# Patient Record
Sex: FEMALE | Race: WHITE | NOT HISPANIC OR LATINO | Employment: UNEMPLOYED | URBAN - METROPOLITAN AREA
[De-identification: names, ages, dates, MRNs, and addresses within clinical notes are randomized per-mention and may not be internally consistent; named-entity substitution may affect disease eponyms.]

---

## 2018-07-30 ENCOUNTER — OFFICE VISIT CONVERTED (OUTPATIENT)
Dept: CARDIOLOGY | Facility: CLINIC | Age: 53
End: 2018-07-30
Attending: INTERNAL MEDICINE

## 2018-10-23 ENCOUNTER — OFFICE VISIT CONVERTED (OUTPATIENT)
Dept: CARDIOLOGY | Facility: CLINIC | Age: 53
End: 2018-10-23
Attending: INTERNAL MEDICINE

## 2018-10-23 ENCOUNTER — CONVERSION ENCOUNTER (OUTPATIENT)
Dept: CARDIOLOGY | Facility: CLINIC | Age: 53
End: 2018-10-23

## 2019-01-07 ENCOUNTER — CONVERSION ENCOUNTER (OUTPATIENT)
Dept: CARDIOLOGY | Facility: CLINIC | Age: 54
End: 2019-01-07

## 2019-01-07 ENCOUNTER — OFFICE VISIT CONVERTED (OUTPATIENT)
Dept: CARDIOLOGY | Facility: CLINIC | Age: 54
End: 2019-01-07
Attending: INTERNAL MEDICINE

## 2019-05-07 ENCOUNTER — OFFICE VISIT CONVERTED (OUTPATIENT)
Dept: CARDIOLOGY | Facility: CLINIC | Age: 54
End: 2019-05-07
Attending: INTERNAL MEDICINE

## 2019-08-13 ENCOUNTER — CONVERSION ENCOUNTER (OUTPATIENT)
Dept: CARDIOLOGY | Facility: CLINIC | Age: 54
End: 2019-08-13

## 2019-08-13 ENCOUNTER — OFFICE VISIT CONVERTED (OUTPATIENT)
Dept: CARDIOLOGY | Facility: CLINIC | Age: 54
End: 2019-08-13
Attending: INTERNAL MEDICINE

## 2019-08-23 ENCOUNTER — HOSPITAL ENCOUNTER (OUTPATIENT)
Dept: OTHER | Facility: HOSPITAL | Age: 54
Discharge: HOME | End: 2019-08-23
Attending: INTERNAL MEDICINE

## 2019-08-23 LAB
ALBUMIN SERPL-MCNC: 4.2 G/DL (ref 3.5–5)
ALBUMIN/GLOB SERPL: 1.8 {RATIO} (ref 1.4–2.6)
ALP SERPL-CCNC: 112 U/L (ref 53–141)
ALT SERPL-CCNC: 9 U/L (ref 10–40)
ANION GAP SERPL CALC-SCNC: 15 MMOL/L (ref 8–19)
AST SERPL-CCNC: 13 U/L (ref 15–50)
BILIRUB SERPL-MCNC: 0.34 MG/DL (ref 0.2–1.3)
BUN SERPL-MCNC: 12 MG/DL (ref 5–25)
BUN/CREAT SERPL: 18 {RATIO} (ref 6–20)
CALCIUM SERPL-MCNC: 9 MG/DL (ref 8.7–10.4)
CHLORIDE SERPL-SCNC: 100 MMOL/L (ref 99–111)
CHOLEST SERPL-MCNC: 91 MG/DL (ref 107–200)
CHOLEST/HDLC SERPL: 2.5 {RATIO} (ref 3–6)
CONV CO2: 26 MMOL/L (ref 22–32)
CONV TOTAL PROTEIN: 6.5 G/DL (ref 6.3–8.2)
CREAT UR-MCNC: 0.67 MG/DL (ref 0.5–0.9)
GFR SERPLBLD BASED ON 1.73 SQ M-ARVRAT: >60 ML/MIN/{1.73_M2}
GLOBULIN UR ELPH-MCNC: 2.3 G/DL (ref 2–3.5)
GLUCOSE SERPL-MCNC: 86 MG/DL (ref 65–99)
HDLC SERPL-MCNC: 36 MG/DL (ref 40–60)
LDLC SERPL CALC-MCNC: 42 MG/DL (ref 70–100)
OSMOLALITY SERPL CALC.SUM OF ELEC: 283 MOSM/KG (ref 273–304)
POTASSIUM SERPL-SCNC: 3.6 MMOL/L (ref 3.5–5.3)
SODIUM SERPL-SCNC: 137 MMOL/L (ref 135–147)
TRIGL SERPL-MCNC: 66 MG/DL (ref 40–150)
VLDLC SERPL-MCNC: 13 MG/DL (ref 5–37)

## 2019-10-15 ENCOUNTER — OFFICE VISIT (OUTPATIENT)
Dept: FAMILY MEDICINE CLINIC | Facility: CLINIC | Age: 54
End: 2019-10-15

## 2019-10-15 VITALS
TEMPERATURE: 98.5 F | HEIGHT: 64 IN | HEART RATE: 108 BPM | BODY MASS INDEX: 26.46 KG/M2 | DIASTOLIC BLOOD PRESSURE: 82 MMHG | OXYGEN SATURATION: 97 % | WEIGHT: 155 LBS | SYSTOLIC BLOOD PRESSURE: 124 MMHG

## 2019-10-15 DIAGNOSIS — E03.9 HYPOTHYROIDISM, UNSPECIFIED TYPE: Primary | ICD-10-CM

## 2019-10-15 DIAGNOSIS — Z00.00 HEALTHCARE MAINTENANCE: ICD-10-CM

## 2019-10-15 PROCEDURE — 90686 IIV4 VACC NO PRSV 0.5 ML IM: CPT | Performed by: NURSE PRACTITIONER

## 2019-10-15 PROCEDURE — 99213 OFFICE O/P EST LOW 20 MIN: CPT | Performed by: NURSE PRACTITIONER

## 2019-10-15 PROCEDURE — 90471 IMMUNIZATION ADMIN: CPT | Performed by: NURSE PRACTITIONER

## 2019-10-15 RX ORDER — ATORVASTATIN CALCIUM 40 MG/1
TABLET, FILM COATED ORAL
Refills: 1 | COMMUNITY
Start: 2019-10-05 | End: 2021-11-08

## 2019-10-15 RX ORDER — CLONAZEPAM 0.5 MG/1
TABLET ORAL
Refills: 2 | COMMUNITY
Start: 2019-10-11 | End: 2022-02-17 | Stop reason: DRUGHIGH

## 2019-10-15 RX ORDER — DEXTROAMPHETAMINE SACCHARATE, AMPHETAMINE ASPARTATE, DEXTROAMPHETAMINE SULFATE AND AMPHETAMINE SULFATE 5; 5; 5; 5 MG/1; MG/1; MG/1; MG/1
1 TABLET ORAL DAILY
Refills: 0 | COMMUNITY
Start: 2022-01-22 | End: 2022-01-28

## 2019-10-15 RX ORDER — VENLAFAXINE HYDROCHLORIDE 150 MG/1
CAPSULE, EXTENDED RELEASE ORAL
Refills: 1 | COMMUNITY
Start: 2019-09-23 | End: 2021-01-12

## 2019-10-15 RX ORDER — TICAGRELOR 60 MG/1
60 TABLET ORAL 2 TIMES DAILY
Refills: 4 | COMMUNITY
Start: 2019-10-05 | End: 2021-07-22

## 2019-10-15 RX ORDER — CITALOPRAM 40 MG/1
20 TABLET ORAL NIGHTLY
Refills: 1 | COMMUNITY
Start: 2019-10-03 | End: 2022-01-18

## 2019-10-15 NOTE — PROGRESS NOTES
Subjective   Chely Peoples is a 54 y.o. female.     Chief Complaint   Patient presents with   • Hypothyroidism       History of Present Illness   Hypothyroidism: The patient reports doing poorly. Symptoms: depression. The patient is adherent to their medication regimen. The patient is not exercising. The patient does use tobacco. Due for: TSH.      The following portions of the patient's history were reviewed and updated as appropriate: allergies, current medications, past family history, past medical history, past social history, past surgical history and problem list.    Past Medical History:   Diagnosis Date   • Anxiety    • Anxiety and depression    • BMI 29.0-29.9,adult    • CAD (coronary artery disease)    • COPD (chronic obstructive pulmonary disease) (CMS/[facility])    • Depression    • Depression    • History of anemia    • History of suicide attempt    • History of viral gastroenteritis    • Hypothyroidism        Past Surgical History:   Procedure Laterality Date   • APPENDECTOMY     •  SECTION     • HYSTERECTOMY      Not due to cancer   • OOPHORECTOMY     • TONSILLECTOMY         Family History   Problem Relation Age of Onset   • COPD Mother    • Heart disease Other         FH in males before the age of 55       Social History     Socioeconomic History   • Marital status: Legally      Spouse name: Not on file   • Number of children: Not on file   • Years of education: Not on file   • Highest education level: Not on file   Tobacco Use   • Smoking status: Current Every Day Smoker     Packs/day: 1.00     Types: Cigarettes, Electronic Cigarette   • Smokeless tobacco: Never Used   • Tobacco comment: smokes less than 1 pack per day for 35 years   Substance and Sexual Activity   • Alcohol use: Yes     Comment: 7 or less drinks per week   • Drug use: No       Review of Systems   Psychiatric/Behavioral: Positive for depressed mood. Negative for suicidal ideas.       Objective   Vitals:    10/15/19  "1010   BP: 124/82   BP Location: Left arm   Patient Position: Sitting   Pulse: 108   Temp: 98.5 °F (36.9 °C)   SpO2: 97%   Weight: 70.3 kg (155 lb)   Height: 162.6 cm (64\")      Body mass index is 26.61 kg/m².  Physical Exam   Constitutional: She appears well-developed and well-nourished.   Cardiovascular: Normal rate, regular rhythm and normal heart sounds.   Pulmonary/Chest: Effort normal and breath sounds normal.   Psychiatric: She has a normal mood and affect.   Nursing note and vitals reviewed.        Assessment/Plan   Chely was seen today for hypothyroidism.    Diagnoses and all orders for this visit:    Hypothyroidism, unspecified type  -     TSH  -     Vitamin D 25 Hydroxy    Healthcare maintenance  -     Fluarix/Fluzone/Afluria Quad/FluLaval Quad               "

## 2019-10-16 ENCOUNTER — TELEPHONE (OUTPATIENT)
Dept: FAMILY MEDICINE CLINIC | Facility: CLINIC | Age: 54
End: 2019-10-16

## 2019-10-16 LAB
25(OH)D3+25(OH)D2 SERPL-MCNC: 20.5 NG/ML (ref 30–100)
TSH SERPL DL<=0.005 MIU/L-ACNC: 2.32 UIU/ML (ref 0.27–4.2)

## 2019-10-16 RX ORDER — ERGOCALCIFEROL 1.25 MG/1
50000 CAPSULE ORAL WEEKLY
Qty: 12 CAPSULE | Refills: 1 | Status: SHIPPED | OUTPATIENT
Start: 2019-10-16 | End: 2020-04-02 | Stop reason: SDUPTHER

## 2019-10-16 RX ORDER — LEVOTHYROXINE SODIUM 0.1 MG/1
100 TABLET ORAL DAILY
COMMUNITY
End: 2020-07-27 | Stop reason: SDUPTHER

## 2019-10-16 NOTE — TELEPHONE ENCOUNTER
Thyroid function test is normal.  Vitamin D is deficient at 20.5 so would recommend starting vitamin D2 50,000 units weekly and repeat vitamin D level in 6 months.

## 2020-01-14 ENCOUNTER — OFFICE VISIT (OUTPATIENT)
Dept: FAMILY MEDICINE CLINIC | Facility: CLINIC | Age: 55
End: 2020-01-14

## 2020-01-14 VITALS
HEART RATE: 104 BPM | SYSTOLIC BLOOD PRESSURE: 110 MMHG | HEIGHT: 64 IN | TEMPERATURE: 98.7 F | WEIGHT: 150 LBS | DIASTOLIC BLOOD PRESSURE: 62 MMHG | OXYGEN SATURATION: 98 % | BODY MASS INDEX: 25.61 KG/M2

## 2020-01-14 DIAGNOSIS — H93.13 TINNITUS OF BOTH EARS: Primary | ICD-10-CM

## 2020-01-14 PROBLEM — F90.9 ADHD: Status: ACTIVE | Noted: 2017-07-17

## 2020-01-14 PROBLEM — F32.A ANXIETY AND DEPRESSION: Status: ACTIVE | Noted: 2017-07-17

## 2020-01-14 PROBLEM — F41.9 ANXIETY AND DEPRESSION: Status: ACTIVE | Noted: 2017-07-17

## 2020-01-14 PROCEDURE — 99213 OFFICE O/P EST LOW 20 MIN: CPT | Performed by: NURSE PRACTITIONER

## 2020-01-14 NOTE — PROGRESS NOTES
Subjective   Chely Peoples is a 54 y.o. female.     Chief Complaint   Patient presents with   • Tinnitus       Tinnitus   This is a chronic problem. The problem has been unchanged. Pertinent negatives include no congestion or fever. Nothing aggravates the symptoms. Treatments tried: OTC tinnitus treatment  The treatment provided no relief.          The following portions of the patient's history were reviewed and updated as appropriate: allergies, current medications, past family history, past medical history, past social history, past surgical history and problem list.    Past Medical History:   Diagnosis Date   • Anxiety    • Anxiety and depression    • BMI 29.0-29.9,adult    • CAD (coronary artery disease)    • COPD (chronic obstructive pulmonary disease) (CMS/[facility])    • Depression    • Depression    • History of anemia    • History of suicide attempt    • History of viral gastroenteritis    • Hypothyroidism        Past Surgical History:   Procedure Laterality Date   • APPENDECTOMY     •  SECTION     • HYSTERECTOMY      Not due to cancer   • OOPHORECTOMY     • TONSILLECTOMY         Family History   Problem Relation Age of Onset   • COPD Mother    • Heart disease Other         FH in males before the age of 55       Social History     Socioeconomic History   • Marital status: Legally      Spouse name: Not on file   • Number of children: Not on file   • Years of education: Not on file   • Highest education level: Not on file   Tobacco Use   • Smoking status: Current Every Day Smoker     Packs/day: 1.00     Types: Cigarettes, Electronic Cigarette   • Smokeless tobacco: Never Used   • Tobacco comment: smokes less than 1 pack per day for 35 years   Substance and Sexual Activity   • Alcohol use: Yes     Comment: 7 or less drinks per week   • Drug use: No       Review of Systems   Constitutional: Negative for fever.   HENT: Positive for tinnitus. Negative for congestion and ear pain.        Objective  "  Vitals:    01/14/20 1714   BP: 110/62   BP Location: Left arm   Patient Position: Sitting   Pulse: 104   Temp: 98.7 °F (37.1 °C)   SpO2: 98%   Weight: 68 kg (150 lb)   Height: 162.6 cm (64.02\")      Body mass index is 25.73 kg/m².  Physical Exam   Constitutional: She is oriented to person, place, and time. She appears well-developed and well-nourished.   HENT:   Head: Normocephalic and atraumatic.   Right Ear: Tympanic membrane and ear canal normal.   Left Ear: Tympanic membrane and ear canal normal.   Nose: Nose normal.   Mouth/Throat: Oropharynx is clear and moist.   Cardiovascular: Normal rate, regular rhythm and normal heart sounds.   Pulmonary/Chest: Effort normal and breath sounds normal.   Neurological: She is alert and oriented to person, place, and time.   Skin: Skin is warm and dry.   Psychiatric: She has a normal mood and affect.   Nursing note and vitals reviewed.        Assessment/Plan   Chely was seen today for tinnitus.    Diagnoses and all orders for this visit:    Tinnitus of both ears  -     Ambulatory Referral to ENT (Otolaryngology)               "

## 2020-02-17 ENCOUNTER — CONVERSION ENCOUNTER (OUTPATIENT)
Dept: CARDIOLOGY | Facility: CLINIC | Age: 55
End: 2020-02-17

## 2020-02-17 ENCOUNTER — OFFICE VISIT CONVERTED (OUTPATIENT)
Dept: CARDIOLOGY | Facility: CLINIC | Age: 55
End: 2020-02-17
Attending: INTERNAL MEDICINE

## 2020-04-02 RX ORDER — ERGOCALCIFEROL 1.25 MG/1
50000 CAPSULE ORAL WEEKLY
Qty: 12 CAPSULE | Refills: 0 | Status: SHIPPED | OUTPATIENT
Start: 2020-04-02 | End: 2020-07-07 | Stop reason: SDUPTHER

## 2020-06-01 ENCOUNTER — HOSPITAL ENCOUNTER (OUTPATIENT)
Dept: OTHER | Facility: HOSPITAL | Age: 55
Discharge: HOME | End: 2020-06-01
Attending: INTERNAL MEDICINE

## 2020-06-01 LAB
ALBUMIN SERPL-MCNC: 4.1 G/DL (ref 3.5–5)
ALBUMIN/GLOB SERPL: 1.9 {RATIO} (ref 1.4–2.6)
ALP SERPL-CCNC: 107 U/L (ref 53–141)
ALT SERPL-CCNC: 8 U/L (ref 10–40)
ANION GAP SERPL CALC-SCNC: 13 MMOL/L (ref 8–19)
AST SERPL-CCNC: 13 U/L (ref 15–50)
BILIRUB SERPL-MCNC: 0.31 MG/DL (ref 0.2–1.3)
BUN SERPL-MCNC: 8 MG/DL (ref 5–25)
BUN/CREAT SERPL: 12 {RATIO} (ref 6–20)
CALCIUM SERPL-MCNC: 9.4 MG/DL (ref 8.7–10.4)
CHLORIDE SERPL-SCNC: 104 MMOL/L (ref 99–111)
CHOLEST SERPL-MCNC: 97 MG/DL (ref 107–200)
CHOLEST/HDLC SERPL: 2.2 {RATIO} (ref 3–6)
CONV CO2: 25 MMOL/L (ref 22–32)
CONV TOTAL PROTEIN: 6.3 G/DL (ref 6.3–8.2)
CREAT UR-MCNC: 0.68 MG/DL (ref 0.5–0.9)
GFR SERPLBLD BASED ON 1.73 SQ M-ARVRAT: >60 ML/MIN/{1.73_M2}
GLOBULIN UR ELPH-MCNC: 2.2 G/DL (ref 2–3.5)
GLUCOSE SERPL-MCNC: 96 MG/DL (ref 65–99)
HDLC SERPL-MCNC: 44 MG/DL (ref 40–60)
LDLC SERPL CALC-MCNC: 40 MG/DL (ref 70–100)
OSMOLALITY SERPL CALC.SUM OF ELEC: 284 MOSM/KG (ref 273–304)
POTASSIUM SERPL-SCNC: 3.6 MMOL/L (ref 3.5–5.3)
SODIUM SERPL-SCNC: 138 MMOL/L (ref 135–147)
TRIGL SERPL-MCNC: 63 MG/DL (ref 40–150)
VLDLC SERPL-MCNC: 13 MG/DL (ref 5–37)

## 2020-06-03 ENCOUNTER — OFFICE VISIT CONVERTED (OUTPATIENT)
Dept: CARDIOLOGY | Facility: CLINIC | Age: 55
End: 2020-06-03
Attending: INTERNAL MEDICINE

## 2020-06-03 ENCOUNTER — CONVERSION ENCOUNTER (OUTPATIENT)
Dept: OTHER | Facility: HOSPITAL | Age: 55
End: 2020-06-03

## 2020-07-08 RX ORDER — ERGOCALCIFEROL 1.25 MG/1
50000 CAPSULE ORAL WEEKLY
Qty: 12 CAPSULE | Refills: 0 | Status: SHIPPED | OUTPATIENT
Start: 2020-07-08 | End: 2021-07-22

## 2020-07-28 RX ORDER — LEVOTHYROXINE SODIUM 0.1 MG/1
100 TABLET ORAL DAILY
Qty: 90 TABLET | Refills: 0 | Status: SHIPPED | OUTPATIENT
Start: 2020-07-28 | End: 2020-08-07

## 2020-08-06 ENCOUNTER — OFFICE VISIT (OUTPATIENT)
Dept: FAMILY MEDICINE CLINIC | Facility: CLINIC | Age: 55
End: 2020-08-06

## 2020-08-06 VITALS
HEART RATE: 85 BPM | WEIGHT: 146.4 LBS | DIASTOLIC BLOOD PRESSURE: 64 MMHG | BODY MASS INDEX: 25.94 KG/M2 | SYSTOLIC BLOOD PRESSURE: 112 MMHG | OXYGEN SATURATION: 98 % | TEMPERATURE: 99.3 F | HEIGHT: 63 IN

## 2020-08-06 DIAGNOSIS — E55.9 VITAMIN D DEFICIENCY: ICD-10-CM

## 2020-08-06 DIAGNOSIS — E03.9 HYPOTHYROIDISM, UNSPECIFIED TYPE: ICD-10-CM

## 2020-08-06 DIAGNOSIS — Z00.00 WELL FEMALE EXAM WITHOUT GYNECOLOGICAL EXAM: Primary | ICD-10-CM

## 2020-08-06 PROBLEM — E78.5 HYPERLIPIDEMIA: Status: ACTIVE | Noted: 2020-08-06

## 2020-08-06 PROBLEM — I25.10 CAD (CORONARY ARTERY DISEASE): Status: ACTIVE | Noted: 2020-08-06

## 2020-08-06 LAB
BILIRUB BLD-MCNC: NEGATIVE MG/DL
CLARITY, POC: CLEAR
COLOR UR: YELLOW
GLUCOSE UR STRIP-MCNC: NEGATIVE MG/DL
KETONES UR QL: NEGATIVE
LEUKOCYTE EST, POC: NEGATIVE
NITRITE UR-MCNC: NEGATIVE MG/ML
PH UR: 6 [PH] (ref 5–8)
PROT UR STRIP-MCNC: NEGATIVE MG/DL
RBC # UR STRIP: NEGATIVE /UL
SP GR UR: 1.01 (ref 1–1.03)
UROBILINOGEN UR QL: NORMAL

## 2020-08-06 PROCEDURE — 99396 PREV VISIT EST AGE 40-64: CPT | Performed by: NURSE PRACTITIONER

## 2020-08-06 PROCEDURE — 90715 TDAP VACCINE 7 YRS/> IM: CPT | Performed by: NURSE PRACTITIONER

## 2020-08-06 PROCEDURE — 90471 IMMUNIZATION ADMIN: CPT | Performed by: NURSE PRACTITIONER

## 2020-08-06 PROCEDURE — 81003 URINALYSIS AUTO W/O SCOPE: CPT | Performed by: NURSE PRACTITIONER

## 2020-08-06 PROCEDURE — 90732 PPSV23 VACC 2 YRS+ SUBQ/IM: CPT | Performed by: NURSE PRACTITIONER

## 2020-08-06 PROCEDURE — 90472 IMMUNIZATION ADMIN EACH ADD: CPT | Performed by: NURSE PRACTITIONER

## 2020-08-06 NOTE — PROGRESS NOTES
Subjective   Chely Peoples is a 55 y.o. female.     Chief Complaint   Patient presents with   • Annual Exam       History of Present Illness   The patient is being seen for a health maintenance evaluation.  The last health maintenance visit is unknown.  Social history: Household members include none.  She is .  Work status: Not currently employed.  The patient is a current electronic cigarette smoker.  She reports rare alcohol use.  General health: The patient's health since the last visit is described as good.  She does not have regular dental visits.  The patient brushes 2 times a day, flosses daily and reports her last dental visit is unknown.  She denies vision problems.  Vision care includes reading glasses and no recent eye examination.  She denies hearing loss.  Immunization status: Tdap and pneumococcal vaccinations needed.  Lifestyle: She exercises regularly.  Reproductive health: She is not sexually active.  Screening: Last mammogram was about 3 years ago.  Last colonoscopy was performed at [facility] but date is unknown.      The following portions of the patient's history were reviewed and updated as appropriate: allergies, current medications, past family history, past medical history, past social history, past surgical history and problem list.    Past Medical History:   Diagnosis Date   • Anxiety    • Anxiety and depression    • BMI 29.0-29.9,adult    • CAD (coronary artery disease)    • COPD (chronic obstructive pulmonary disease) (CMS/[facility])    • Depression    • Depression    • History of anemia    • History of suicide attempt    • History of viral gastroenteritis    • Hypothyroidism        Past Surgical History:   Procedure Laterality Date   • APPENDECTOMY     •  SECTION     • HYSTERECTOMY      Not due to cancer   • OOPHORECTOMY     • TONSILLECTOMY         Family History   Problem Relation Age of Onset   • COPD Mother    • Heart disease Other         FH in males before the age of 55  "      Social History     Socioeconomic History   • Marital status: Legally      Spouse name: Not on file   • Number of children: Not on file   • Years of education: Not on file   • Highest education level: Not on file   Tobacco Use   • Smoking status: Current Every Day Smoker     Packs/day: 1.00     Types: Cigarettes, Electronic Cigarette   • Smokeless tobacco: Never Used   • Tobacco comment: smokes less than 1 pack per day for 35 years   Substance and Sexual Activity   • Alcohol use: Yes     Comment: 7 or less drinks per week   • Drug use: No       Review of Systems   Constitutional: Negative for fever.   HENT: Negative for ear pain, rhinorrhea and sore throat.    Eyes: Negative for visual disturbance.   Respiratory: Negative for cough and shortness of breath.    Cardiovascular: Negative for chest pain and leg swelling.   Gastrointestinal: Negative for abdominal pain, diarrhea, nausea and vomiting.   Genitourinary: Negative.    Musculoskeletal: Negative.    Skin: Negative for rash.   Neurological: Negative for dizziness and headache.   Psychiatric/Behavioral: Negative for depressed mood.       Objective   Vitals:    08/06/20 1011   BP: 112/64   Pulse: 85   Temp: 99.3 °F (37.4 °C)   TempSrc: Temporal   SpO2: 98%   Weight: 66.4 kg (146 lb 6.4 oz)   Height: 160 cm (63\")      Body mass index is 25.93 kg/m².  Physical Exam   Constitutional: She is oriented to person, place, and time. She appears well-developed and well-nourished.   HENT:   Head: Normocephalic and atraumatic.   Right Ear: Tympanic membrane and ear canal normal.   Left Ear: Tympanic membrane and ear canal normal.   Eyes: Pupils are equal, round, and reactive to light. Conjunctivae are normal.   Neck: Neck supple.   Cardiovascular: Normal rate, regular rhythm and normal heart sounds.   Pulmonary/Chest: Effort normal and breath sounds normal.   Abdominal: Soft. Bowel sounds are normal.   Genitourinary:   Genitourinary Comments: Deferred  "   Musculoskeletal: Normal range of motion.   Neurological: She is alert and oriented to person, place, and time.   Skin: Skin is warm and dry.   Psychiatric: She has a normal mood and affect.   Nursing note and vitals reviewed.        Assessment/Plan   Chely was seen today for annual exam.    Diagnoses and all orders for this visit:    Well female exam without gynecological exam  -     POCT urinalysis dipstick, automated  -     Mammo Screening Bilateral With CAD; Future  -     Tdap Vaccine Greater Than or Equal To 8yo IM  -     Pneumococcal Polysaccharide Vaccine 23-Valent Greater Than or Equal To 1yo Subcutaneous / IM  -     CBC & Differential    Hypothyroidism, unspecified type  -     TSH    Vitamin D deficiency  -     Vitamin D 25 Hydroxy    Impression: Currently, she has an adequate exercise regimen.  Mammogram was ordered today. Will obtain last colonoscopy record. Screening lab work includes hemoglobin, thyroid and vitamin D.  Tdap and pneumococcal vaccinations given today.  She was advised to be evaluated by dentist.  Advice and education were given regarding smoking cessation.

## 2020-08-07 ENCOUNTER — TELEPHONE (OUTPATIENT)
Dept: FAMILY MEDICINE CLINIC | Facility: CLINIC | Age: 55
End: 2020-08-07

## 2020-08-07 DIAGNOSIS — E03.9 HYPOTHYROIDISM, UNSPECIFIED TYPE: Primary | ICD-10-CM

## 2020-08-07 LAB
25(OH)D3+25(OH)D2 SERPL-MCNC: 55.1 NG/ML (ref 30–100)
BASOPHILS # BLD AUTO: 0 10*3/MM3 (ref 0–0.2)
BASOPHILS NFR BLD AUTO: 0 % (ref 0–1.5)
EOSINOPHIL # BLD AUTO: 0 10*3/MM3 (ref 0–0.4)
EOSINOPHIL NFR BLD AUTO: 0 % (ref 0.3–6.2)
ERYTHROCYTE [DISTWIDTH] IN BLOOD BY AUTOMATED COUNT: 12.3 % (ref 12.3–15.4)
HCT VFR BLD AUTO: 41.4 % (ref 34–46.6)
HGB BLD-MCNC: 13.9 G/DL (ref 12–15.9)
IMM GRANULOCYTES # BLD AUTO: 0.01 10*3/MM3 (ref 0–0.05)
IMM GRANULOCYTES NFR BLD AUTO: 0.2 % (ref 0–0.5)
LYMPHOCYTES # BLD AUTO: 1.34 10*3/MM3 (ref 0.7–3.1)
LYMPHOCYTES NFR BLD AUTO: 29.8 % (ref 19.6–45.3)
MCH RBC QN AUTO: 30.9 PG (ref 26.6–33)
MCHC RBC AUTO-ENTMCNC: 33.6 G/DL (ref 31.5–35.7)
MCV RBC AUTO: 92 FL (ref 79–97)
MONOCYTES # BLD AUTO: 0.25 10*3/MM3 (ref 0.1–0.9)
MONOCYTES NFR BLD AUTO: 5.6 % (ref 5–12)
NEUTROPHILS # BLD AUTO: 2.9 10*3/MM3 (ref 1.7–7)
NEUTROPHILS NFR BLD AUTO: 64.4 % (ref 42.7–76)
NRBC BLD AUTO-RTO: 0 /100 WBC (ref 0–0.2)
PLATELET # BLD AUTO: 217 10*3/MM3 (ref 140–450)
RBC # BLD AUTO: 4.5 10*6/MM3 (ref 3.77–5.28)
TSH SERPL DL<=0.005 MIU/L-ACNC: 5.55 UIU/ML (ref 0.27–4.2)
WBC # BLD AUTO: 4.5 10*3/MM3 (ref 3.4–10.8)

## 2020-08-07 RX ORDER — LEVOTHYROXINE SODIUM 100 MCG
100 TABLET ORAL DAILY
Qty: 90 TABLET | Refills: 0 | Status: SHIPPED | OUTPATIENT
Start: 2020-08-07 | End: 2021-01-13

## 2020-08-07 NOTE — TELEPHONE ENCOUNTER
CBC is within acceptable limits. TSH is elevated so will need to increase levothyroxine unless you have missed any doses. Vitamin D is optimal at 55.1 so will just need to switch to OTC vitamin D3 supplementation.    Patient has missed several doses of Synthroid so will not adjust at this time. Will repeat TSH in 8 weeks.

## 2020-10-02 ENCOUNTER — RESULTS ENCOUNTER (OUTPATIENT)
Dept: FAMILY MEDICINE CLINIC | Facility: CLINIC | Age: 55
End: 2020-10-02

## 2020-10-02 DIAGNOSIS — E03.9 HYPOTHYROIDISM, UNSPECIFIED TYPE: ICD-10-CM

## 2021-01-04 RX ORDER — LEVOTHYROXINE SODIUM 100 MCG
100 TABLET ORAL DAILY
Qty: 90 TABLET | Refills: 0 | Status: CANCELLED | OUTPATIENT
Start: 2021-01-04

## 2021-01-04 NOTE — TELEPHONE ENCOUNTER
Caller: Chely Peoples    Relationship: Self    Best call back number: [phone]    Medication needed:   Requested Prescriptions     Pending Prescriptions Disp Refills   • Synthroid 100 MCG tablet 90 tablet 0     Sig: Take 1 tablet by mouth Daily.       When do you need the refill by: ASAP    What details did the patient provide when requesting the medication: PATIENT IS COMPLETELY OUT OF MEDICATION. SHE SAID SHE DOESN'T HAVE ANY REFILLS LEFT AND MADE APPT WITH QUENTIN HORN FOR 1/12/20.     Does the patient have less than a 3 day supply:  [x] Yes  [] No    What is the patient's preferred pharmacy:   [facility] [address] [place] [address], [place] [address]  MARGARITA DOMINGUEZ  - [phone]  - [phone] FX

## 2021-01-12 ENCOUNTER — OFFICE VISIT (OUTPATIENT)
Dept: FAMILY MEDICINE CLINIC | Facility: CLINIC | Age: 56
End: 2021-01-12

## 2021-01-12 VITALS
OXYGEN SATURATION: 98 % | WEIGHT: 140.4 LBS | TEMPERATURE: 97.3 F | SYSTOLIC BLOOD PRESSURE: 110 MMHG | HEART RATE: 97 BPM | BODY MASS INDEX: 24.88 KG/M2 | DIASTOLIC BLOOD PRESSURE: 68 MMHG | HEIGHT: 63 IN

## 2021-01-12 DIAGNOSIS — E03.9 HYPOTHYROIDISM, UNSPECIFIED TYPE: Primary | ICD-10-CM

## 2021-01-12 PROBLEM — T14.91XA SUICIDE ATTEMPT: Status: ACTIVE | Noted: 2020-09-13

## 2021-01-12 PROBLEM — I95.9 HYPOTENSION: Status: ACTIVE | Noted: 2020-09-13

## 2021-01-12 PROCEDURE — 99212 OFFICE O/P EST SF 10 MIN: CPT | Performed by: NURSE PRACTITIONER

## 2021-01-12 RX ORDER — DULOXETIN HYDROCHLORIDE 30 MG/1
30 CAPSULE, DELAYED RELEASE ORAL DAILY
COMMUNITY
End: 2021-07-22

## 2021-01-12 NOTE — PROGRESS NOTES
"Chief Complaint  Hypothyroidism    Subjective          Chely Peoples presents to [facility] for   History of Present Illness  Hypothyroidism: The patient reports doing fairly well. Symptoms: depression. The patient is adherent to their medication regimen. The patient is not exercising. The patient does use tobacco. Due for: TSH.    Objective   Vital Signs:   /68   Pulse 97   Temp 97.3 °F (36.3 °C) (Temporal)   Ht 160 cm (63\")   Wt 63.7 kg (140 lb 6.4 oz)   SpO2 98%   BMI 24.87 kg/m²     Physical Exam  Vitals signs and nursing note reviewed.   Cardiovascular:      Rate and Rhythm: Normal rate and regular rhythm.   Pulmonary:      Effort: Pulmonary effort is normal.      Breath sounds: Normal breath sounds.   Neurological:      Mental Status: She is oriented to person, place, and time.   Psychiatric:         Mood and Affect: Mood normal.        Result Review :   The following data was reviewed by: LOLITA Kimbrough on 01/12/2021:  TSH    TSH 8/6/20   TSH 5.550 (A)   (A) Abnormal value                   Assessment and Plan    Problem List Items Addressed This Visit        Endocrine and Metabolic    Hypothyroidism - Primary    Relevant Orders    TSH        I spent 10 minutes caring for Chely on this date of service. This time includes time spent by me in the following activities:reviewing tests, performing a medically appropriate examination and/or evaluation , counseling and educating the patient/family/caregiver, ordering medications, tests, or procedures and documenting information in the medical record  Follow Up   No follow-ups on file.  Patient was given instructions and counseling regarding her condition or for health maintenance advice. Please see specific information pulled into the AVS if appropriate.       "

## 2021-01-13 ENCOUNTER — TELEPHONE (OUTPATIENT)
Dept: FAMILY MEDICINE CLINIC | Facility: CLINIC | Age: 56
End: 2021-01-13

## 2021-01-13 DIAGNOSIS — E03.9 HYPOTHYROIDISM, UNSPECIFIED TYPE: Primary | ICD-10-CM

## 2021-01-13 LAB — TSH SERPL DL<=0.005 MIU/L-ACNC: 13.4 UIU/ML (ref 0.27–4.2)

## 2021-01-13 RX ORDER — LEVOTHYROXINE SODIUM 125 MCG
125 TABLET ORAL DAILY
Qty: 90 TABLET | Refills: 0 | Status: SHIPPED | OUTPATIENT
Start: 2021-01-13 | End: 2021-07-06 | Stop reason: SDUPTHER

## 2021-01-13 NOTE — TELEPHONE ENCOUNTER
TSH has increased since last visit so will need to increase Synthroid to 125 mcg daily and repeat TSH in 8 weeks. Please make sure you are compliant with medication regimen.    Patient aware of results and recommendations.

## 2021-01-14 ENCOUNTER — TELEPHONE (OUTPATIENT)
Dept: FAMILY MEDICINE CLINIC | Facility: CLINIC | Age: 56
End: 2021-01-14

## 2021-01-14 NOTE — TELEPHONE ENCOUNTER
Caller: Chely Peoples    Relationship to patient: Self    Best call back number: [phone]    Patient is needing: PT CALLED WANTING TO KNOW WHAT HER BLOOD TYPE IS IF THE OFFICE WOULD HAVE THAT RECORD

## 2021-01-14 NOTE — TELEPHONE ENCOUNTER
Patient informed we do not type blood and advised her to call the hospital she had surgery at to see if they typed her blood for the surgery or she can donate blood and have it typed that way.

## 2021-01-19 ENCOUNTER — OFFICE VISIT CONVERTED (OUTPATIENT)
Dept: CARDIOLOGY | Facility: CLINIC | Age: 56
End: 2021-01-19
Attending: INTERNAL MEDICINE

## 2021-03-02 ENCOUNTER — TELEPHONE (OUTPATIENT)
Dept: FAMILY MEDICINE CLINIC | Facility: CLINIC | Age: 56
End: 2021-03-02

## 2021-03-02 NOTE — TELEPHONE ENCOUNTER
lmtcb    Okay for hub to read    Need to see if patient has had mammogram or the date and time that it is scheduled.

## 2021-04-29 ENCOUNTER — TELEPHONE (OUTPATIENT)
Dept: FAMILY MEDICINE CLINIC | Facility: CLINIC | Age: 56
End: 2021-04-29

## 2021-04-29 NOTE — TELEPHONE ENCOUNTER
** hub may ask patient and route response message back to provider**    Ask patient if she ever went to have her mammogram from 2020 and if she did where and what date she go.

## 2021-04-29 NOTE — TELEPHONE ENCOUNTER
PATIENT CALLED AND HUB ASKED AND SHE STATED SHE DID NOT HAVE A MAMMOGRAM DONE IN 2020 BUT WOULD LIKE TO GO AHEAD AND HAVE ORDERS SENT TO GET ONE DONE PLEASE.  CALLER:MANASA   CALLBACK # [phone]

## 2021-04-29 NOTE — TELEPHONE ENCOUNTER
There is a mammogram order dated 08/06/20 can patient still use that order ? She never went to have it done. This patient is on your metric sheet.

## 2021-05-07 ENCOUNTER — TELEPHONE (OUTPATIENT)
Dept: FAMILY MEDICINE CLINIC | Facility: CLINIC | Age: 56
End: 2021-05-07

## 2021-05-07 NOTE — TELEPHONE ENCOUNTER
Left message to return call to see if pt had her mammo done at [facility] yet, no record found.

## 2021-05-13 NOTE — PROGRESS NOTES
Progress Note      Patient Name: Chely Peoples   Patient ID: [id number]   Sex: Female   YOB: 1965    Primary Care Provider: Narcisa MCHUGH   Referring Provider: Narcisa MCHUGH    Visit Date: Kay 3, 2020    Provider: Jose Luis Owens MD   Location: [facility]   Location Address: [address] [place] [address] [facility]  [address] [place] [address]  [id number]   Location Phone: [phone]          Chief Complaint  · Follow-up visit for coronary artery disease       History Of Present Illness  REFERRING CARE PROVIDER: Narcisa MCHUGH   Chely Peoples is a 54-year-old female with premature coronary artery disease, previous myocardial infarction and stent placement, who is here for a follow-up visit. Currently she reports feeling fine. No chest pain episodes in the past 6 months. Occasionally she feels palpitations she describes as a fluttering sensation in the chest, which she feels is related to anxiety. There is no shortness of breath or dizziness. She is taking all the medicines as prescribed.   PAST MEDICAL HISTORY: (1) Coronary artery disease, index presentation being non-STEMI on 06/29/2018 followed by angioplasty and stent placement in the right coronary artery. She was also noted to have a 60% stenosis involving the mid left anterior descending artery with calcification. (2) Hyperlipidemia. (3) Hypothyroidism. (4) Negative for diabetes mellitus.   PSYCHOSOCIAL HISTORY: Positive for mood changes and depression. She never used alcohol. She previously used tobacco but quit.   CURRENT MEDICATIONS: include Atorvastatin 40 mg daily; Brilinta 60 mg b.i.d; aspirin 81 mg daily; Synthroid 0.1 mg daily; Citalopram 40 mg daily; Venlafaxine 150 mg daily; Clonazepam 0.5 mg daily; Vitamin D2; Adderall 20 mg b.i.d.; B12. The dosage and frequency of the medications were reviewed with the patient.       Review of Systems  · Cardiovascular  o Admits  o : palpitations (fast, fluttering, or  "skipping beats)  o Denies  o : swelling (feet, ankles, hands), shortness of breath while walking or lying flat, chest pain or angina pectoris   · Respiratory  o Denies  o : chronic or frequent cough, asthma or wheezing      Vitals  Date Time BP Position Site L\R Cuff Size HR RR TEMP (F) WT  HT  BMI kg/m2 BSA m2 O2 Sat HC       06/03/2020 02:01 /75 Sitting    81 - R   145lbs 8oz 5'  3\" 25.77 1.71           Physical Examination  · Respiratory  o Auscultation of Lungs  o : Clear to auscultation bilaterally. No crackles or rhonchi.  · Cardiovascular  o Heart  o : S1, S2 is normally heard. No S3. No murmur, rubs, or gallops.  · Gastrointestinal  o Abdominal Examination  o : Soft, nontender, nondistended. No free fluid. Bowel sounds heard in all four quadrants.  · Extremities  o Extremities  o : Warm and well perfused. No pitting pedal edema. Distal pulses present.     Labs done on 06/01/2020 show BUN of 8, creatinine 0.68, sodium 138, potassium 3.6.  Chloride 104, bicarb 25, total protein 6.3, albumin 4.1, globulin 2.2.  AST 13, ALT 8.  TRG 63, TC 97, HDL 44, LDL 40.  Bilirubin is 0.31.           Assessment     ASSESSMENT AND PLAN:    1.  Coronary artery disease:  Previous angioplasty, stable with no angina.  Preserved left ventricular function.       Continue aspirin, Brilinta, statin.  2.  Hyperlipidemia:  LDL at goal.  Continue Atorvastatin.  3.  Follow up in 6 months with repeat labs.    MD JORDAN Lance/erasmo           This note was transcribed by Janis Lake.  erasmo/JORDAN  The above service was transcribed by Janis Lake, and I attest to the accuracy of the note.  JORDAN               Electronically Signed by: Janis Lake-, -Author on Kay 10, 2020 09:22:26 AM  Electronically Co-signed by: Jose Luis Owens MD -Reviewer on June 18, 2020 03:24:09 PM  "

## 2021-05-14 VITALS
WEIGHT: 144 LBS | DIASTOLIC BLOOD PRESSURE: 70 MMHG | SYSTOLIC BLOOD PRESSURE: 116 MMHG | HEART RATE: 70 BPM | HEIGHT: 63 IN | BODY MASS INDEX: 25.52 KG/M2

## 2021-05-14 NOTE — PROGRESS NOTES
Progress Note      Patient Name: Chely Peoples   Patient ID: [id number]   Sex: Female   YOB: 1965    Primary Care Provider: Narcisa MCHUGH   Referring Provider: Narcisa MCHUGH    Visit Date: January 19, 2021    Provider: Jose Luis Owens MD   Location: [facility]   Location Address: [address] [place] [address] [facility]  [address]  [place], [place]  [id number]   Location Phone: [phone]          Chief Complaint  · Coronary artery disease       History Of Present Illness  REFERRING CARE PROVIDER: Narcisa MCHUGH   Chely ePoples is a 55-year-old female with premature coronary artery disease, previous myocardial infarction and stent placement who is here for a followup visit. She was last seen in the office on 06/03/2020. She had an admission to [facility] in September because of intentional drug overdose. Per discharge summary, she was noted to have prolonged QT interval during the stay but no medication changes were made. She was discharged to inpatient psychiatry. The patient is currently going through a depression spell and is being considered for low-dose ketamine infusion for treatment. Currently, she does not have any chest pain, tightness, or pressure on activities. She does get chest tightness when she is stressed out and nervous, associated with palpitations. Not very active at baseline. Denies having any shortness of breath. No dizziness or syncopal episodes.   PAST MEDICAL HISTORY: (1) Coronary artery disease, index presentation being non-STEMI on 06/29/2018 followed by angioplasty and stent placement in the right coronary artery. She was also noted to have a 60% stenosis involving the mid left anterior descending artery with calcification. (2) Hyperlipidemia. (3) Hypothyroidism. (4) Negative for diabetes mellitus.   PSYCHOSOCIAL HISTORY: Rare alcohol use. Currently uses E-cigarettes.   CURRENT MEDICATIONS: Brilinta 60 mg b.i.d.; Atorvastatin 40 mg daily;  "Venlafaxine 150 mg daily; Duloxetine 30 mg daily; Synthroid 125 mg daily; Amphetamine salts 20 mg b.i.d.; multivitamin; B12 Complex and B12 sublingual.      ALLERGIES: Tetracycline.       Review of Systems  · Cardiovascular  o Admits  o : palpitations (fast, fluttering, or skipping beats), chest pain or angina pectoris   o Denies  o : swelling (feet, ankles, hands), shortness of breath while walking or lying flat  · Respiratory  o Denies  o : chronic or frequent cough      Vitals  Date Time BP Position Site L\R Cuff Size HR RR TEMP (F) WT  HT  BMI kg/m2 BSA m2 O2 Sat FR L/min FiO2 HC       01/19/2021 11:31 /70 Sitting    70 - R   144lbs 0oz 5'  3\" 25.51 1.7             Physical Examination  · Respiratory  o Auscultation of Lungs  o : Clear to auscultation bilaterally. No crackles or rhonchi.  · Cardiovascular  o Heart  o : S1, S2 is normally heard. No S3. No murmur, rubs, or gallops.  · Gastrointestinal  o Abdominal Examination  o : Soft, nontender, nondistended. No free fluid. Bowel sounds heard in all four quadrants.  · Extremities  o Extremities  o : Warm and well perfused. No pitting pedal edema. Distal pulses present.  · Labs  o Labs  o : No recent labs available for review.          Assessment     ASSESSMENT AND PLAN:  1.  Coronary artery disease: Previous myocardial infarction and angioplasty. Currently stable with no angina-       like symptoms. Will continue aspirin, Brilinta, and statin at this time. Brilinta may be discontinued in June of        this year after completing three years.  2.  Hyperlipidemia:  Continue Atorvastatin. Previously well controlled lipids. We will check lipid panel before        next visit.  3.  The patient is going through a significant depression and is being currently considered for low-dose        ketamine infusion therapy. The side effects of  the medication include tachycardia and hypertension. Blood        and heart rate stable in our office today. From a cardiac " standpoint, it is okay to proceed with the planned        infusion.  4.  Followup with repeat labs.       Jose Luis Owens MD  JV/pap                Electronically Signed by: Marina Toney-, Other -Author on February 6, 2021 10:08:51 AM  Electronically Co-signed by: Jose Luis Owens MD -Reviewer on February 8, 2021 12:42:30 PM

## 2021-05-15 VITALS
SYSTOLIC BLOOD PRESSURE: 118 MMHG | DIASTOLIC BLOOD PRESSURE: 76 MMHG | BODY MASS INDEX: 27.82 KG/M2 | HEIGHT: 63 IN | HEART RATE: 96 BPM | WEIGHT: 157 LBS

## 2021-05-15 VITALS
DIASTOLIC BLOOD PRESSURE: 70 MMHG | SYSTOLIC BLOOD PRESSURE: 110 MMHG | BODY MASS INDEX: 28.17 KG/M2 | HEIGHT: 63 IN | WEIGHT: 159 LBS | HEART RATE: 100 BPM

## 2021-05-15 VITALS
HEART RATE: 81 BPM | HEIGHT: 63 IN | WEIGHT: 145.5 LBS | BODY MASS INDEX: 25.78 KG/M2 | SYSTOLIC BLOOD PRESSURE: 129 MMHG | DIASTOLIC BLOOD PRESSURE: 75 MMHG

## 2021-05-15 VITALS
SYSTOLIC BLOOD PRESSURE: 110 MMHG | HEART RATE: 95 BPM | HEIGHT: 63 IN | BODY MASS INDEX: 28.35 KG/M2 | DIASTOLIC BLOOD PRESSURE: 72 MMHG | WEIGHT: 160 LBS

## 2021-05-15 VITALS
BODY MASS INDEX: 26.4 KG/M2 | SYSTOLIC BLOOD PRESSURE: 126 MMHG | DIASTOLIC BLOOD PRESSURE: 62 MMHG | HEART RATE: 100 BPM | HEIGHT: 63 IN | WEIGHT: 149 LBS

## 2021-05-16 VITALS
DIASTOLIC BLOOD PRESSURE: 74 MMHG | HEART RATE: 106 BPM | BODY MASS INDEX: 29.06 KG/M2 | SYSTOLIC BLOOD PRESSURE: 120 MMHG | WEIGHT: 164 LBS | HEIGHT: 63 IN

## 2021-05-16 VITALS
SYSTOLIC BLOOD PRESSURE: 112 MMHG | DIASTOLIC BLOOD PRESSURE: 70 MMHG | BODY MASS INDEX: 28.7 KG/M2 | WEIGHT: 162 LBS | HEIGHT: 63 IN | HEART RATE: 88 BPM

## 2021-06-02 ENCOUNTER — OFFICE VISIT CONVERTED (OUTPATIENT)
Dept: CARDIOLOGY | Facility: CLINIC | Age: 56
End: 2021-06-02
Attending: INTERNAL MEDICINE

## 2021-06-05 NOTE — PROGRESS NOTES
Progress Note      Patient Name: Chely Peoples   Patient ID: [id number]   Sex: Female   YOB: 1965    Primary Care Provider: Narcisa MCHUGH   Referring Provider: Narcisa MCHUGH    Visit Date: June 2, 2021    Provider: Jose Luis Owens MD   Location: [facility]   Location Address: [address] [place], [place] [address]an [facility]  [address] [place] [address]  [id number]   Location Phone: [phone]          Chief Complaint     Follow-up visit for coronary artery disease and hyperlipidemia.         History Of Present Illness  REFERRING CARE PROVIDER: Narcisa MCHUGH   Chely Peoples is a 56 year old /White female with premature coronary artery disease, previous myocardial infarction angioplasty who is here for 6 month follow-up visit. Today, the patient reports feeling fine, denies having any episodes of chest pain, shortness of breath, palpitations or dizziness. Taking all the medicines as prescribed, however, she is not taking aspirin daily.   PAST MEDICAL HISTORY: (1) Coronary artery disease, index presentation being non-STEMI on 06/29/2018 followed by angioplasty and stent placement in the right coronary artery. She was also noted to have a 60% stenosis involving the mid left anterior descending artery with calcification. (2) Hyperlipidemia. (3) Hypothyroidism. (4) Negative for diabetes mellitus.   PSYCHOSOCIAL HISTORY: Previously smoked, but quit. Vapes.   CURRENT MEDICATIONS: Medications have been reviewed and are as documented.      ALLERGIES:  Tetracycline.       Review of Systems  · Cardiovascular  o Denies  o : palpitations (fast, fluttering, or skipping beats), swelling (feet, ankles, hands), shortness of breath while walking or lying flat, chest pain or angina pectoris   · Respiratory  o Denies  o : chronic or frequent cough      Vitals  Date Time BP Position Site L\R Cuff Size HR RR TEMP (F) WT  HT  BMI kg/m2 BSA m2 O2 Sat FR L/min FiO2 HC       06/02/2021 02:44 PM  "130/78 Sitting    92 - R   142lbs 0oz 5'  3\" 25.15 1.69             Physical Examination  · Respiratory  o Auscultation of Lungs  o : Clear to auscultation bilaterally. No crackles or rhonchi.  · Cardiovascular  o Heart  o : S1, S2 is normally heard. No S3. No murmur, rubs, or gallops.  · Gastrointestinal  o Abdominal Examination  o : Soft, nontender, nondistended. No free fluid. Bowel sounds heard in all four quadrants.  · Extremities  o Extremities  o : Warm and well perfused. No pitting pedal edema. Distal pulses present.  · Labs  o Labs  o : No recent labs available for review.          Assessment     1.  Coronary artery disease. Angioplasty in 2018, currently stable with no angina. left ventricular function is preserved. We will discontinue Brilinta now, however, the patient was encouraged and counseled strongly to take aspirin 81 mg on a daily basis. Continue statins as well.  2.  Hyperlipidemia, continue atorvastatin. We will check lipid panel now and adjust the dose if needed.  3.  We will follow the lab reports and otherwise follow-up in 9 months.        Jose Luis Owens MD  JV/vh               Electronically Signed by: Gretta Vidal-, OT -Author on Kay 3, 2021 12:51:45 PM  Electronically Co-signed by: Jose Luis Owens MD -Reviewer on Kay 3, 2021 02:12:43 PM  "

## 2021-07-02 ENCOUNTER — TELEPHONE (OUTPATIENT)
Dept: FAMILY MEDICINE CLINIC | Facility: CLINIC | Age: 56
End: 2021-07-02

## 2021-07-02 NOTE — TELEPHONE ENCOUNTER
PATIENT CALLBACK AND STATES THAT SHE HAD 05/24/2021 AT [facility]. PATIENT STATES THAT SHE HAS THE ID # THAT SHE PROVIDED [id number].  DR. MARGARITA ORR MD RADIOLOGIST        THANKS

## 2021-07-02 NOTE — TELEPHONE ENCOUNTER
LMTCB    **HUB** may ask if patient ever had her mammogram and if she did, where did she go, what date did she go? And route patients response back to provider.

## 2021-07-06 ENCOUNTER — TELEPHONE (OUTPATIENT)
Dept: FAMILY MEDICINE CLINIC | Facility: CLINIC | Age: 56
End: 2021-07-06

## 2021-07-06 RX ORDER — LEVOTHYROXINE SODIUM 125 MCG
125 TABLET ORAL DAILY
Qty: 90 TABLET | Refills: 0 | Status: SHIPPED | OUTPATIENT
Start: 2021-07-06 | End: 2022-01-12 | Stop reason: SDUPTHER

## 2021-07-07 DIAGNOSIS — Z00.00 WELL FEMALE EXAM WITHOUT GYNECOLOGICAL EXAM: ICD-10-CM

## 2021-07-15 VITALS
HEART RATE: 92 BPM | SYSTOLIC BLOOD PRESSURE: 130 MMHG | DIASTOLIC BLOOD PRESSURE: 78 MMHG | BODY MASS INDEX: 25.16 KG/M2 | WEIGHT: 142 LBS | HEIGHT: 63 IN

## 2021-07-22 ENCOUNTER — OFFICE VISIT (OUTPATIENT)
Dept: FAMILY MEDICINE CLINIC | Facility: CLINIC | Age: 56
End: 2021-07-22

## 2021-07-22 VITALS
DIASTOLIC BLOOD PRESSURE: 70 MMHG | TEMPERATURE: 98 F | BODY MASS INDEX: 26.05 KG/M2 | HEIGHT: 63 IN | WEIGHT: 147 LBS | SYSTOLIC BLOOD PRESSURE: 127 MMHG | OXYGEN SATURATION: 98 % | HEART RATE: 80 BPM

## 2021-07-22 DIAGNOSIS — E78.5 HYPERLIPIDEMIA, UNSPECIFIED HYPERLIPIDEMIA TYPE: ICD-10-CM

## 2021-07-22 DIAGNOSIS — E53.8 VITAMIN B12 DEFICIENCY: ICD-10-CM

## 2021-07-22 DIAGNOSIS — E03.9 HYPOTHYROIDISM, UNSPECIFIED TYPE: Primary | ICD-10-CM

## 2021-07-22 DIAGNOSIS — E55.9 VITAMIN D DEFICIENCY: ICD-10-CM

## 2021-07-22 PROCEDURE — 99213 OFFICE O/P EST LOW 20 MIN: CPT | Performed by: NURSE PRACTITIONER

## 2021-07-22 NOTE — PROGRESS NOTES
"Chief Complaint  Hypothyroidism    Subjective          Chely Peoples presents to [facility]  History of Present Illness  Hypothyroidism: The patient reports doing well. Symptoms: none. The patient is adherent to their medication regimen. The patient is not exercising. The patient does use electronic cigarette. Due for: TSH.    Hyperlipidemia: Symptoms: none. Medications:The patient is adherent with their medication regimen. Medication(s): statin. The patient is due for lipid panel and liver function tests .  Objective   Vital Signs:   /70   Pulse 80   Temp 98 °F (36.7 °C) (Temporal)   Ht 160 cm (63\")   Wt 66.7 kg (147 lb)   SpO2 98%   BMI 26.04 kg/m²     Physical Exam  Vitals and nursing note reviewed.   Constitutional:       Appearance: Normal appearance.   Cardiovascular:      Rate and Rhythm: Normal rate and regular rhythm.      Heart sounds: Normal heart sounds.   Pulmonary:      Effort: Pulmonary effort is normal.      Breath sounds: Normal breath sounds.   Neurological:      Mental Status: She is alert and oriented to person, place, and time.   Psychiatric:         Mood and Affect: Mood normal.        Result Review :   The following data was reviewed by: LOLITA Kimbrough on 07/22/2021:  TSH    TSH 8/6/20 1/12/21 3/22/21   TSH 5.550 (A) 13.400 (A) 1.960   (A) Abnormal value                   Assessment and Plan    Diagnoses and all orders for this visit:    1. Hypothyroidism, unspecified type (Primary)  -     TSH    2. Hyperlipidemia, unspecified hyperlipidemia type  -     Lipid Panel With / Chol / HDL Ratio  -     Comprehensive Metabolic Panel    3. Vitamin D deficiency  -     Vitamin D 25 Hydroxy    4. Vitamin B12 deficiency  -     Vitamin B12      I spent 20 minutes caring for Chely on this date of service. This time includes time spent by me in the following activities:reviewing tests, performing a medically appropriate examination and/or evaluation , " counseling and educating the patient/family/caregiver, ordering medications, tests, or procedures and documenting information in the medical record  Follow Up   No follow-ups on file.  Patient was given instructions and counseling regarding her condition or for health maintenance advice. Please see specific information pulled into the AVS if appropriate.

## 2021-07-23 LAB
25(OH)D3+25(OH)D2 SERPL-MCNC: 31.5 NG/ML (ref 30–100)
ALBUMIN SERPL-MCNC: 4.6 G/DL (ref 3.5–5.2)
ALBUMIN/GLOB SERPL: 2.3 G/DL
ALP SERPL-CCNC: 120 U/L (ref 39–117)
ALT SERPL-CCNC: 8 U/L (ref 1–33)
AST SERPL-CCNC: 15 U/L (ref 1–32)
BILIRUB SERPL-MCNC: 0.3 MG/DL (ref 0–1.2)
BUN SERPL-MCNC: 9 MG/DL (ref 6–20)
BUN/CREAT SERPL: 15.3 (ref 7–25)
CALCIUM SERPL-MCNC: 9.7 MG/DL (ref 8.6–10.5)
CHLORIDE SERPL-SCNC: 104 MMOL/L (ref 98–107)
CHOLEST SERPL-MCNC: 132 MG/DL (ref 0–200)
CHOLEST/HDLC SERPL: 2.93 {RATIO}
CO2 SERPL-SCNC: 28.2 MMOL/L (ref 22–29)
CREAT SERPL-MCNC: 0.59 MG/DL (ref 0.57–1)
GLOBULIN SER CALC-MCNC: 2 GM/DL
GLUCOSE SERPL-MCNC: 81 MG/DL (ref 65–99)
HDLC SERPL-MCNC: 45 MG/DL (ref 40–60)
LDLC SERPL CALC-MCNC: 71 MG/DL (ref 0–100)
POTASSIUM SERPL-SCNC: 4.2 MMOL/L (ref 3.5–5.2)
PROT SERPL-MCNC: 6.6 G/DL (ref 6–8.5)
SODIUM SERPL-SCNC: 142 MMOL/L (ref 136–145)
TRIGL SERPL-MCNC: 81 MG/DL (ref 0–150)
TSH SERPL DL<=0.005 MIU/L-ACNC: 1.17 UIU/ML (ref 0.27–4.2)
VIT B12 SERPL-MCNC: 823 PG/ML (ref 211–946)
VLDLC SERPL CALC-MCNC: 16 MG/DL (ref 5–40)

## 2021-11-08 RX ORDER — ATORVASTATIN CALCIUM 40 MG/1
TABLET, FILM COATED ORAL
Qty: 30 TABLET | Refills: 6 | Status: SHIPPED | OUTPATIENT
Start: 2021-11-08 | End: 2021-12-14 | Stop reason: SDUPTHER

## 2021-12-14 RX ORDER — ATORVASTATIN CALCIUM 40 MG/1
40 TABLET, FILM COATED ORAL
Qty: 90 TABLET | Refills: 0 | Status: SHIPPED | OUTPATIENT
Start: 2021-12-14 | End: 2021-12-20 | Stop reason: SDUPTHER

## 2021-12-20 RX ORDER — ATORVASTATIN CALCIUM 40 MG/1
40 TABLET, FILM COATED ORAL
Qty: 90 TABLET | Refills: 0 | Status: SHIPPED | OUTPATIENT
Start: 2021-12-20 | End: 2022-01-12 | Stop reason: SDUPTHER

## 2022-01-12 ENCOUNTER — OFFICE VISIT (OUTPATIENT)
Dept: FAMILY MEDICINE CLINIC | Age: 57
End: 2022-01-12

## 2022-01-12 VITALS
WEIGHT: 159.4 LBS | BODY MASS INDEX: 28.24 KG/M2 | SYSTOLIC BLOOD PRESSURE: 137 MMHG | HEIGHT: 63 IN | DIASTOLIC BLOOD PRESSURE: 86 MMHG | TEMPERATURE: 98.1 F | HEART RATE: 89 BPM

## 2022-01-12 DIAGNOSIS — Z23 ENCOUNTER FOR IMMUNIZATION: Primary | ICD-10-CM

## 2022-01-12 DIAGNOSIS — Z91.51 HISTORY OF SUICIDE ATTEMPT: ICD-10-CM

## 2022-01-12 DIAGNOSIS — F32.A ANXIETY AND DEPRESSION: ICD-10-CM

## 2022-01-12 DIAGNOSIS — E78.5 HYPERLIPIDEMIA, UNSPECIFIED HYPERLIPIDEMIA TYPE: ICD-10-CM

## 2022-01-12 DIAGNOSIS — F41.9 ANXIETY AND DEPRESSION: ICD-10-CM

## 2022-01-12 DIAGNOSIS — E03.9 HYPOTHYROIDISM, UNSPECIFIED TYPE: ICD-10-CM

## 2022-01-12 PROCEDURE — 91300 COVID-19 (PFIZER): CPT | Performed by: NURSE PRACTITIONER

## 2022-01-12 PROCEDURE — 99214 OFFICE O/P EST MOD 30 MIN: CPT | Performed by: NURSE PRACTITIONER

## 2022-01-12 PROCEDURE — 0003A COVID-19 (PFIZER): CPT | Performed by: NURSE PRACTITIONER

## 2022-01-12 PROCEDURE — 90686 IIV4 VACC NO PRSV 0.5 ML IM: CPT | Performed by: NURSE PRACTITIONER

## 2022-01-12 PROCEDURE — 90471 IMMUNIZATION ADMIN: CPT | Performed by: NURSE PRACTITIONER

## 2022-01-12 RX ORDER — ATORVASTATIN CALCIUM 40 MG/1
40 TABLET, FILM COATED ORAL
Qty: 90 TABLET | Refills: 0 | Status: SHIPPED | OUTPATIENT
Start: 2022-01-12 | End: 2022-03-09 | Stop reason: SDUPTHER

## 2022-01-12 RX ORDER — LEVOTHYROXINE SODIUM 125 MCG
125 TABLET ORAL DAILY
Qty: 90 TABLET | Refills: 0 | Status: SHIPPED | OUTPATIENT
Start: 2022-01-12 | End: 2022-04-15 | Stop reason: DRUGHIGH

## 2022-01-12 NOTE — PROGRESS NOTES
Chief Complaint  Chely Peoples presents to [facility] for Establish Care (pt has been out of depression medications for a few weeks) and Depression    Subjective          Establish care ;  Recently changed insurance   Beginning of year - old provider was unable to accept insurance     Has been off medication for about 2 weeks    Other than her citalopram/ adderall   Still has about 1 week of citalopram -  Adderall last filled Dec 17   Still has clonazepam 2 days worth      NIMCO is here for follow up on depression.  She reports that the current treatment is generally effective, but she is out of medication and needing to establish care with a mental health provider   NIMCO would like to discuss further recommendations.   She denies current suicidal and homicidal plan or intent.  She does have history of suicide attempts in the past  - as soon as last year.    Current symptoms include:depressed mood, feelings of worthlessness/guilt, hopelessness, insomnia and psychomotor retardation.     Previous treatment includes individual therapy and medication.   She complains of the following side effects from the treatment: none.         Review of Systems      Allergies   Allergen Reactions   • Valium [Diazepam]    • Alprazolam Other (See Comments)     Other reaction(s): Other (See Comments)  Makes hyper   • Tetracyclines & Related Rash      Past Medical History:   Diagnosis Date   • Anxiety    • Anxiety and depression    • BMI 29.0-29.9,adult    • CAD (coronary artery disease)    • COPD (chronic obstructive pulmonary disease) ([facility])    • Depression    • Depression    • History of anemia    • History of suicide attempt    • History of viral gastroenteritis    • Hypothyroidism    • Suicide attempt ([facility])      Current Outpatient Medications   Medication Sig Dispense Refill   • amphetamine-dextroamphetamine (ADDERALL) 20 MG tablet Take 1 tablet by mouth Daily. Taper medication to 1  "tablet, 20 mg by mouth daily in the am for 7 days, -`22.   0   • atorvastatin (LIPITOR) 40 MG tablet Take 1 tablet by mouth every night at bedtime. 90 tablet 0   • clonazePAM (KlonoPIN) 0.5 MG tablet TK 1 T PO QHS  2   • Synthroid 125 MCG tablet Take 1 tablet by mouth Daily. 90 tablet 0   • citalopram (CeleXA) 40 MG tablet Take 1 tablet by mouth Every Night for 60 days. Currently taking 2 of the 20 mg nightly  Indications: Depression, Generalized Anxiety Disorder 30 tablet 1     No current facility-administered medications for this visit.     Past Surgical History:   Procedure Laterality Date   • APPENDECTOMY     •  SECTION     • HYSTERECTOMY      Not due to cancer   • OOPHORECTOMY     • TONSILLECTOMY        Social History     Tobacco Use   • Smoking status: Former Smoker     Packs/day: 1.00     Types: Cigarettes, Electronic Cigarette     Quit date: 2021     Years since quittin.8   • Smokeless tobacco: Never Used   • Tobacco comment: smokes less than 1 pack per day for 35 years   Vaping Use   • Vaping Use: Former   Substance Use Topics   • Alcohol use: Not Currently   • Drug use: No     Family History   Problem Relation Age of Onset   • COPD Mother    • Heart disease Other         FH in males before the age of 55   • Suicide Attempts Nephew      Health Maintenance Due   Topic Date Due   • MAMMOGRAM  2014   • ZOSTER VACCINE (1 of 2) Never done   • ANNUAL PHYSICAL  2021      Immunization History   Administered Date(s) Administered   • COVID-19 (PFIZER) PURPLE CAP 2021, 2021, 2022   • FluLaval/Fluarix/Fluzone >6 10/15/2019, 2022   • Hepatitis A 2018   • Pneumococcal Polysaccharide (PPSV23) 2020   • Tdap 2020        Objective     Vitals:    22 1344   BP: 137/86   BP Location: Left arm   Patient Position: Sitting   Pulse: 89   Temp: 98.1 °F (36.7 °C)   TempSrc: Oral   Weight: 72.3 kg (159 lb 6.4 oz)   Height: 160 cm (62.99\")     Body " mass index is 28.24 kg/m².     Physical Exam  Constitutional:       General: She is not in acute distress.     Appearance: Normal appearance.   HENT:      Head: Normocephalic.   Cardiovascular:      Rate and Rhythm: Normal rate and regular rhythm.   Pulmonary:      Effort: Pulmonary effort is normal.      Breath sounds: Normal breath sounds.   Musculoskeletal:         General: Normal range of motion.   Neurological:      General: No focal deficit present.      Mental Status: She is alert and oriented to person, place, and time.   Psychiatric:         Mood and Affect: Mood is depressed. Affect is flat and tearful.         Behavior: Behavior is slowed.           Result Review :                               Assessment and Plan      Diagnoses and all orders for this visit:    1. Encounter for immunization (Primary)  -     FluLaval/Fluarix/Fluzone >6 Months ([id number])  -     COVID-19 Vaccine (Pfizer)    2. Anxiety and depression  Comments:  refills provided today - referral to psychiatry for ongoing management   Assessment & Plan:  Patient's depression is recurrent and is severe without psychosis. Their depression is currently active and the condition is worsening. This will be reassessed scheduling with Psychiatry. F/U as described:patient referred to Mental Health Specialist.  Medication refills provided today and appt for next week scheduled with Guera Arzate - to ER if suicidal or worsening     Orders:  -     Discontinue: Effexor  MG 24 hr capsule; Take 1 capsule by mouth Daily.  Dispense: 30 capsule; Refill: 1  -     Ambulatory Referral to Psychiatry    3. History of suicide attempt  Comments:  referral to psychiatry for ongoing management   Assessment & Plan:  History  in 2020  Hospitalized   - denies current suicidal ideations      4. Hyperlipidemia, unspecified hyperlipidemia type  Comments:  refills provided today  labs recommended when stable  Assessment & Plan:  Lipid abnormalities are  unchanged.  Pharmacotherapy as ordered.  Lipids will be reassessed in 6 months.    Orders:  -     atorvastatin (LIPITOR) 40 MG tablet; Take 1 tablet by mouth every night at bedtime.  Dispense: 90 tablet; Refill: 0    5. Hypothyroidism, unspecified type  Comments:  refills  labs recommended when stable   Assessment & Plan:  Has been off medications for 2 weeks, has been stable for the past year   Needs refills    Orders:  -     Synthroid 125 MCG tablet; Take 1 tablet by mouth Daily.  Dispense: 90 tablet; Refill: 0            Follow Up     No follow-ups on file.

## 2022-01-12 NOTE — ASSESSMENT & PLAN NOTE
Psychological condition is worsening.  Continue current treatment regimen.  Referral to psychiatry.  Psychological condition  will be reassessed at the next regular appointment.

## 2022-01-12 NOTE — ASSESSMENT & PLAN NOTE
Patient's depression is recurrent and is severe without psychosis. Their depression is currently active and the condition is worsening. This will be reassessed scheduling with Psychiatry. F/U as described:patient referred to Mental Health Specialist.  Medication refills provided today and appt for next week scheduled with Guera Arzate - to ER if suicidal or worsening

## 2022-01-13 ENCOUNTER — PATIENT ROUNDING (BHMG ONLY) (OUTPATIENT)
Dept: FAMILY MEDICINE CLINIC | Age: 57
End: 2022-01-13

## 2022-01-13 NOTE — PROGRESS NOTES
January 13, 2022    Hello, may I speak with Chely Peoples?    My name is AJ     I am  with [facility]  [address] [facility] [address] [facility] [address]  [facility] [address] [phone].    Before we get started may I verify your date of birth? 1965    I am calling to officially welcome you to our practice and ask about your recent visit. Is this a good time to talk? YES    Tell me about your visit with us. What things went well? Everything was good. Nusrat Malcolm was nice and professional.       We're always looking for ways to make our patients' experiences even better. Do you have recommendations on ways we may improve?  NO    Overall were you satisfied with your first visit to our practice? YES       I appreciate you taking the time to speak with me today. Is there anything else I can do for you? NO      Thank you, and have a great day.

## 2022-01-18 ENCOUNTER — CLINICAL SUPPORT (OUTPATIENT)
Dept: FAMILY MEDICINE CLINIC | Age: 57
End: 2022-01-18

## 2022-01-18 ENCOUNTER — OFFICE VISIT (OUTPATIENT)
Dept: BEHAVIORAL HEALTH | Facility: CLINIC | Age: 57
End: 2022-01-18

## 2022-01-18 VITALS
BODY MASS INDEX: 28.49 KG/M2 | DIASTOLIC BLOOD PRESSURE: 68 MMHG | HEIGHT: 63 IN | WEIGHT: 160.8 LBS | SYSTOLIC BLOOD PRESSURE: 120 MMHG

## 2022-01-18 DIAGNOSIS — F33.1 MAJOR DEPRESSIVE DISORDER, RECURRENT EPISODE, MODERATE: Primary | ICD-10-CM

## 2022-01-18 DIAGNOSIS — F41.1 GENERALIZED ANXIETY DISORDER: ICD-10-CM

## 2022-01-18 DIAGNOSIS — Z79.899 HIGH RISK MEDICATION USE: ICD-10-CM

## 2022-01-18 DIAGNOSIS — F41.9 ANXIETY AND DEPRESSION: Primary | ICD-10-CM

## 2022-01-18 DIAGNOSIS — Z87.898 HISTORY OF PROLONGED Q-T INTERVAL ON ECG: ICD-10-CM

## 2022-01-18 DIAGNOSIS — F32.A ANXIETY AND DEPRESSION: Primary | ICD-10-CM

## 2022-01-18 LAB
AMPHET+METHAMPHET UR QL: POSITIVE
AMPHETAMINES UR QL: NEGATIVE
BARBITURATES UR QL SCN: NEGATIVE
BENZODIAZ UR QL SCN: NEGATIVE
BUPRENORPHINE SERPL-MCNC: NEGATIVE NG/ML
CANNABINOIDS SERPL QL: NEGATIVE
COCAINE UR QL: NEGATIVE
EXPIRATION DATE: ABNORMAL
Lab: ABNORMAL
MDMA UR QL SCN: NEGATIVE
METHADONE UR QL SCN: NEGATIVE
OPIATES UR QL: NEGATIVE
OXYCODONE UR QL SCN: NEGATIVE
PCP UR QL SCN: NEGATIVE

## 2022-01-18 PROCEDURE — 80305 DRUG TEST PRSMV DIR OPT OBS: CPT | Performed by: NURSE PRACTITIONER

## 2022-01-18 PROCEDURE — 90792 PSYCH DIAG EVAL W/MED SRVCS: CPT | Performed by: NURSE PRACTITIONER

## 2022-01-18 RX ORDER — CITALOPRAM 40 MG/1
40 TABLET ORAL NIGHTLY
Qty: 30 TABLET | Refills: 1 | Status: SHIPPED | OUTPATIENT
Start: 2022-01-18 | End: 2022-02-17 | Stop reason: SDUPTHER

## 2022-01-18 NOTE — PROGRESS NOTES
Currently prescribed Adderall, will await for Klonopin metabolite to result for benzo confirmation.

## 2022-01-18 NOTE — PATIENT INSTRUCTIONS
1.  Please return to clinic at your next scheduled visit.  Contact the clinic ([phone]) at least 24 hours prior in the event you need to cancel.  2.  Do no harm to yourself or others.    3.  Avoid alcohol and drugs.    4.  Take all medications as prescribed.  Please contact the clinic with any concerns. If you are in need of medication refills, please call the clinic at [phone].    5. Should you want to get in touch with your provider, LOLITA Huynh, please utilize [employer] or contact the office ([phone]), and staff will be able to page the provider on call directly.  6.  In the event you have personal crisis, contact the following crisis numbers: Suicide Prevention Hotline 1-272.517.5625; [facility] Helpline 5-217-560-QABX; [facility] Emergency Room [phone]; text HELLO to 254526; or 147.     SPECIFIC RECOMMENDATIONS:     1.      Medications discussed at this encounter:                   - Stop taking the Effexor XR  Continue taking Celexa 40 mg total nightly (you have 20 mg in supply so continue taking 2 of the 20 mg to equal 40 mg) I have ordered the 40 mg tablet for you once the current bottle runs out.  Continue Adderall 20 mg by mouth twice daily, will need to discuss at next visit how to taper down dose.  Continue Klonopin 0.5 mg by mouth nightly.     2.      Psychotherapy recommendations: n/a    3.   LABS- EKG and urine drug screen      3.     Return to clinic: 2 weeks
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
## 2022-01-19 DIAGNOSIS — Z79.899 HIGH RISK MEDICATION USE: ICD-10-CM

## 2022-01-19 DIAGNOSIS — Z87.898 HISTORY OF PROLONGED Q-T INTERVAL ON ECG: ICD-10-CM

## 2022-01-19 PROCEDURE — 93000 ELECTROCARDIOGRAM COMPLETE: CPT | Performed by: NURSE PRACTITIONER

## 2022-01-20 PROCEDURE — G0480 DRUG TEST DEF 1-7 CLASSES: HCPCS | Performed by: NURSE PRACTITIONER

## 2022-01-21 ENCOUNTER — TELEPHONE (OUTPATIENT)
Dept: PSYCHIATRY | Facility: CLINIC | Age: 57
End: 2022-01-21

## 2022-01-21 NOTE — TELEPHONE ENCOUNTER
Patient called to discuss taper of Adderall from 20 mg twice daily to daily for 7 days.  Patient agreeable to plan, patient reported writing down instructions to only take one pill daily starting today, as patient took a dose this morning, and the last dose will be on Thurs. 1/27/22.  Patient to contact provider for any problems or any new/worsening symptoms arise.  Next appointment 2/3/22.

## 2022-01-24 NOTE — PROGRESS NOTES
Just the clonazepam urine metabolite I ordered to determine how much of the clonazepam is in her system due to the negative benzo on this screen.

## 2022-01-25 NOTE — PROGRESS NOTES
The urine was pulled out on Friday by Yun Llanes and the other lady was making sure it was sent as she was in the chart. Please let me know what happened to this as I will need to ask her to come back in  thank you

## 2022-01-29 LAB — 7AMINOCLONAZEPAM UR-MCNC: 87 NG/ML

## 2022-02-03 ENCOUNTER — TELEMEDICINE (OUTPATIENT)
Dept: BEHAVIORAL HEALTH | Facility: CLINIC | Age: 57
End: 2022-02-03

## 2022-02-03 DIAGNOSIS — F33.1 MAJOR DEPRESSIVE DISORDER, RECURRENT EPISODE, MODERATE: Primary | ICD-10-CM

## 2022-02-03 DIAGNOSIS — F41.1 GENERALIZED ANXIETY DISORDER: ICD-10-CM

## 2022-02-03 PROCEDURE — 99214 OFFICE O/P EST MOD 30 MIN: CPT | Performed by: NURSE PRACTITIONER

## 2022-02-03 NOTE — PATIENT INSTRUCTIONS
1.  Please return to clinic at your next scheduled visit.  Contact the clinic ([phone]) at least 24 hours prior in the event you need to cancel.  2.  Do no harm to yourself or others.    3.  Avoid alcohol and drugs.    4.  Take all medications as prescribed.  Please contact the clinic with any concerns. If you are in need of medication refills, please call the clinic at [phone].    5. Should you want to get in touch with your provider, LOLITA Huynh, please utilize [employer] or contact the office ([phone]), and staff will be able to page the provider on call directly.  6.  In the event you have personal crisis, contact the following crisis numbers: Suicide Prevention Hotline 1-781.915.4762; [facility] Helpline 4-771-748-REWU; [facility] Emergency Room [phone]; text HELLO to 471496; or 170.     SPECIFIC RECOMMENDATIONS:     1.      Medications discussed at this encounter:                   - Continue Celexa 40 mg by mouth nightly  Continue Clonazepam (Klonopin) 0.5 mg by mouth nightly (Call office to request refill, last filled 1/13/22, able to place order 2/11/22. You can call next Thurs or Friday to request.)     2.      Psychotherapy recommendations: n/a     3.     Return to clinic: 2 weeks
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
## 2022-02-11 RX ORDER — CLONAZEPAM 0.5 MG/1
TABLET ORAL
Refills: 2 | Status: CANCELLED | OUTPATIENT
Start: 2022-02-11

## 2022-02-11 NOTE — TELEPHONE ENCOUNTER
Caller: Chely Peoples    Relationship: Self    Best call back number: [phone]     Requested Prescriptions:   Requested Prescriptions     Pending Prescriptions Disp Refills   • clonazePAM (KlonoPIN) 0.5 MG tablet  2        Pharmacy where request should be sent: [id number] - [place], [place] - [address] [place] [facility] [address] [facility] - [phone]  - [phone] FX     Additional details provided by patient: 4 DAYS LEFT     Does the patient have less than a 3 day supply:  [] Yes  [x] No    Fanta Padilla Rep   02/11/22 12:51 EST

## 2022-02-17 ENCOUNTER — TELEMEDICINE (OUTPATIENT)
Dept: BEHAVIORAL HEALTH | Facility: CLINIC | Age: 57
End: 2022-02-17

## 2022-02-17 DIAGNOSIS — F33.1 MAJOR DEPRESSIVE DISORDER, RECURRENT EPISODE, MODERATE: ICD-10-CM

## 2022-02-17 DIAGNOSIS — F33.9 RECURRENT MAJOR DEPRESSION RESISTANT TO TREATMENT: ICD-10-CM

## 2022-02-17 DIAGNOSIS — F41.1 GENERALIZED ANXIETY DISORDER: ICD-10-CM

## 2022-02-17 PROCEDURE — 99214 OFFICE O/P EST MOD 30 MIN: CPT | Performed by: NURSE PRACTITIONER

## 2022-02-17 RX ORDER — CITALOPRAM 40 MG/1
40 TABLET ORAL NIGHTLY
Qty: 30 TABLET | Refills: 3 | Status: SHIPPED | OUTPATIENT
Start: 2022-02-17 | End: 2022-07-06

## 2022-02-17 RX ORDER — CLONAZEPAM 0.5 MG/1
TABLET ORAL
Qty: 60 TABLET | Refills: 0 | Status: SHIPPED | OUTPATIENT
Start: 2022-02-17 | End: 2022-03-24 | Stop reason: DRUGHIGH

## 2022-02-17 NOTE — PATIENT INSTRUCTIONS
1.  Please return to clinic at your next scheduled visit.  Contact the clinic ([phone]) at least 24 hours prior in the event you need to cancel.  2.  Do no harm to yourself or others.    3.  Avoid alcohol and drugs.    4.  Take all medications as prescribed.  Please contact the clinic with any concerns. If you are in need of medication refills, please call the clinic at [phone].    5. Should you want to get in touch with your provider, LOLITA Huynh, please utilize [employer] or contact the office ([phone]), and staff will be able to page the provider on call directly.  6.  In the event you have personal crisis, contact the following crisis numbers: Suicide Prevention Hotline 1-357.346.9520; [facility] Helpline 4-814-348-VOFI; [facility] Room [phone]; text HELLO to 042032; or 007.     SPECIFIC RECOMMENDATIONS:     1.      Medications discussed at this encounter:                   - Continue Celexa 40 mg by mouth nightly  -Continue Klonopin 0.5 mg by mouth nightly and added dose of 0.5 mg by mouth daily as needed anxiety.   -Start Rexulti (Brexiprazole) 0.5 mg by mouth daily in the morning.     2.      Psychotherapy recommendations: n/a     3.     Return to clinic: 2 weeks     Brexpiprazole Oral Tablets  What is this medicine?  BREXPIPRAZOLE (brex PIP ray zole) is an antipsychotic. It is used to treat schizophrenia. This medicine is also used in combination with antidepressants to treat major depressive disorder.  This medicine may be used for other purposes; ask your health care provider or pharmacist if you have questions.  COMMON BRAND NAME(S): REXULTI  What should I tell my health care provider before I take this medicine?  They need to know if you have any of these conditions:  · dementia  · diabetes  · difficulty swallowing  · have trouble controlling your muscles  · have urges you are unable to control (for example, gambling, spending money, or eating)  · heart  disease  · high cholesterol  · history of breast cancer  · history of stroke  · kidney disease  · liver disease  · low blood counts, like low white cell, platelet, or red cell counts  · low blood pressure  · Parkinson's disease  · seizures  · suicidal thoughts, plans or attempt; a previous suicide attempt by you or a family member  · an unusual or allergic reaction to brexpiprazole, other medicines, foods, dyes, or preservatives  · pregnant or trying to get pregnant  · breast-feeding  How should I use this medicine?  Take this medicine by mouth with a glass of water. Follow the directions on the prescription label. You can take this medicine with or without food. Take your doses at regular intervals. Do not take your medicine more often than directed. Do not stop taking except on the advice of your doctor or health care professional.  A special MedGuide will be given to you by the pharmacist with each prescription and refill. Be sure to read this information carefully each time.  Talk to your pediatrician regarding the use of this medicine in children. Special care may be needed.  Overdosage: If you think you have taken too much of this medicine contact a poison control center or emergency room at once.  NOTE: This medicine is only for you. Do not share this medicine with others.  What if I miss a dose?  If you miss a dose, take it as soon as you can. If it is almost time for your next dose, take only that dose. Do not take double or extra doses.  What may interact with this medicine?  Do not take this medicine with any of the following medications:  · aripiprazole  · metoclopramide  This medicine may also interact with the following medications:  · antihistamines for allergy, cough, and cold  · certain medicines for anxiety or sleep  · certain medicines for depression like amitriptyline, duloxetine, fluoxetine, paroxetine, sertraline  · certain medicines for fungal infections like fluconazole, itraconazole,  ketoconazole  · clarithromycin  · general anesthetics like halothane, isoflurane, methoxyflurane, propofol  · levodopa or other medicines for Parkinson's disease  · medicines for blood pressure  · medicines that relax muscles for surgery  · medicines for seizures  · narcotic medicines for pain  · phenothiazines like chlorpromazine, prochlorperazine, thioridazine  · quinidine  · rifampin  · Isidro's Wort  This list may not describe all possible interactions. Give your health care provider a list of all the medicines, herbs, non-prescription drugs, or dietary supplements you use. Also tell them if you smoke, drink alcohol, or use illegal drugs. Some items may interact with your medicine.  What should I watch for while using this medicine?  Visit your health care professional for regular checks on your progress. Tell your health care professional if symptoms do not start to get better or if they get worse. Do not stop taking except on your health care professional's advice. You may develop a severe reaction. Your health care professional will tell you how much medicine to take.  Patients and their families should watch out for new or worsening depression or thoughts of suicide. Also watch out for sudden changes in feelings such as feeling anxious, agitated, panicky, irritable, hostile, aggressive, impulsive, severely restless, overly excited and hyperactive, or not being able to sleep. If this happens, especially at the beginning of antidepressant treatment or after a change in dose, call your health care professional.  You may get dizzy or drowsy. Do not drive, use machinery, or do anything that needs mental alertness until you know how this medicine affects you. Do not stand or sit up quickly, especially if you are an older patient. This reduces the risk of dizzy or fainting spells. Alcohol may interfere with the effect of this medicine. Avoid alcoholic drinks.  There are have been reports of increased sexual urges  or other strong urges such as gambling while taking this medicine. If you experience any of these while taking this medicine, you should report this to your health care professional as soon as possible.  This medicine may cause dry eyes and blurred vision. If you wear contact lenses you may feel some discomfort. Lubricating drops may help. See your eye doctor if the problem does not go away or is severe.  This medicine may increase blood sugar. Ask your health care provider if changes in diet or medicines are needed if you have diabetes.  This drug can cause problems with controlling your body temperature. It can lower the response of your body to cold temperatures. If possible, stay indoors during cold weather. If you must go outdoors, wear warm clothes. It can also lower the response of your body to heat. Do not overheat. Do not over-exercise. Stay out of the sun when possible. If you must be in the sun, wear cool clothing. Drink plenty of water. If you have trouble controlling your body temperature, call your health care provider right away.  What side effects may I notice from receiving this medicine?  Side effects that you should report to your doctor or health care professional as soon as possible:  · allergic reactions like skin rash, itching or hives, swelling of the face, lips, or tongue  · breathing problems  · confusion  · elevated mood, decreased need for sleep, racing thoughts, impulsive behavior  · fever or chills, sore throat  · inability to keep still  · new or increased gambling urges, sexual urges, uncontrolled spending, binge or compulsive eating, or other urges  · problems with balance, talking, walking  · seizures  · signs and symptoms of high blood sugar such as being more thirsty or hungry or having to urinate more than normal. You may also feel very tired or have blurry vision  · signs and symptoms of low blood pressure like dizziness; feeling faint or lightheaded, falls; unusually weak or  tired  · signs and symptoms of neuroleptic malignant syndrome like confusion; fast or irregular heartbeat; high fever; increased sweating; stiff muscles  · sudden numbness or weakness of the face, arm, or leg  · suicidal thoughts or other mood changes  · trouble swallowing  · uncontrollable movements of the arms, face, head, mouth, neck, or upper body  Side effects that usually do not require medical attention (report to your doctor or health care professional if they continue or are bothersome):  · constipation  · drowsiness  · headache  · stomach upset  · weight gain  This list may not describe all possible side effects. Call your doctor for medical advice about side effects. You may report side effects to FDA at 2-568-JOI-2998.  Where should I keep my medicine?  Keep out of the reach of children.  Store at room temperature between 15 and 30 degrees C (59 and 86 degrees F). Throw away any unused medicine after the expiration date.  NOTE: This sheet is a summary. It may not cover all possible information. If you have questions about this medicine, talk to your doctor, pharmacist, or health care provider.  © 2021 [employer] (2020-10-29 14:29:12)

## 2022-02-17 NOTE — PROGRESS NOTES
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 Clinician Name:  LOLIAT Huynh     Phone: [phone] or [phone]        Clinician Pager or Emergency Contact #     2.   Clinician Name: PCP or therapist      Phone:        Clinician Pager or Emergency Contact #     3. Local Emergency Care Services: [facility] Emergency Department      Emergency Care Services Address: [address] [place] [address]      Emergency Care Services Phones: [phone]    4. 24/7 Suicide Prevention Lifeline Phone: [phone]    5. 24/7 Crisis Text Line Counseling: Text 'HOME' to 363554    Making the Environment Safe:     1. Pills - give to pharmacist or friend for disposal, do not keep medications you no longer are prescribed      The one thing that is most important to me and worth living for is: My family, salvation, my children.      (From BH[employer], [employer].K. 2011). Safety Planning Intervention: A brief intervention to mitigate suicide risk. [facility]. 19, [phone]    Patient has verbally agreed to Patient Safety Plan listed above.     This document has been electronically signed by LOLITA Huynh  February 18, 2022 08:56 EST

## 2022-02-23 ENCOUNTER — TELEPHONE (OUTPATIENT)
Dept: PSYCHIATRY | Facility: CLINIC | Age: 57
End: 2022-02-23

## 2022-02-23 DIAGNOSIS — F33.1 MDD (MAJOR DEPRESSIVE DISORDER), RECURRENT EPISODE, MODERATE: Primary | ICD-10-CM

## 2022-02-23 NOTE — TELEPHONE ENCOUNTER
Left voicemail informing patient Rexulti was not approved per PA from insurance and to return call to [place] office at [phone] to discuss alternative options insurance will cover.   Discontinued Rexulti from medication list

## 2022-02-24 ENCOUNTER — TELEPHONE (OUTPATIENT)
Dept: BEHAVIORAL HEALTH | Facility: CLINIC | Age: 57
End: 2022-02-24

## 2022-02-24 NOTE — TELEPHONE ENCOUNTER
Spoke with patient about denial of Rexulti and insurance requires at least a 2 week trial of another medication beforehand, discussed with patient trying Vraylar 1.5 mg by mouth daily for depression, Risks, benefits, alternatives discussed with patient including nausea and vomiting, GI upset, sedation, akathisia, theoretical risk of tardive dyskinesia, and weight gain. After discussion of these risks and benefits, the patient voiced understanding and agreed to proceed. Informed patient MA would contact patient tomorrow to ensure Vraylar was approved and picked up and may need to change appointment patient has scheduled next week on 3/3/22 to a later date, which will be decided after tomorrow.

## 2022-02-25 NOTE — TELEPHONE ENCOUNTER
Lety called patient's daughter (as consent shows) LMVM that Guera will reach out and call Monday or Tuesday.  Lety also called patient and LMVM to call office to discuss meds as per Guera's request.

## 2022-02-28 ENCOUNTER — TELEPHONE (OUTPATIENT)
Dept: PSYCHIATRY | Facility: CLINIC | Age: 57
End: 2022-02-28

## 2022-02-28 NOTE — TELEPHONE ENCOUNTER
Received call back from patient daughter, Kristen, regarding mother.  Patient daughter voices concerns with mother of feeling not being heard, explained prior conversations with patient in regards to treatment plan and PA approvals and denials which effected timeliness of process.    Patient daughter concerned of patient mood fluctuations and possible bipolar.  Reports mother had watched a [employer]GT[employer] show and wanted to go to this conference, and quit her job.  Patient daughter expresses mother often quits jobs after short duration, will cut out son's from their life off and on, irrational decisions though to mother they are justified.   Patient daughter expresses patient can feel very good, decided to tear up bathroom without asking for help, patient removed toilet, put up new walls, these actions are random, will often start projects then not complete.  Other times that drive will not last long.  Patient was going to move down to where HGTV show was in the past.  Patient behavior is affecting relationships, noting paranoia, and various up and down mood fluctuations which daughter describes as symptoms of bipolar.   In a one month time frame patient will have son's in life and then not.  On Sunday doing great while at [employer], then Monday patient was down, depressed, and did not hear from mother for one week.    Daughter did speak with patient yesterday, and patient voiced not talking with anyone this past week.  Patient will not answer calls and shuts down.   Patient has voiced random impulsive decisions and has acted on in the past.   When patient daughter has attempted to discuss possible bipolar with mother, patient shuts down.   Patient has been struggling most of daughter's life.   Reports patient did start Vraylar. And daughter reports patient did attend [employer] yesterday which was an improvement as patient tends to self isolate for days.    Will plan on further assessing Bipolar symptoms with patient this week at  visit.  Daughter to continue to be observant of mother's behaviors and report to provider as needed.

## 2022-03-03 ENCOUNTER — OFFICE VISIT (OUTPATIENT)
Dept: BEHAVIORAL HEALTH | Facility: CLINIC | Age: 57
End: 2022-03-03

## 2022-03-03 VITALS
DIASTOLIC BLOOD PRESSURE: 70 MMHG | HEART RATE: 91 BPM | SYSTOLIC BLOOD PRESSURE: 128 MMHG | BODY MASS INDEX: 29.13 KG/M2 | HEIGHT: 63 IN | OXYGEN SATURATION: 97 % | WEIGHT: 164.4 LBS

## 2022-03-03 DIAGNOSIS — F41.1 GENERALIZED ANXIETY DISORDER: ICD-10-CM

## 2022-03-03 DIAGNOSIS — F33.1 MAJOR DEPRESSIVE DISORDER, RECURRENT EPISODE, MODERATE: Primary | ICD-10-CM

## 2022-03-03 DIAGNOSIS — F33.9 RECURRENT MAJOR DEPRESSION RESISTANT TO TREATMENT: ICD-10-CM

## 2022-03-03 PROCEDURE — 99214 OFFICE O/P EST MOD 30 MIN: CPT | Performed by: NURSE PRACTITIONER

## 2022-03-03 RX ORDER — CITALOPRAM 20 MG/1
TABLET ORAL
COMMUNITY
Start: 2022-02-18 | End: 2022-03-03 | Stop reason: SDUPTHER

## 2022-03-03 NOTE — PATIENT INSTRUCTIONS
1.  Please return to clinic at your next scheduled visit.  Contact the clinic ([phone]) at least 24 hours prior in the event you need to cancel.  2.  Do no harm to yourself or others.    3.  Avoid alcohol and drugs.    4.  Take all medications as prescribed.  Please contact the clinic with any concerns. If you are in need of medication refills, please call the clinic at [phone].    5. Should you want to get in touch with your provider, LOLITA Huynh, please utilize iovox or contact the office ([phone]), and staff will be able to page the provider on call directly.  6.  In the event you have personal crisis, contact the following crisis numbers: Suicide Prevention Hotline 1-575.972.3295; [facility] Helpline 3-076-836-ACNY; [facility] Emergency Room [phone]; text HELLO to 273368; or 702.     SPECIFIC RECOMMENDATIONS:     1.      Medications discussed at this encounter:                   - Continue Vraylar 1.5 mg by mouth daily, START TAKING NIGHTLY BEGINNING TOMORROW Friday 3/5/22.  Celexa 40  Mg by mouth nightly, and Clonazepam 0.5 mg  by mouth daily as needed and 0.5 mg by mouth nightly       2.      Psychotherapy recommendations: n/a     3.     Return to clinic: 3 weeks    4.  Keep in contact with daughter and ask about her observations of your mood.   5.. Contact provider if irritability worsens or you experience inability to sit still.      Cariprazine Oral Capsules  What is this medicine?  CARIPRAZINE (car i PRA zeen) is an antipsychotic. It is used to treat schizophrenia or bipolar disorder. Bipolar disorder is also known as manic-depression.  This medicine may be used for other purposes; ask your health care provider or pharmacist if you have questions.  COMMON BRAND NAME(S): VRAYLAR  What should I tell my health care provider before I take this medicine?  They need to know if you have any of these conditions:  · dementia  · diabetes  · difficulty swallowing  · have  trouble controlling your muscles  · heart disease  · high cholesterol  · history of breast cancer  · history of stroke  · kidney disease  · liver disease  · low blood counts, like low white cell, platelet, or red cell counts  · low blood pressure  · Parkinson's disease  · seizures  · suicidal thoughts, plans or attempt; a previous suicide attempt by you or a family member  · an unusual or allergic reaction to cariprazine, other medicines, foods, dyes, or preservatives  · pregnant or trying to get pregnant  · breast-feeding  How should I use this medicine?  Take this medicine by mouth with a glass of water. Follow the directions on the prescription label. You may take it with or without food. Take your medicine at regular intervals. Do not take it more often than directed. Do not stop taking except on your doctor's advice.  A special MedGuide will be given to you by the pharmacist with each prescription and refill. Be sure to read this information carefully each time.  Talk to your pediatrician regarding the use of this medicine in children. Special care may be needed.  Overdosage: If you think you have taken too much of this medicine contact a poison control center or emergency room at once.  NOTE: This medicine is only for you. Do not share this medicine with others.  What if I miss a dose?  If you miss a dose, take it as soon as you can. If it is almost time for your next dose, take only that dose. Do not take double or extra doses.  What may interact with this medicine?  Do not take this medicine with any of the following medications:  · metoclopramide  This medicine may also interact with the following medications:  · antihistamines for allergy, cough, and cold  · carbamazepine  · certain medicines for anxiety or sleep  · certain medicines for depression like amitriptyline, fluoxetine, sertraline  · certain medicines for fungal infections like itraconazole, ketoconazole  · general anesthetics like halothane,  isoflurane, methoxyflurane, propofol  · levodopa or other medicines for Parkinson's disease  · medicines for blood pressure  · medicines for seizures  · medicines that relax muscles for surgery  · narcotic medicines for pain  · phenothiazines like chlorpromazine, prochlorperazine, thioridazine  · rifampin  This list may not describe all possible interactions. Give your health care provider a list of all the medicines, herbs, non-prescription drugs, or dietary supplements you use. Also tell them if you smoke, drink alcohol, or use illegal drugs. Some items may interact with your medicine.  What should I watch for while using this medicine?  Visit your health care professional for regular checks on your progress. Tell your health care professional if symptoms do not start to get better or if they get worse. Do not stop taking except on your health care professional's advice. You may develop a severe reaction. Your health care professional will tell you how much medicine to take.  Patients and their families should watch out for new or worsening depression or thoughts of suicide. Also watch out for sudden changes in feelings such as feeling anxious, agitated, panicky, irritable, hostile, aggressive, impulsive, severely restless, overly excited and hyperactive, or not being able to sleep. If this happens, especially at the beginning of treatment or after a change in dose, call your healthcare professional.  You may get dizzy or drowsy. Do not drive, use machinery, or do anything that needs mental alertness until you know how this medicine affects you. Do not stand or sit up quickly, especially if you are an older patient. This reduces the risk of dizzy or fainting spells. Alcohol may interfere with the effect of this medicine. Avoid alcoholic drinks.  This medicine may cause dry eyes and blurred vision. If you wear contact lenses you may feel some discomfort. Lubricating drops may help. See your eye doctor if the  problem does not go away or is severe.  This medicine may increase blood sugar. Ask your health care provider if changes in diet or medicines are needed if you have diabetes.  This drug can cause problems with controlling your body temperature. It can lower the response of your body to cold temperatures. If possible, stay indoors during cold weather. If you must go outdoors, wear warm clothes. It can also lower the response of your body to heat. Do not overheat. Do not over-exercise. Stay out of the sun when possible. If you must be in the sun, wear cool clothing. Drink plenty of water. If you have trouble controlling your body temperature, call your health care provider right away.  Women should inform their doctor if they wish to become pregnant or think they might be pregnant. The effects of this medicine on an unborn child are not known. A registry is available to monitor pregnancy outcomes in pregnant women exposed to this medicine or similar medicines. Talk to your health care professional or pharmacist for more information.  What side effects may I notice from receiving this medicine?  Side effects that you should report to your doctor or health care professional as soon as possible:  · allergic reactions like skin rash, itching or hives, swelling of the face, lips, or tongue  · breathing problems  · confusion  · fever or chills, sore throat  · inability to keep still  · problems with balance, talking, walking  · redness, blistering, peeling or loosening of the skin, including inside the mouth  · seizures  · signs and symptoms of high blood sugar such as being more thirsty or hungry or having to urinate more than normal. You may also feel very tired or have blurry vision  · signs and symptoms of low blood pressure like dizziness; feeling faint or lightheaded, falls; unusually weak or tired  · signs and symptoms of neuroleptic malignant syndrome such as confusion; fast or irregular heartbeat; high fever;  increased sweating; stiff muscles  · sudden numbness or weakness of the face, arm, or leg  · suicidal thoughts or other mood changes  · trouble swallowing  · uncontrollable movements of the arms, face, head, mouth, neck, or upper body  Side effects that usually do not require medical attention (report to your doctor or health care professional if they continue or are bothersome):  · constipation  · drowsiness  · nausea, vomiting  · upset stomach  · weight gain  This list may not describe all possible side effects. Call your doctor for medical advice about side effects. You may report side effects to FDA at 1-406-LNO-9816.  Where should I keep my medicine?  Keep out of the reach of children.  Store at room temperature between 15 and 30 degrees C (59 and 86 degrees F). Protect from light. Throw away any unused medicine after the expiration date.  NOTE: This sheet is a summary. It may not cover all possible information. If you have questions about this medicine, talk to your doctor, pharmacist, or health care provider.  © 2021 [employer]vier/Gold Standard (2020-10-29 15:13:10)

## 2022-03-08 ENCOUNTER — TELEPHONE (OUTPATIENT)
Dept: BEHAVIORAL HEALTH | Facility: CLINIC | Age: 57
End: 2022-03-08

## 2022-03-08 NOTE — TELEPHONE ENCOUNTER
"Patient's daughter would like to speak with you re: the way the patient is acting since starting new medication.  Changed just like a lightswitch all the sudden became wildly happy even \"embaressed her son the other day.  Patient's daughter wants to make sure that this is normal or if patient has taken a drastic turn \"the other way\".  Please call Lety. (on release to speak with in chart)  "

## 2022-03-08 NOTE — TELEPHONE ENCOUNTER
Left voicemail with patient daughter informing positive improvement of symptoms from Vraylar are as expected.  Return call if mood changes are negative, patient has too much energy, or staying awake for days.  Or any hypomania symptoms.

## 2022-03-09 ENCOUNTER — OFFICE VISIT (OUTPATIENT)
Dept: CARDIOLOGY | Facility: CLINIC | Age: 57
End: 2022-03-09

## 2022-03-09 VITALS
DIASTOLIC BLOOD PRESSURE: 66 MMHG | HEIGHT: 63 IN | WEIGHT: 165 LBS | HEART RATE: 74 BPM | BODY MASS INDEX: 29.23 KG/M2 | SYSTOLIC BLOOD PRESSURE: 135 MMHG

## 2022-03-09 DIAGNOSIS — E78.2 MIXED HYPERLIPIDEMIA: ICD-10-CM

## 2022-03-09 DIAGNOSIS — I25.10 CORONARY ARTERY DISEASE INVOLVING NATIVE CORONARY ARTERY OF NATIVE HEART WITHOUT ANGINA PECTORIS: Primary | ICD-10-CM

## 2022-03-09 PROCEDURE — 99213 OFFICE O/P EST LOW 20 MIN: CPT | Performed by: INTERNAL MEDICINE

## 2022-03-09 RX ORDER — ASPIRIN 81 MG/1
81 TABLET ORAL DAILY
COMMUNITY

## 2022-03-09 RX ORDER — ATORVASTATIN CALCIUM 40 MG/1
40 TABLET, FILM COATED ORAL
Qty: 90 TABLET | Refills: 2 | Status: SHIPPED | OUTPATIENT
Start: 2022-03-09

## 2022-03-09 NOTE — ASSESSMENT & PLAN NOTE
She is currently stable with no angina, completed 4 years since angioplasty.  LV function is preserved.  Continue aspirin and statin.  We will repeat echocardiogram next year to reassess the LV function.

## 2022-03-09 NOTE — ASSESSMENT & PLAN NOTE
Labs done in July of last year showed LDL of 71 which is near goal.  Continue atorvastatin 40 mg at bedtime.  We will repeat labs with next lab draw

## 2022-03-09 NOTE — PROGRESS NOTES
CARDIOLOGY FOLLOW-UP PROGRESS NOTE        Chief Complaint  Hyperlipidemia and Coronary Artery Disease (Follow-up)    Subjective            Chely Peoples presents to [facility] CARDIOLOGY  History of Present Illness      Ms. Evans is here for routine 9-month follow-up visit.  She completed 3 years of dual endplate therapy and is currently taking only aspirin and statins.  She denies any new complaints.  Specifically, denies any chest pain, shortness of breath or palpitations.  No dizziness or syncopal episodes.  No recent hospitalizations or ER visits.      Past History:    (1) Coronary artery disease, index presentation being non-STEMI on 2018 followed by angioplasty and stent placement in the right coronary artery. She was also noted to have a 60% stenosis involving the mid left anterior descending artery with calcification. (2) Hyperlipidemia. (3) Hypothyroidism. (4) Negative for diabetes mellitus.    Medical History:  Past Medical History:   Diagnosis Date   • Anxiety    • Anxiety and depression    • BMI 29.0-29.9,adult    • CAD (coronary artery disease)    • COPD (chronic obstructive pulmonary disease) (HCC)    • Depression    • Depression    • History of anemia    • History of suicide attempt    • History of viral gastroenteritis    • Hypothyroidism    • Suicide attempt (HCC)        Surgical History: has a past surgical history that includes  section; Hysterectomy; Tonsillectomy; Oophorectomy; and Appendectomy.     Family History: family history includes COPD in her mother; Heart disease in an other family member; Suicide Attempts in her nephew.     Social History: reports that she quit smoking about 11 months ago. Her smoking use included cigarettes and electronic cigarette. She smoked 1.00 pack per day. She has never used smokeless tobacco. She reports current alcohol use. She reports that she does not use drugs.    Allergies: Valium [diazepam], Alprazolam, and  "Tetracyclines & related    Current Outpatient Medications on File Prior to Visit   Medication Sig   • aspirin 81 MG EC tablet Take 81 mg by mouth Daily. Whenever she remembers to take it   • Cariprazine HCl (VRAYLAR) 1.5 MG capsule capsule Take 1 capsule by mouth Daily for 30 days. Indications: Major Depressive Disorder   • citalopram (CeleXA) 40 MG tablet Take 1 tablet by mouth Every Night for 120 days. Indications: Depression, Generalized Anxiety Disorder   • clonazePAM (KlonoPIN) 0.5 MG tablet Take 1 tablet by mouth Every Night. May also take 1 tablet Daily As Needed for Anxiety. Indications: Feeling Anxious   • Synthroid 125 MCG tablet Take 1 tablet by mouth Daily.   • atorvastatin (LIPITOR) 40 MG tablet Take 1 tablet by mouth every night at bedtime.         Review of Systems   Respiratory: Negative for cough, shortness of breath and wheezing.    Cardiovascular: Negative for chest pain, palpitations and leg swelling.   Gastrointestinal: Negative for nausea and vomiting.   Neurological: Negative for dizziness and syncope.        Objective     /66   Pulse 74   Ht 160 cm (63\")   Wt 74.8 kg (165 lb)   BMI 29.23 kg/m²       Physical Exam    General : Alert, awake, no acute distress  Neck : Supple, no carotid bruit, no jugular venous distention  CVS : Regular rate and rhythm, no murmur, rubs or gallops  Lungs: Clear to auscultation bilaterally, no crackles or rhonchi  Abdomen: Soft, nontender, bowel sounds heard in all 4 quadrants  Extremities: Warm, well-perfused, no pedal edema    Result Review :     The following data was reviewed by: Jose Luis Owens MD on 03/09/2022:    CMP    CMP 7/22/21   Glucose 81   BUN 9   Creatinine 0.59   eGFR Non  Am 106   eGFR African Am 129   Sodium 142   Potassium 4.2   Chloride 104   Calcium 9.7   Total Protein 6.6   Albumin 4.60   Globulin 2.0   Total Bilirubin 0.3   Alkaline Phosphatase 120 (A)   AST (SGOT) 15   ALT (SGPT) 8   (A) Abnormal value       Comments are " available for some flowsheets but are not being displayed.             TSH    TSH 3/22/21 7/22/21   TSH 1.960 1.170           Lipid Panel    Lipid Panel 7/22/21   Total Cholesterol 132   Triglycerides 81   HDL Cholesterol 45   VLDL Cholesterol 16   LDL Cholesterol  71      Comments are available for some flowsheets but are not being displayed.                         Assessment and Plan        Diagnoses and all orders for this visit:    1. Coronary artery disease involving native coronary artery of native heart without angina pectoris (Primary)  Assessment & Plan:  She is currently stable with no angina, completed 4 years since angioplasty.  LV function is preserved.  Continue aspirin and statin.  We will repeat echocardiogram next year to reassess the LV function.    Orders:  -     Lipid Panel; Future    2. Mixed hyperlipidemia  Assessment & Plan:  Labs done in July of last year showed LDL of 71 which is near goal.  Continue atorvastatin 40 mg at bedtime.  We will repeat labs with next lab draw    Orders:  -     atorvastatin (LIPITOR) 40 MG tablet; Take 1 tablet by mouth every night at bedtime.  Dispense: 90 tablet; Refill: 2  -     Lipid Panel; Future            Follow Up     Return in about 9 months (around 12/9/2022) for Next scheduled follow up.    Patient was given instructions and counseling regarding her condition or for health maintenance advice. Please see specific information pulled into the AVS if appropriate.

## 2022-03-24 ENCOUNTER — OFFICE VISIT (OUTPATIENT)
Dept: BEHAVIORAL HEALTH | Facility: CLINIC | Age: 57
End: 2022-03-24

## 2022-03-24 VITALS
WEIGHT: 165.8 LBS | BODY MASS INDEX: 29.38 KG/M2 | DIASTOLIC BLOOD PRESSURE: 60 MMHG | HEIGHT: 63 IN | SYSTOLIC BLOOD PRESSURE: 106 MMHG | HEART RATE: 60 BPM | OXYGEN SATURATION: 96 %

## 2022-03-24 DIAGNOSIS — F33.1 MAJOR DEPRESSIVE DISORDER, RECURRENT EPISODE, MODERATE: Primary | ICD-10-CM

## 2022-03-24 DIAGNOSIS — F41.1 GENERALIZED ANXIETY DISORDER: ICD-10-CM

## 2022-03-24 PROCEDURE — 99215 OFFICE O/P EST HI 40 MIN: CPT | Performed by: NURSE PRACTITIONER

## 2022-03-24 RX ORDER — CLONAZEPAM 0.25 MG/1
0.25 TABLET, ORALLY DISINTEGRATING ORAL NIGHTLY PRN
Qty: 14 TABLET | Refills: 0 | Status: SHIPPED | OUTPATIENT
Start: 2022-03-24 | End: 2022-04-07

## 2022-03-24 NOTE — PROGRESS NOTES
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 and side effects including potential falls, possible impaired driving and metabolic adversities among others. Patient is agreeable to call the office with any worsening of symptoms or onset of side effects. Patient is agreeable to call 911 or go to the nearest ER should he/she begin having SI/HI. No medication side effects or related complaints today.     MEDS ORDERED DURING VISIT:  New Medications Ordered This Visit   Medications   • clonazePAM (KlonoPIN) 0.25 MG disintegrating tablet     Sig: Place 1 tablet on the tongue At Night As Needed for Anxiety for up to 14 days. Indications: Feeling Anxious     Dispense:  14 tablet     Refill:  0     Dose adjustment, decreased to 0.25 mg from 0.5 mg due to increased daytime sedation       Return in about 2 weeks (around 4/7/2022) for Next scheduled follow up.         I spent 42 minutes caring for Chely on this date of service. This time includes time spent by me in the following activities: preparing for the visit, obtaining and/or reviewing a separately obtained history, performing a medically appropriate examination and/or evaluation, counseling and educating the patient/family/caregiver, ordering medications, tests, or procedures, referring and communicating with other health care professionals and documenting information in the medical record.      This document has been  "electronically signed by LOLITA Huynh  2022 11:45 EDT      Part of this note may be an electronic transcription/translation of spoken language to printed text using the Dragon Dictation System.  [facility]  Patient Safety Plan       Patient Name: Chely Peoples       YOB: 1965    Step 1: Warning Signs (thoughts, images, mood, situation, behavior) that a crisis may be developin. Thoughts \" I would be better off dead\" or \"If I did this, then I would be better off\"; Talking about wanting to die or to kill themselves, talking about feeling hopeless or having no reason to live, talking about feeling trapped or in unbearable pain, talking about being a burden to others, increasing the use of alcohol or drugs, acting anxious or agitated; behaving recklessly, sleeping too little or too much, withdrawing or isolating themselves, extreme mood swings.  2.  Behaviors, isolating, more arguments  3.  Worsening mood, sadness, fatigue    Step 2: Internal coping strategies-things I can do to take my mind off my problems without contacting another person (relaxation technique, physical activity):     1. mindfulness (deep breathing, progressive muscle relaxation, imagery, grounding & meditation)   2. cognitive behavioral strategies (reviewing cognitive distortions, negative self-talk/thought stopping and cognitive restructuring, through de-catastrophizing and examining options/alternatives)   3. Walk/hike, listen to music engage in a hobby, exercise, crafts, read a book, journal.     Step 3: People and social settings that provide distraction:     1. Name: Gypsy Guerrero  Phone: [phone]  2. Name: Steffi Boswell    Phone: [phone]  3. Place: unable to identify a place    Step 4: People who I can ask for help:     1.   Name: Gypsy Guerrero  Phone: [phone]  2.   Name: Steffi Boswell    Phone: [phone]                          Step 5: Professionals or " agencies I can contact during a crisis:    1. Clinician Name:  LOLITA Huynh     Phone: [phone] or [phone]        Clinician Pager or Emergency Contact #     2.   Clinician Name: PCP or therapist      Phone:        Clinician Pager or Emergency Contact #     3. Local Emergency Care Services: [facility] Department      Emergency Care Services Address: [address] [place] [address]      Emergency Care Services Phones: [phone]    4. 24/7 Suicide Prevention Lifeline Phone: [phone]    5. 24/7 Crisis Text Line Counseling: Text 'HOME' to 338890    Making the Environment Safe:     1. Pills - give to pharmacist or friend for disposal, do not keep medications you no longer are prescribed      The one thing that is most important to me and worth living for is: My family, salvation, my children.      (From BHARATHI Brown & LAINA Forman. 2011). Safety Planning Intervention: A brief intervention to mitigate suicide risk. [facility]al [facility]. 19, 256-264    Patient has verbally agreed to Patient Safety Plan listed above.     This document has been electronically signed by LOLITA Huynh  March 24, 2022 11:45 EDT

## 2022-03-24 NOTE — PATIENT INSTRUCTIONS
1.  Please return to clinic at your next scheduled visit.  Contact the clinic ([phone]) at least 24 hours prior in the event you need to cancel.  2.  Do no harm to yourself or others.    3.  Avoid alcohol and drugs.    4.  Take all medications as prescribed.  Please contact the clinic with any concerns. If you are in need of medication refills, please call the clinic at [phone].    5. Should you want to get in touch with your provider, LOLITA Huynh, please utilize [email or web address] or contact the office ([phone]), and staff will be able to page the provider on call directly.  6.  In the event you have personal crisis, contact the following crisis numbers: Suicide Prevention Hotline 1-456.178.9867; [facility] Helpline 8-413-535-WSAS; [facility] Emergency Room [phone]; text HELLO to 695596; or 082.     SPECIFIC RECOMMENDATIONS:     1.      Medications discussed at this encounter:                   - Vraylar 1.5 mg nightly  Celexa 40 mg nightly  Decrease Klonopin to 0.25 mg oral distingrating tablet nightly as needed     2.      Psychotherapy recommendations: n/a     3.     Return to clinic: 2 weeks

## 2022-03-29 DIAGNOSIS — F33.1 MDD (MAJOR DEPRESSIVE DISORDER), RECURRENT EPISODE, MODERATE: ICD-10-CM

## 2022-03-29 RX ORDER — CARIPRAZINE 1.5 MG/1
CAPSULE, GELATIN COATED ORAL
Qty: 30 CAPSULE | Refills: 0 | Status: SHIPPED | OUTPATIENT
Start: 2022-03-29 | End: 2022-04-11 | Stop reason: SINTOL

## 2022-04-11 ENCOUNTER — OFFICE VISIT (OUTPATIENT)
Dept: FAMILY MEDICINE CLINIC | Age: 57
End: 2022-04-11

## 2022-04-11 ENCOUNTER — OFFICE VISIT (OUTPATIENT)
Dept: BEHAVIORAL HEALTH | Facility: CLINIC | Age: 57
End: 2022-04-11

## 2022-04-11 VITALS
SYSTOLIC BLOOD PRESSURE: 120 MMHG | HEART RATE: 65 BPM | BODY MASS INDEX: 29.77 KG/M2 | WEIGHT: 168 LBS | HEIGHT: 63 IN | DIASTOLIC BLOOD PRESSURE: 62 MMHG | OXYGEN SATURATION: 96 %

## 2022-04-11 VITALS
SYSTOLIC BLOOD PRESSURE: 111 MMHG | HEART RATE: 74 BPM | OXYGEN SATURATION: 95 % | DIASTOLIC BLOOD PRESSURE: 69 MMHG | BODY MASS INDEX: 30.44 KG/M2 | HEIGHT: 63 IN | WEIGHT: 171.8 LBS

## 2022-04-11 DIAGNOSIS — Z13.6 SCREENING FOR CARDIOVASCULAR CONDITION: ICD-10-CM

## 2022-04-11 DIAGNOSIS — F33.1 MAJOR DEPRESSIVE DISORDER, RECURRENT EPISODE, MODERATE: ICD-10-CM

## 2022-04-11 DIAGNOSIS — Z91.51 HISTORY OF SUICIDE ATTEMPT: ICD-10-CM

## 2022-04-11 DIAGNOSIS — F33.9 RECURRENT MAJOR DEPRESSION RESISTANT TO TREATMENT: Primary | ICD-10-CM

## 2022-04-11 DIAGNOSIS — E03.9 HYPOTHYROIDISM, UNSPECIFIED TYPE: Primary | ICD-10-CM

## 2022-04-11 DIAGNOSIS — E53.8 VITAMIN B12 DEFICIENCY: ICD-10-CM

## 2022-04-11 DIAGNOSIS — F41.1 GENERALIZED ANXIETY DISORDER: ICD-10-CM

## 2022-04-11 DIAGNOSIS — F32.A ANXIETY AND DEPRESSION: ICD-10-CM

## 2022-04-11 DIAGNOSIS — E55.9 VITAMIN D DEFICIENCY: ICD-10-CM

## 2022-04-11 DIAGNOSIS — F41.9 ANXIETY AND DEPRESSION: ICD-10-CM

## 2022-04-11 PROCEDURE — 99214 OFFICE O/P EST MOD 30 MIN: CPT | Performed by: NURSE PRACTITIONER

## 2022-04-11 RX ORDER — CLONAZEPAM 0.25 MG/1
0.25 TABLET, ORALLY DISINTEGRATING ORAL NIGHTLY PRN
Qty: 14 TABLET | Refills: 0 | Status: SHIPPED | OUTPATIENT
Start: 2022-04-11 | End: 2022-04-25 | Stop reason: SDUPTHER

## 2022-04-11 RX ORDER — ARIPIPRAZOLE 2 MG/1
2 TABLET ORAL EVERY MORNING
Qty: 14 TABLET | Refills: 0 | Status: SHIPPED | OUTPATIENT
Start: 2022-04-11 | End: 2022-04-25 | Stop reason: SDUPTHER

## 2022-04-11 RX ORDER — CLONAZEPAM 1 MG/1
1 TABLET ORAL 2 TIMES DAILY PRN
COMMUNITY
End: 2022-04-11 | Stop reason: DRUGHIGH

## 2022-04-11 NOTE — PROGRESS NOTES
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 clarify actual dose of Celexa as patient reports taking 2 of the 20 mg nightly.  However, script in computer indicates 40 mg twice daily for a total of 80 mg.  Per pharmacy, medications were transferred over and prior Celexa order of 20 mg take 2 by mouth nightly still remained on list from Nov. 16, 2021 and on 1/13/22 Celexa 40 mg daily was included with the transfer and both were filled, however, per review of patient text messages from [facility] the dose of Celexa was awaiting approval from provider.  Patient admits to only have 20 mg tablets at home and not having picked up the 40 mg of Celexa. Received EKG results as NSR, QTc 439.  UDS results received with positive amphetamine and negative Benzo, awaiting for Clonazepam urine metabolite for confirmation.    Patient screened positive for depression based on a PHQ-9 score of 16 on 1/18/22 . Follow-up recommendations include: Prescribed antidepressant medication treatment and Suicide Risk Assessment performed.           TREATMENT PLAN/GOALS: Continue supportive psychotherapy efforts and medications as indicated. Treatment and medication options discussed during today's visit. Patient acknowledged and verbally consented to continue with current treatment plan and was educated on the importance of compliance with treatment and follow-up appointments.    MEDICATION ISSUES:  MARIAM  reviewed as expected.  Discussed medication options and treatment plan of prescribed medication as well as the risks, benefits, and side effects including potential falls, possible impaired driving and metabolic adversities among others. Patient is agreeable to call the office with any worsening of symptoms or onset of side effects. Patient is agreeable to call 911 or go to the nearest ER should he/she begin having SI/HI. No medication side effects or related complaints today.     MEDS ORDERED DURING VISIT:  New Medications Ordered This Visit   Medications   • ARIPiprazole (ABILIFY) 2 MG tablet     Sig: Take 1 tablet by mouth Every Morning for 14 days. Indications: Major Depressive Disorder, TRD     Dispense:  14 tablet     Refill:  0     Stop Vryalar-increased sedation, unable to tolerate   • clonazePAM (KlonoPIN) 0.25 MG disintegrating tablet     Sig: Place 1 tablet on the tongue At Night As Needed for Anxiety for up to 14 days. Indications: Feeling Anxious     Dispense:  14 tablet     Refill:  0       Return in about 2 weeks (around 4/25/2022) for Next scheduled follow up.         I spent 37 minutes caring for Cehly on this date of service. This time includes time spent by me in the following activities: preparing for the visit, performing a medically appropriate examination and/or evaluation, counseling and educating the patient/family/caregiver, ordering medications, tests, or procedures, referring and communicating with other health care professionals and documenting information in the medical record.      This document has been electronically signed by LOLITA Huynh  April 11, 2022 10:15 EDT      Part of this note may be an electronic transcription/translation of spoken language to printed text using the Dragon Dictation System.  [facility]  Patient Safety Plan       Patient Name: Chely Peoples       YOB: 1965    Step 1: Warning Signs (thoughts, images, mood, situation,  "behavior) that a crisis may be developin. Thoughts \" I would be better off dead\" or \"If I did this, then I would be better off\"; Talking about wanting to die or to kill themselves, talking about feeling hopeless or having no reason to live, talking about feeling trapped or in unbearable pain, talking about being a burden to others, increasing the use of alcohol or drugs, acting anxious or agitated; behaving recklessly, sleeping too little or too much, withdrawing or isolating themselves, extreme mood swings.  2.  Behaviors, isolating, more arguments  3.  Worsening mood, sadness, fatigue    Step 2: Internal coping strategies-things I can do to take my mind off my problems without contacting another person (relaxation technique, physical activity):     1. mindfulness (deep breathing, progressive muscle relaxation, imagery, grounding & meditation)   2. cognitive behavioral strategies (reviewing cognitive distortions, negative self-talk/thought stopping and cognitive restructuring, through de-catastrophizing and examining options/alternatives)   3. Walk/hike, listen to music engage in a hobby, exercise, crafts, read a book, journal.     Step 3: People and social settings that provide distraction:     1. Name: Gypsy Guerrero  Phone: [phone]  2. Name: Steffi Boswell    Phone: [phone]  3. Place: unable to identify a place    Step 4: People who I can ask for help:     1.   Name: Gypsy Guerrero  Phone: [phone]  2.   Name: Steffi Boswell    Phone: [phone]                          Step 5: Professionals or agencies I can contact during a crisis:    1. Clinician Name:  LOLITA Huynh     Phone: [phone] or [phone]        Clinician Pager or Emergency Contact #     2.   Clinician Name: PCP or therapist      Phone:        Clinician Pager or Emergency Contact #     3. Local Emergency Care Services: [facility] Emergency Department      Emergency Care Services " Address: [address] [place] [address]      Emergency Care Services Phones: [phone]    4. 24/7 Suicide Prevention Lifeline Phone: [phone]    5. 24/7 Crisis Text Line Counseling: Text 'HOME' to 519039    Making the Environment Safe:     1. Pills - give to pharmacist or friend for disposal, do not keep medications you no longer are prescribed      The one thing that is most important to me and worth living for is: My family, salvation, my children.      (From BHARATHI Brown & LAINA Forman. 2011). Safety Planning Intervention: A brief intervention to mitigate suicide risk. Cognitive [facility] Behavioral Practice. 19, 256-264    Patient has verbally agreed to Patient Safety Plan listed above.     This document has been electronically signed by LOLITA Huynh  April 11, 2022 10:15 EDT

## 2022-04-11 NOTE — PATIENT INSTRUCTIONS
1.  Please return to clinic at your next scheduled visit.  Contact the clinic ([phone]) at least 24 hours prior in the event you need to cancel.  2.  Do no harm to yourself or others.    3.  Avoid alcohol and drugs.    4.  Take all medications as prescribed.  Please contact the clinic with any concerns. If you are in need of medication refills, please call the clinic at [phone].    5. Should you want to get in touch with your provider, LOLITA Huynh, please utilize [employer] or contact the office ([phone]), and staff will be able to page the provider on call directly.  6.  In the event you have personal crisis, contact the following crisis numbers: Suicide Prevention Hotline 1-285.998.9773; [facility] Helpline 0-381-547-EFZR; [facility] Emergency Room [phone]; text HELLO to 281059; or 285.     SPECIFIC RECOMMENDATIONS:     1.      Medications discussed at this encounter:                   - Stop Vryalar  Start Abilify 2 mg by mouth daily in the morning (if medication is not ready by 12or1 pm today, start tomorrow) IF started tomorrow it is ok to not take Vraylar tonight       2.      Psychotherapy recommendations: n/a     3.     Return to clinic: 2 weeks

## 2022-04-13 ENCOUNTER — LAB (OUTPATIENT)
Dept: LAB | Facility: HOSPITAL | Age: 57
End: 2022-04-13

## 2022-04-13 DIAGNOSIS — Z13.6 SCREENING FOR CARDIOVASCULAR CONDITION: ICD-10-CM

## 2022-04-13 DIAGNOSIS — I25.10 CORONARY ARTERY DISEASE INVOLVING NATIVE CORONARY ARTERY OF NATIVE HEART WITHOUT ANGINA PECTORIS: ICD-10-CM

## 2022-04-13 DIAGNOSIS — E55.9 VITAMIN D DEFICIENCY: ICD-10-CM

## 2022-04-13 DIAGNOSIS — E78.2 MIXED HYPERLIPIDEMIA: ICD-10-CM

## 2022-04-13 DIAGNOSIS — E03.9 HYPOTHYROIDISM, UNSPECIFIED TYPE: ICD-10-CM

## 2022-04-13 DIAGNOSIS — E53.8 VITAMIN B12 DEFICIENCY: ICD-10-CM

## 2022-04-13 LAB
25(OH)D3 SERPL-MCNC: 18.5 NG/ML (ref 30–100)
ALBUMIN SERPL-MCNC: 4.4 G/DL (ref 3.5–5.2)
ALBUMIN/GLOB SERPL: 2.3 G/DL
ALP SERPL-CCNC: 117 U/L (ref 39–117)
ALT SERPL W P-5'-P-CCNC: 11 U/L (ref 1–33)
ANION GAP SERPL CALCULATED.3IONS-SCNC: 10.7 MMOL/L (ref 5–15)
AST SERPL-CCNC: 14 U/L (ref 1–32)
BILIRUB SERPL-MCNC: 0.5 MG/DL (ref 0–1.2)
BUN SERPL-MCNC: 7 MG/DL (ref 6–20)
BUN/CREAT SERPL: 10.9 (ref 7–25)
CALCIUM SPEC-SCNC: 9.3 MG/DL (ref 8.6–10.5)
CHLORIDE SERPL-SCNC: 106 MMOL/L (ref 98–107)
CHOLEST SERPL-MCNC: 125 MG/DL (ref 0–200)
CO2 SERPL-SCNC: 23.3 MMOL/L (ref 22–29)
CREAT SERPL-MCNC: 0.64 MG/DL (ref 0.57–1)
EGFRCR SERPLBLD CKD-EPI 2021: 105.2 ML/MIN/1.73
GLOBULIN UR ELPH-MCNC: 1.9 GM/DL
GLUCOSE SERPL-MCNC: 90 MG/DL (ref 65–99)
HDLC SERPL-MCNC: 43 MG/DL (ref 40–60)
LDLC SERPL CALC-MCNC: 69 MG/DL (ref 0–100)
LDLC/HDLC SERPL: 1.63 {RATIO}
POTASSIUM SERPL-SCNC: 4 MMOL/L (ref 3.5–5.2)
PROT SERPL-MCNC: 6.3 G/DL (ref 6–8.5)
SODIUM SERPL-SCNC: 140 MMOL/L (ref 136–145)
TRIGL SERPL-MCNC: 60 MG/DL (ref 0–150)
TSH SERPL DL<=0.05 MIU/L-ACNC: 0.07 UIU/ML (ref 0.27–4.2)
VIT B12 BLD-MCNC: 507 PG/ML (ref 211–946)
VLDLC SERPL-MCNC: 13 MG/DL (ref 5–40)

## 2022-04-13 PROCEDURE — 82306 VITAMIN D 25 HYDROXY: CPT

## 2022-04-13 PROCEDURE — 36415 COLL VENOUS BLD VENIPUNCTURE: CPT

## 2022-04-13 PROCEDURE — 82607 VITAMIN B-12: CPT

## 2022-04-13 PROCEDURE — 80061 LIPID PANEL: CPT

## 2022-04-13 PROCEDURE — 84443 ASSAY THYROID STIM HORMONE: CPT

## 2022-04-13 PROCEDURE — 80053 COMPREHEN METABOLIC PANEL: CPT

## 2022-04-15 RX ORDER — ERGOCALCIFEROL 1.25 MG/1
50000 CAPSULE ORAL WEEKLY
Qty: 4 CAPSULE | Refills: 1 | Status: SHIPPED | OUTPATIENT
Start: 2022-04-15 | End: 2022-06-04

## 2022-04-15 RX ORDER — LEVOTHYROXINE SODIUM 112 UG/1
112 TABLET ORAL DAILY
Qty: 90 TABLET | Refills: 0 | Status: SHIPPED | OUTPATIENT
Start: 2022-04-15 | End: 2022-04-19

## 2022-04-19 ENCOUNTER — PATIENT MESSAGE (OUTPATIENT)
Dept: FAMILY MEDICINE CLINIC | Age: 57
End: 2022-04-19

## 2022-04-19 DIAGNOSIS — E03.9 HYPOTHYROIDISM, UNSPECIFIED TYPE: Primary | ICD-10-CM

## 2022-04-19 RX ORDER — LEVOTHYROXINE SODIUM 112 MCG
112 TABLET ORAL DAILY
Qty: 90 TABLET | Refills: 1 | Status: SHIPPED | OUTPATIENT
Start: 2022-04-19 | End: 2022-04-25 | Stop reason: ALTCHOICE

## 2022-04-25 ENCOUNTER — TELEPHONE (OUTPATIENT)
Dept: FAMILY MEDICINE CLINIC | Age: 57
End: 2022-04-25

## 2022-04-25 ENCOUNTER — OFFICE VISIT (OUTPATIENT)
Dept: BEHAVIORAL HEALTH | Facility: CLINIC | Age: 57
End: 2022-04-25

## 2022-04-25 VITALS
SYSTOLIC BLOOD PRESSURE: 138 MMHG | DIASTOLIC BLOOD PRESSURE: 68 MMHG | BODY MASS INDEX: 31.36 KG/M2 | HEART RATE: 83 BPM | HEIGHT: 63 IN | OXYGEN SATURATION: 97 % | WEIGHT: 177 LBS

## 2022-04-25 DIAGNOSIS — F33.9 RECURRENT MAJOR DEPRESSION RESISTANT TO TREATMENT: Primary | ICD-10-CM

## 2022-04-25 DIAGNOSIS — E03.9 HYPOTHYROIDISM, UNSPECIFIED TYPE: Primary | ICD-10-CM

## 2022-04-25 DIAGNOSIS — F41.1 GENERALIZED ANXIETY DISORDER: ICD-10-CM

## 2022-04-25 PROCEDURE — 99214 OFFICE O/P EST MOD 30 MIN: CPT | Performed by: NURSE PRACTITIONER

## 2022-04-25 RX ORDER — AMOXICILLIN 875 MG/1
TABLET, COATED ORAL
COMMUNITY
Start: 2022-04-22 | End: 2022-05-23

## 2022-04-25 RX ORDER — CLONAZEPAM 0.25 MG/1
0.25 TABLET, ORALLY DISINTEGRATING ORAL NIGHTLY PRN
Qty: 30 TABLET | Refills: 0 | Status: SHIPPED | OUTPATIENT
Start: 2022-04-25 | End: 2022-05-23 | Stop reason: SDUPTHER

## 2022-04-25 RX ORDER — ARIPIPRAZOLE 2 MG/1
2 TABLET ORAL EVERY MORNING
Qty: 30 TABLET | Refills: 1 | Status: SHIPPED | OUTPATIENT
Start: 2022-04-25 | End: 2022-06-24

## 2022-04-25 RX ORDER — LEVOTHYROXINE SODIUM 112 MCG
112 TABLET ORAL DAILY
Qty: 90 TABLET | Refills: 1 | Status: SHIPPED | OUTPATIENT
Start: 2022-04-25 | End: 2023-03-15 | Stop reason: DRUGHIGH

## 2022-04-25 RX ORDER — CHLORCYCLIZINE HYDROCHLORIDE AND PSEUDOEPHEDRINE HYDROCHLORIDE 25; 60 MG/1; MG/1
TABLET ORAL
COMMUNITY
Start: 2022-04-20 | End: 2022-05-23

## 2022-04-25 RX ORDER — FLUTICASONE PROPIONATE 50 MCG
SPRAY, SUSPENSION (ML) NASAL
COMMUNITY
Start: 2022-04-20 | End: 2022-05-23

## 2022-04-25 NOTE — TELEPHONE ENCOUNTER
Notice from the pharmacy that patient's plan does not cover name brand Synthroid PA or change please call plan at [phone] to selam GRUBER . Patient ID # [id number]

## 2022-04-25 NOTE — PROGRESS NOTES
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 ready for .     Start Abilify 2 mg by mouth daily in the morning to target mood, depression, anxiety, and low energyRisks, benefits, alternatives discussed with patient including increased energy, exacerbation of irritability, akathisia, GI upset, orthostatic hypotension, increased appetite.  After discussion of these risks and benefits, the patient voiced understanding and agreed to proceed.   Instructed to start today if able to  from pharmacy by 12-1 pm, otherwise start in the morning.        3/24/22:   Will decrease Klonopin dose due to excessive daytime fatigue and difficulty awakening.  Continue Vraylar at current dose, may adjust if decrease in Klonopin dose helps decrease daytime sleepiness and if BLE aches subside.  No noted side effects of general aches with Vraylar.  Patient has expressed concern with lack of effectiveness with current and past medications, and difficulty rationalizing why Adderall has been effective in the past for depression.  Patient continues to complaint of excessive day time sleepiness, as while patient was taking Adderall this was not an issue.  Explained to patient that medications are metabolized different for each person and it may be helpful to locate records of past gene sight testing to review those enzymes affecting metabolism as that may be a factor of past failed medications.  Will plan on reviewing further education regarding treatment resistant depression, treatments, Adderall use, and locate PA for Rexulti as it was denied previously.     3/3/22:  Rexulti was not approved by insurance, after prior discussion with patient regarding alternatives, patient was started on Vraylar 1.5 mg 2/25/22, due to excessive sleepiness will change to nightly dosing to start tomorrow 3/5/22. Continue Klonopin as prescribed, though patient only taking nightly and not using as needed dose currently.  Continue Celexa 40 mg by mouth nightly.  Educated patient of further time needed with Vraylar to determine full effectiveness, as patient asking to switch to another medication as patient denies changes in mood.  Encouraged patient to keep in contact with daughter about mood changes, and to notify provider if mood worsens.  Will see patient back in 3 weeks prior to refill need of Vraylar.   Sleep hygiene education sheet given and discussed with patient     2/17/22: Patient very frustrated today due to overall mental health not improving, disgruntled about suggesting to try Trintellix, which was understandable, as patient did not wish to change to another AD that may or may not be effective.  Added Rexulti today, and added one daily as needed dose of Klonopin as patient previously only taking one at night,  will see patient in 2 weeks.  Added once daily as needed dose due to heightened anxiety and inability to sleep in relation to adjusting to changes in medications with last visit and ongoing.       2/3/22:  Received MR from , reviewed and updated past medication trials, total time to review 33 minutes. Patient expresses increased fatigue and difficulty starting day since no longer taking Effexor and Adderall. Will follow up with patient in 2 weeks, if symptoms remain, will present other medication options such as Trintellix, Viibryd, or Abilify.   Continue Celexa 40 mg (taking 2 of the 20 mg tablets  currently) by mouth nightly to target anxiety and depression. Has not picked up 40 mg tablet due to adequate supply of 20 mg.   Continue Clonazepam (Klonopin) 0.5 mg by mouth nightly to target anxiety and insomnia as previously prescribed.  Last dispensed 1/13/22. Instructed patient to contact provider end of next week 2-3 days prior to 2/13/22. Labs: Clonazepam Urine Metabolite results noted as 87    1/18/22:  Will follow up with patient in 2 weeks to determine appropriate medication regimen, and will discuss with Dr. Casanova further.  Plan to taper down Adderall to discontinue.  Klonopin and Adderall were filled by previous provider on 1/13/22 for one month supply. Patient has significant history of OD on prescription medications, therefore, will plan on tapering down Klonopin due to high risk and change to non controlled medications for sleep and anxiety.  Continue Adderall 20 mg by mouth twice daily as previously ordered for Treatment resistant depression.Continue Clonazepam (Klonopin) 0.5 mg by mouth nightly to target anxiety and insomnia as previously prescribed.Johnathan reviewed, UDS ordered, and controlled substance agreement signed & witnessed. Stop taking Effexor XR due to currently prescribed SSRI and only had 5 doses thus far since restart after not having medication for at least 2 weeks. Patient spent an extensive amount of time describing past psychiatric history will further assess symptoms of depression and anxiety with next visit while only taking the above medications. Spoke with [facility] pharmacist to clarify actual dose of Celexa as patient reports taking 2 of the 20 mg nightly.  However, script in computer indicates 40 mg twice daily for a total of 80 mg.  Per pharmacy, medications were transferred over and prior Celexa order of 20 mg take 2 by mouth nightly still remained on list from Nov. 16, 2021 and on 1/13/22 Celexa 40 mg daily was included with the transfer and both were filled, however, per  review of patient text messages from [employer] the dose of Celexa was awaiting approval from provider.  Patient admits to only have 20 mg tablets at home and not having picked up the 40 mg of Celexa. Received EKG results as NSR, QTc 439.  UDS results received with positive amphetamine and negative Benzo, awaiting for Clonazepam urine metabolite for confirmation.    Patient screened positive for depression based on a PHQ-9 score of 16 on 4/25/22 . Follow-up recommendations include: Prescribed antidepressant medication treatment and Suicide Risk Assessment performed.           TREATMENT PLAN/GOALS: Continue supportive psychotherapy efforts and medications as indicated. Treatment and medication options discussed during today's visit. Patient acknowledged and verbally consented to continue with current treatment plan and was educated on the importance of compliance with treatment and follow-up appointments.    MEDICATION ISSUES:  MARIAM reviewed as expected.  Discussed medication options and treatment plan of prescribed medication as well as the risks, benefits, and side effects including potential falls, possible impaired driving and metabolic adversities among others. Patient is agreeable to call the office with any worsening of symptoms or onset of side effects. Patient is agreeable to call 911 or go to the nearest ER should he/she begin having SI/HI. No medication side effects or related complaints today.     MEDS ORDERED DURING VISIT:  New Medications Ordered This Visit   Medications   • ARIPiprazole (ABILIFY) 2 MG tablet     Sig: Take 1 tablet by mouth Every Morning for 60 days. Indications: Major Depressive Disorder, TRD     Dispense:  30 tablet     Refill:  1     Stop Vryalar-increased sedation, unable to tolerate   • clonazePAM (KlonoPIN) 0.25 MG disintegrating tablet     Sig: Place 1 tablet on the tongue At Night As Needed for Anxiety for up to 30 days. Indications: Feeling Anxious     Dispense:  30 tablet      "Refill:  0       Return in about 4 weeks (around 2022) for Next scheduled follow up.         I spent 36 minutes caring for Chely on this date of service. This time includes time spent by me in the following activities: preparing for the visit, reviewing tests, obtaining and/or reviewing a separately obtained history, performing a medically appropriate examination and/or evaluation, counseling and educating the patient/family/caregiver, ordering medications, tests, or procedures, referring and communicating with other health care professionals and documenting information in the medical record.      This document has been electronically signed by LOLITA Huynh  2022 16:25 EDT      Part of this note may be an electronic transcription/translation of spoken language to printed text using the Dragon Dictation System.  [facility]  Patient Safety Plan       Patient Name: Chely Peoples       YOB: 1965    Step 1: Warning Signs (thoughts, images, mood, situation, behavior) that a crisis may be developin. Thoughts \" I would be better off dead\" or \"If I did this, then I would be better off\"; Talking about wanting to die or to kill themselves, talking about feeling hopeless or having no reason to live, talking about feeling trapped or in unbearable pain, talking about being a burden to others, increasing the use of alcohol or drugs, acting anxious or agitated; behaving recklessly, sleeping too little or too much, withdrawing or isolating themselves, extreme mood swings.  2.  Behaviors, isolating, more arguments  3.  Worsening mood, sadness, fatigue    Step 2: Internal coping strategies-things I can do to take my mind off my problems without contacting another person (relaxation technique, physical activity):     1. mindfulness (deep breathing, progressive muscle relaxation, imagery, grounding & meditation)   2. cognitive behavioral strategies (reviewing cognitive " distortions, negative self-talk/thought stopping and cognitive restructuring, through de-catastrophizing and examining options/alternatives)   3. Walk/hike, listen to music engage in a hobby, exercise, crafts, read a book, journal.     Step 3: People and social settings that provide distraction:     1. Name: Gypsy Guerrero  Phone: [phone]  2. Name: Steffi Boswell    Phone: [phone]  3. Place: unable to identify a place    Step 4: People who I can ask for help:     1.   Name: Gypsy Guerrero  Phone: [phone]  2.   Name: Steffi Boswell    Phone: [phone]                          Step 5: Professionals or agencies I can contact during a crisis:    1. Clinician Name:  LOLITA Huynh     Phone: [phone] or [phone]        Clinician Pager or Emergency Contact #     2.   Clinician Name: PCP or therapist      Phone:        Clinician Pager or Emergency Contact #     3. Local Emergency Care Services: [facility] Emergency Department      Emergency Care Services Address: [address] [place] [address]      Emergency Care Services Phones: [phone]    4. 24/7 Suicide Prevention Lifeline Phone: [phone]    5. 24/7 Crisis Text Line Counseling: Text 'HOME' to 634952    Making the Environment Safe:     1. Pills - give to pharmacist or friend for disposal, do not keep medications you no longer are prescribed      The one thing that is most important to me and worth living for is: My family, salvation, my children.      (From BHAR[employer]I [employer], G.K. 2011). Safety Planning Intervention: A brief intervention to mitigate suicide risk. Cognitive [facility] Behavioral [facility]. 19, 256-264    Patient has verbally agreed to Patient Safety Plan listed above.     This document has been electronically signed by LOLITA Huynh  April 25, 2022 16:25 EDT

## 2022-04-25 NOTE — PATIENT INSTRUCTIONS
1.  Please return to clinic at your next scheduled visit.  Contact the clinic ([phone]) at least 24 hours prior in the event you need to cancel.  2.  Do no harm to yourself or others.    3.  Avoid alcohol and drugs.    4.  Take all medications as prescribed.  Please contact the clinic with any concerns. If you are in need of medication refills, please call the clinic at [phone].    5. Should you want to get in touch with your provider, LOLITA Huynh, please utilize [employer] or contact the office ([phone]), and staff will be able to page the provider on call directly.  6.  In the event you have personal crisis, contact the following crisis numbers: Suicide Prevention Hotline 1-203.825.1004; [facility] Helpline 1-090-067-XZYB; [facility] Emergency Room [phone]; text HELLO to 283320; or 934.  7.  Please feel free to contact my Medical Assistant, Sharyn, directly at [phone], please leave a voice mail if you do not get an answer, and she will return your call within 24 hrs.      SPECIFIC RECOMMENDATIONS:     1.      Medications discussed at this encounter:                   - continue all medications, refilled Klonopin and Abilify today     2.      Psychotherapy recommendations: n/a     3.     Return to clinic: 4 weeks

## 2022-05-23 ENCOUNTER — APPOINTMENT (OUTPATIENT)
Dept: LAB | Facility: HOSPITAL | Age: 57
End: 2022-05-23

## 2022-05-23 ENCOUNTER — LAB (OUTPATIENT)
Dept: LAB | Facility: HOSPITAL | Age: 57
End: 2022-05-23

## 2022-05-23 ENCOUNTER — OFFICE VISIT (OUTPATIENT)
Dept: BEHAVIORAL HEALTH | Facility: CLINIC | Age: 57
End: 2022-05-23

## 2022-05-23 VITALS
DIASTOLIC BLOOD PRESSURE: 70 MMHG | SYSTOLIC BLOOD PRESSURE: 122 MMHG | HEART RATE: 73 BPM | BODY MASS INDEX: 30.97 KG/M2 | HEIGHT: 63 IN | OXYGEN SATURATION: 97 % | WEIGHT: 174.8 LBS

## 2022-05-23 DIAGNOSIS — F41.1 GENERALIZED ANXIETY DISORDER: ICD-10-CM

## 2022-05-23 DIAGNOSIS — E03.9 HYPOTHYROIDISM, UNSPECIFIED TYPE: ICD-10-CM

## 2022-05-23 DIAGNOSIS — F33.9 RECURRENT MAJOR DEPRESSION RESISTANT TO TREATMENT: Primary | ICD-10-CM

## 2022-05-23 DIAGNOSIS — R53.82 CHRONIC FATIGUE: ICD-10-CM

## 2022-05-23 DIAGNOSIS — E03.9 HYPOTHYROIDISM, UNSPECIFIED TYPE: Primary | ICD-10-CM

## 2022-05-23 LAB
T4 FREE SERPL-MCNC: 1.43 NG/DL (ref 0.93–1.7)
TSH SERPL DL<=0.05 MIU/L-ACNC: 0.7 UIU/ML (ref 0.27–4.2)

## 2022-05-23 PROCEDURE — 84443 ASSAY THYROID STIM HORMONE: CPT

## 2022-05-23 PROCEDURE — 36415 COLL VENOUS BLD VENIPUNCTURE: CPT

## 2022-05-23 PROCEDURE — 84439 ASSAY OF FREE THYROXINE: CPT

## 2022-05-23 PROCEDURE — 99213 OFFICE O/P EST LOW 20 MIN: CPT | Performed by: NURSE PRACTITIONER

## 2022-05-23 RX ORDER — CLONAZEPAM 0.25 MG/1
0.25 TABLET, ORALLY DISINTEGRATING ORAL NIGHTLY PRN
Qty: 30 TABLET | Refills: 0 | Status: SHIPPED | OUTPATIENT
Start: 2022-05-23 | End: 2022-06-27 | Stop reason: SDUPTHER

## 2022-05-23 NOTE — PROGRESS NOTES
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 hypotension, increased appetite.  After discussion of these risks and benefits, the patient voiced understanding and agreed to proceed.     4/11/22:  Due to long half life of Vraylar patient has been experienced lingering sleepiness without improvement with dose decrease of Klonopin.  Agrees to switch to Abilify due to hopes to improve energy and mood.  Discussed options and prior PA denial of Rexulti, due to risk of sedation with Rexulti patient preferred to start Abilify. Will see patient back in office in 2 weeks.   -Continue Celexa 40 mg by mouth nightly to target anxiety and depression.     Continue Clonazepam (Klonopin) 0.25 mg ODT (only form available) nightly prn to target anxiety and insomnia.    Filled for 14 days, due to excessive day time fatigue, will reevaluate with next visit    Stop Vraylar 1.5 mg due to increased day time fatigue, increased sleep.  Instructed to not take tonight even if Abilify is not ready for .     Start Abilify 2 mg by mouth daily in the morning to target mood, depression, anxiety, and low energyRisks, benefits, alternatives discussed with patient including increased energy, exacerbation of irritability, akathisia, GI upset, orthostatic hypotension, increased appetite.  After discussion of these risks and benefits, the patient voiced understanding and  agreed to proceed.   Instructed to start today if able to  from pharmacy by 12-1 pm, otherwise start in the morning.        3/24/22:   Will decrease Klonopin dose due to excessive daytime fatigue and difficulty awakening.  Continue Vraylar at current dose, may adjust if decrease in Klonopin dose helps decrease daytime sleepiness and if BLE aches subside.  No noted side effects of general aches with Vraylar.  Patient has expressed concern with lack of effectiveness with current and past medications, and difficulty rationalizing why Adderall has been effective in the past for depression.  Patient continues to complaint of excessive day time sleepiness, as while patient was taking Adderall this was not an issue.  Explained to patient that medications are metabolized different for each person and it may be helpful to locate records of past gene sight testing to review those enzymes affecting metabolism as that may be a factor of past failed medications. Will plan on reviewing further education regarding treatment resistant depression, treatments, Adderall use, and locate PA for Rexulti as it was denied previously.     3/3/22:  Rexulti was not approved by insurance, after prior discussion with patient regarding alternatives, patient was started on Vraylar 1.5 mg 2/25/22, due to excessive sleepiness will change to nightly dosing to start tomorrow 3/5/22. Continue Klonopin as prescribed, though patient only taking nightly and not using as needed dose currently.  Continue Celexa 40 mg by mouth nightly.  Educated patient of further time needed with Vraylar to determine full effectiveness, as patient asking to switch to another medication as patient denies changes in mood.  Encouraged patient to keep in contact with daughter about mood changes, and to notify provider if mood worsens.  Will see patient back in 3 weeks prior to refill need of Vraylar.   Sleep hygiene education sheet given and discussed with patient      2/17/22: Patient very frustrated today due to overall mental health not improving, disgruntled about suggesting to try Trintellix, which was understandable, as patient did not wish to change to another AD that may or may not be effective.  Added Rexulti today, and added one daily as needed dose of Klonopin as patient previously only taking one at night,  will see patient in 2 weeks.  Added once daily as needed dose due to heightened anxiety and inability to sleep in relation to adjusting to changes in medications with last visit and ongoing.       2/3/22:  Received MR from , reviewed and updated past medication trials, total time to review 33 minutes. Patient expresses increased fatigue and difficulty starting day since no longer taking Effexor and Adderall. Will follow up with patient in 2 weeks, if symptoms remain, will present other medication options such as Trintellix, Viibryd, or Abilify.   Continue Celexa 40 mg (taking 2 of the 20 mg tablets currently) by mouth nightly to target anxiety and depression. Has not picked up 40 mg tablet due to adequate supply of 20 mg.   Continue Clonazepam (Klonopin) 0.5 mg by mouth nightly to target anxiety and insomnia as previously prescribed.  Last dispensed 1/13/22. Instructed patient to contact provider end of next week 2-3 days prior to 2/13/22. Labs: Clonazepam Urine Metabolite results noted as 87    1/18/22:  Will follow up with patient in 2 weeks to determine appropriate medication regimen, and will discuss with Dr. Casanova further.  Plan to taper down Adderall to discontinue.  Klonopin and Adderall were filled by previous provider on 1/13/22 for one month supply. Patient has significant history of OD on prescription medications, therefore, will plan on tapering down Klonopin due to high risk and change to non controlled medications for sleep and anxiety.  Continue Adderall 20 mg by mouth twice daily as previously ordered for Treatment resistant  depression.Continue Clonazepam (Klonopin) 0.5 mg by mouth nightly to target anxiety and insomnia as previously prescribed.Mariam reviewed, UDS ordered, and controlled substance agreement signed & witnessed. Stop taking Effexor XR due to currently prescribed SSRI and only had 5 doses thus far since restart after not having medication for at least 2 weeks. Patient spent an extensive amount of time describing past psychiatric history will further assess symptoms of depression and anxiety with next visit while only taking the above medications. Spoke with [facility] pharmacist to clarify actual dose of Celexa as patient reports taking 2 of the 20 mg nightly.  However, script in computer indicates 40 mg twice daily for a total of 80 mg.  Per pharmacy, medications were transferred over and prior Celexa order of 20 mg take 2 by mouth nightly still remained on list from Nov. 16, 2021 and on 1/13/22 Celexa 40 mg daily was included with the transfer and both were filled, however, per review of patient text messages from [facility] the dose of Celexa was awaiting approval from provider.  Patient admits to only have 20 mg tablets at home and not having picked up the 40 mg of Celexa. Received EKG results as NSR, QTc 439.  UDS results received with positive amphetamine and negative Benzo, awaiting for Clonazepam urine metabolite for confirmation.    Patient screened positive for depression based on a PHQ-9 score of 16 on 4/25/22 . Follow-up recommendations include: Prescribed antidepressant medication treatment and Suicide Risk Assessment performed.           TREATMENT PLAN/GOALS: Continue supportive psychotherapy efforts and medications as indicated. Treatment and medication options discussed during today's visit. Patient acknowledged and verbally consented to continue with current treatment plan and was educated on the importance of compliance with treatment and follow-up appointments.    MEDICATION ISSUES:  MARIAM reviewed as  "expected.  Discussed medication options and treatment plan of prescribed medication as well as the risks, benefits, and side effects including potential falls, possible impaired driving and metabolic adversities among others. Patient is agreeable to call the office with any worsening of symptoms or onset of side effects. Patient is agreeable to call 911 or go to the nearest ER should he/she begin having SI/HI. No medication side effects or related complaints today.     MEDS ORDERED DURING VISIT:  New Medications Ordered This Visit   Medications   • clonazePAM (KlonoPIN) 0.25 MG disintegrating tablet     Sig: Place 1 tablet on the tongue At Night As Needed for Anxiety for up to 30 days. Indications: Feeling Anxious     Dispense:  30 tablet     Refill:  0       Return in about 5 weeks (around 2022) for Next scheduled follow up.         I spent 27 minutes caring for Chely on this date of service. This time includes time spent by me in the following activities: preparing for the visit, performing a medically appropriate examination and/or evaluation, counseling and educating the patient/family/caregiver, ordering medications, tests, or procedures, referring and communicating with other health care professionals, documenting information in the medical record and care coordination.      This document has been electronically signed by LOLITA Huynh  May 23, 2022 15:01 EDT      Part of this note may be an electronic transcription/translation of spoken language to printed text using the Dragon Dictation System.  [facility]  Patient Safety Plan       Patient Name: Chely Peoples       YOB: 1965    Step 1: Warning Signs (thoughts, images, mood, situation, behavior) that a crisis may be developin. Thoughts \" I would be better off dead\" or \"If I did this, then I would be better off\"; Talking about wanting to die or to kill themselves, talking about feeling hopeless or having no " reason to live, talking about feeling trapped or in unbearable pain, talking about being a burden to others, increasing the use of alcohol or drugs, acting anxious or agitated; behaving recklessly, sleeping too little or too much, withdrawing or isolating themselves, extreme mood swings.  2.  Behaviors, isolating, more arguments  3.  Worsening mood, sadness, fatigue    Step 2: Internal coping strategies-things I can do to take my mind off my problems without contacting another person (relaxation technique, physical activity):     1. mindfulness (deep breathing, progressive muscle relaxation, imagery, grounding & meditation)   2. cognitive behavioral strategies (reviewing cognitive distortions, negative self-talk/thought stopping and cognitive restructuring, through de-catastrophizing and examining options/alternatives)   3. Walk/hike, listen to music engage in a hobby, exercise, crafts, read a book, journal.     Step 3: People and social settings that provide distraction:     1. Name: Gypsy Guerrero  Phone: [phone]  2. Name: Steffi Boswell    Phone: [phone]  3. Place: unable to identify a place    Step 4: People who I can ask for help:     1.   Name: Gypsy Guerrero  Phone: [phone]  2.   Name: Steffi Boswell    Phone: [phone]                          Step 5: Professionals or agencies I can contact during a crisis:    1. Clinician Name:  LOLITA Huynh     Phone: [phone] or [phone]        Clinician Pager or Emergency Contact #     2.   Clinician Name: PCP or therapist      Phone:        Clinician Pager or Emergency Contact #     3. Local Emergency Care Services: [facility]      Emergency Care Services Address: [address] [place] [address]      Emergency Care Services Phones: [phone]    4. 24/7 Suicide Prevention Lifeline Phone: [phone]    5. 24/7 Crisis Text Line Counseling: Text 'HOME' to 336305    Making  the Environment Safe:     1. Pills - give to pharmacist or friend for disposal, do not keep medications you no longer are prescribed      The one thing that is most important to me and worth living for is: My family, salvation, my children.      (From BHARATHI Brown & LAINA Forman. 2011). Safety Planning Intervention: A brief intervention to mitigate suicide risk. [facility]al [facility]. 19, 256-264    Patient has verbally agreed to Patient Safety Plan listed above.     This document has been electronically signed by LOLITA Huynh  May 23, 2022 15:01 EDT

## 2022-05-23 NOTE — PATIENT INSTRUCTIONS
1.  Please return to clinic at your next scheduled visit.  Contact the clinic ([phone]) at least 24 hours prior in the event you need to cancel.  2.  Do no harm to yourself or others.    3.  Avoid alcohol and drugs.    4.  Take all medications as prescribed.  Please contact the clinic with any concerns. If you are in need of medication refills, please call the clinic at [phone].    5. Should you want to get in touch with your provider, LOLITA Huynh, please utilize [employer] or contact the office ([phone]), and staff will be able to page the provider on call directly.  6.  In the event you have personal crisis, contact the following crisis numbers: Suicide Prevention Hotline 1-475.707.4158; [facility] Helpline 2-735-413-EHPP; [facility] Emergency Room [phone]; text HELLO to 116136; or 904.  7.  Please feel free to contact my Medical Assistant, Sharyn, directly at [phone], please leave a voice mail if you do not get an answer, and she will return your call within 24 hrs.      SPECIFIC RECOMMENDATIONS:     1.      Medications discussed at this encounter:                   - continue current medications  Refilled Clonazepam today     2.      Psychotherapy recommendations: n/a     3.     Return to clinic: 5 weeks

## 2022-05-27 DIAGNOSIS — F41.1 GENERALIZED ANXIETY DISORDER: ICD-10-CM

## 2022-06-01 RX ORDER — LEVOTHYROXINE SODIUM 112 UG/1
1 TABLET ORAL DAILY
COMMUNITY
End: 2022-06-27

## 2022-06-01 RX ORDER — CLONAZEPAM 0.25 MG/1
1 TABLET, ORALLY DISINTEGRATING ORAL EVERY 12 HOURS
COMMUNITY
End: 2022-06-27

## 2022-06-01 RX ORDER — AMOXICILLIN 875 MG/1
1 TABLET, COATED ORAL EVERY 12 HOURS
COMMUNITY
End: 2022-06-27

## 2022-06-01 RX ORDER — CLONAZEPAM 0.25 MG/1
TABLET, ORALLY DISINTEGRATING ORAL
Qty: 30 TABLET | OUTPATIENT
Start: 2022-06-01

## 2022-06-01 RX ORDER — FLUTICASONE PROPIONATE 93 UG/1
1 SPRAY, METERED NASAL EVERY 12 HOURS
COMMUNITY
End: 2023-03-15

## 2022-06-01 NOTE — TELEPHONE ENCOUNTER
Med Refill request Refused due to provider ordered #30 for a month supply on 5/23/22. Please review

## 2022-06-27 ENCOUNTER — OFFICE VISIT (OUTPATIENT)
Dept: BEHAVIORAL HEALTH | Facility: CLINIC | Age: 57
End: 2022-06-27

## 2022-06-27 VITALS
SYSTOLIC BLOOD PRESSURE: 122 MMHG | HEIGHT: 63 IN | DIASTOLIC BLOOD PRESSURE: 80 MMHG | HEART RATE: 79 BPM | OXYGEN SATURATION: 96 % | WEIGHT: 175.2 LBS | BODY MASS INDEX: 31.04 KG/M2

## 2022-06-27 DIAGNOSIS — R53.82 CHRONIC FATIGUE: ICD-10-CM

## 2022-06-27 DIAGNOSIS — F33.9 RECURRENT MAJOR DEPRESSION RESISTANT TO TREATMENT: Primary | ICD-10-CM

## 2022-06-27 DIAGNOSIS — F41.1 GENERALIZED ANXIETY DISORDER: ICD-10-CM

## 2022-06-27 PROBLEM — F41.9 ANXIETY: Status: ACTIVE | Noted: 2022-06-27

## 2022-06-27 PROBLEM — H25.9 AGE-RELATED CATARACT OF BOTH EYES: Status: ACTIVE | Noted: 2022-04-28

## 2022-06-27 PROCEDURE — 99215 OFFICE O/P EST HI 40 MIN: CPT | Performed by: NURSE PRACTITIONER

## 2022-06-27 RX ORDER — CITALOPRAM 40 MG/1
40 TABLET ORAL NIGHTLY
COMMUNITY
End: 2022-07-06 | Stop reason: SDUPTHER

## 2022-06-27 RX ORDER — CLONAZEPAM 0.25 MG/1
0.25 TABLET, ORALLY DISINTEGRATING ORAL NIGHTLY PRN
Qty: 30 TABLET | Refills: 0 | Status: SHIPPED | OUTPATIENT
Start: 2022-06-27 | End: 2022-08-01 | Stop reason: SDUPTHER

## 2022-06-27 RX ORDER — VORTIOXETINE 10 MG/1
10 TABLET, FILM COATED ORAL NIGHTLY
Qty: 30 TABLET | Refills: 1 | Status: SHIPPED | OUTPATIENT
Start: 2022-06-27 | End: 2022-07-06

## 2022-06-27 RX ORDER — ARIPIPRAZOLE 2 MG/1
2 TABLET ORAL EVERY MORNING
COMMUNITY
End: 2022-07-01

## 2022-06-27 NOTE — PROGRESS NOTES
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 patient is taking medication.    Continue Clonazepam (Klonopin) 0.25 mg ODT (only form available) nightly prn to target anxiety and insomnia.   Refilled today for 30 days    Continue Abilify 2 mg by mouth daily in the morning to target mood, depression, anxiety, and low energy.    Medication was not on profile today, reentered as patient is taking medication.    5.  Labs: n/a    Patient continues to suffer from lack of motivation, though appears to have worsened based on interview today.  Denies any episodes of mood fluctuations. Will plan to start Trintellix and taper off Celexa once approval from insurance cleared for Trintellix.  Patient instructed to contact MA directly once notified of approval or denial from pharmacy.  Will plan on calling patient to discuss medication changes once Trintellix verified as approved from insurance.  Explained to patient due to interaction with Trintellix and Abilify will plan on decreasing Abilify dose to 1 mg once Trintellix started.  Thyroid levels WDL.       5/23/22:   Secure chat message sent to PCP during visit as patient requested thyroid levels check which was ordered, patient to have labs drawn after visit today.  Patient seems to be experiencing ongoing chronic fatigue which is suspected to be related to thyroid.  Will continue current medications and plan to see back in 5 weeks with hopes of thyroid levels stabilizing to have a fair evaluation of current  psychotropic medications.  Past failed trials due to excessive fatigue may not be completely accurate as underlying thyroid problems exist.   Continue Celexa 40 mg by mouth nightly to target anxiety and depression.     Continue Clonazepam (Klonopin) 0.25 mg ODT (only form available) nightly prn to target anxiety and insomnia.   Refilled today for 30 days    Continue Abilify 2 mg by mouth daily in the morning to target mood, depression, anxiety, and low energy.     4/25/22:   Will continue current medications and allow more time for Abilify to take into full effect.  Patient continues to experience excessive day time fatigue, though patient has not had Synthroid for at least one week and recent labs this past month indicated an increase in dose of Synthroid and started Vitamin D supplement.  Patient admits to feeling very frustrated with waiting for full effectiveness of psychotropic medications which was acknowledged and explained today.    -Continue Celexa 40 mg by mouth nightly to target anxiety and depression.   -Continue Clonazepam (Klonopin) 0.25 mg ODT (only form available) nightly prn to target anxiety and insomnia.   Refilled today for 30 days  -Continue Abilify 2 mg by mouth daily in the morning to target mood, depression, anxiety, and low energy.  Risks, benefits, alternatives discussed with patient including increased energy, exacerbation of irritability, akathisia, GI upset, orthostatic hypotension, increased appetite.  After discussion of these risks and benefits, the patient voiced understanding and agreed to proceed.     4/11/22:  Due to long half life of Vraylar patient has been experienced lingering sleepiness without improvement with dose decrease of Klonopin.  Agrees to switch to Abilify due to hopes to improve energy and mood.  Discussed options and prior PA denial of Rexulti, due to risk of sedation with Rexulti patient preferred to start Abilify. Will see patient back in office in 2 weeks.    -Continue Celexa 40 mg by mouth nightly to target anxiety and depression.     Continue Clonazepam (Klonopin) 0.25 mg ODT (only form available) nightly prn to target anxiety and insomnia.    Filled for 14 days, due to excessive day time fatigue, will reevaluate with next visit    Stop Vraylar 1.5 mg due to increased day time fatigue, increased sleep.  Instructed to not take tonight even if Abilify is not ready for .     Start Abilify 2 mg by mouth daily in the morning to target mood, depression, anxiety, and low energyRisks, benefits, alternatives discussed with patient including increased energy, exacerbation of irritability, akathisia, GI upset, orthostatic hypotension, increased appetite.  After discussion of these risks and benefits, the patient voiced understanding and agreed to proceed.   Instructed to start today if able to  from pharmacy by 12-1 pm, otherwise start in the morning.        3/24/22:   Will decrease Klonopin dose due to excessive daytime fatigue and difficulty awakening.  Continue Vraylar at current dose, may adjust if decrease in Klonopin dose helps decrease daytime sleepiness and if BLE aches subside.  No noted side effects of general aches with Vraylar.  Patient has expressed concern with lack of effectiveness with current and past medications, and difficulty rationalizing why Adderall has been effective in the past for depression.  Patient continues to complaint of excessive day time sleepiness, as while patient was taking Adderall this was not an issue.  Explained to patient that medications are metabolized different for each person and it may be helpful to locate records of past gene sight testing to review those enzymes affecting metabolism as that may be a factor of past failed medications. Will plan on reviewing further education regarding treatment resistant depression, treatments, Adderall use, and locate PA for Rexulti as it was denied previously.     3/3/22:  Rexulti  was not approved by insurance, after prior discussion with patient regarding alternatives, patient was started on Vraylar 1.5 mg 2/25/22, due to excessive sleepiness will change to nightly dosing to start tomorrow 3/5/22. Continue Klonopin as prescribed, though patient only taking nightly and not using as needed dose currently.  Continue Celexa 40 mg by mouth nightly.  Educated patient of further time needed with Vraylar to determine full effectiveness, as patient asking to switch to another medication as patient denies changes in mood.  Encouraged patient to keep in contact with daughter about mood changes, and to notify provider if mood worsens.  Will see patient back in 3 weeks prior to refill need of Vraylar.   Sleep hygiene education sheet given and discussed with patient     2/17/22: Patient very frustrated today due to overall mental health not improving, disgruntled about suggesting to try Trintellix, which was understandable, as patient did not wish to change to another AD that may or may not be effective.  Added Rexulti today, and added one daily as needed dose of Klonopin as patient previously only taking one at night,  will see patient in 2 weeks.  Added once daily as needed dose due to heightened anxiety and inability to sleep in relation to adjusting to changes in medications with last visit and ongoing.       2/3/22:  Received MR from , reviewed and updated past medication trials, total time to review 33 minutes. Patient expresses increased fatigue and difficulty starting day since no longer taking Effexor and Adderall. Will follow up with patient in 2 weeks, if symptoms remain, will present other medication options such as Trintellix, Viibryd, or Abilify.   Continue Celexa 40 mg (taking 2 of the 20 mg tablets currently) by mouth nightly to target anxiety and depression. Has not picked up 40 mg tablet due to adequate supply of 20 mg.   Continue Clonazepam (Klonopin) 0.5 mg by mouth nightly  to target anxiety and insomnia as previously prescribed.  Last dispensed 1/13/22. Instructed patient to contact provider end of next week 2-3 days prior to 2/13/22. Labs: Clonazepam Urine Metabolite results noted as 87    1/18/22:  Will follow up with patient in 2 weeks to determine appropriate medication regimen, and will discuss with Dr. Casanova further.  Plan to taper down Adderall to discontinue.  Klonopin and Adderall were filled by previous provider on 1/13/22 for one month supply. Patient has significant history of OD on prescription medications, therefore, will plan on tapering down Klonopin due to high risk and change to non controlled medications for sleep and anxiety.  Continue Adderall 20 mg by mouth twice daily as previously ordered for Treatment resistant depression.Continue Clonazepam (Klonopin) 0.5 mg by mouth nightly to target anxiety and insomnia as previously prescribed.Johnathan reviewed, UDS ordered, and controlled substance agreement signed & witnessed. Stop taking Effexor XR due to currently prescribed SSRI and only had 5 doses thus far since restart after not having medication for at least 2 weeks. Patient spent an extensive amount of time describing past psychiatric history will further assess symptoms of depression and anxiety with next visit while only taking the above medications. Spoke with [facility] pharmacist to clarify actual dose of Celexa as patient reports taking 2 of the 20 mg nightly.  However, script in computer indicates 40 mg twice daily for a total of 80 mg.  Per pharmacy, medications were transferred over and prior Celexa order of 20 mg take 2 by mouth nightly still remained on list from Nov. 16, 2021 and on 1/13/22 Celexa 40 mg daily was included with the transfer and both were filled, however, per review of patient text messages from [facility] the dose of Celexa was awaiting approval from provider.  Patient admits to only have 20 mg tablets at home and not having picked up the  40 mg of Celexa. Received EKG results as NSR, QTc 439.  UDS results received with positive amphetamine and negative Benzo, awaiting for Clonazepam urine metabolite for confirmation.    Patient screened positive for depression based on a PHQ-9 score of 16 on 4/25/22 . Follow-up recommendations include: Prescribed antidepressant medication treatment and Suicide Risk Assessment performed.           TREATMENT PLAN/GOALS: Continue supportive psychotherapy efforts and medications as indicated. Treatment and medication options discussed during today's visit. Patient acknowledged and verbally consented to continue with current treatment plan and was educated on the importance of compliance with treatment and follow-up appointments.    MEDICATION ISSUES:  MARIAM reviewed as expected.  Discussed medication options and treatment plan of prescribed medication as well as the risks, benefits, and side effects including potential falls, possible impaired driving and metabolic adversities among others. Patient is agreeable to call the office with any worsening of symptoms or onset of side effects. Patient is agreeable to call 911 or go to the nearest ER should he/she begin having SI/HI. No medication side effects or related complaints today.     MEDS ORDERED DURING VISIT:  New Medications Ordered This Visit   Medications   • Vortioxetine HBr (Trintellix) 10 MG tablet     Sig: Take 10 mg by mouth Every Night. Indications: Major Depressive Disorder     Dispense:  30 tablet     Refill:  1     Will plan to taper off Celexa once Trintellix approved   • clonazePAM (KlonoPIN) 0.25 MG disintegrating tablet     Sig: Place 1 tablet on the tongue At Night As Needed for Anxiety. Indications: Feeling Anxious     Dispense:  30 tablet     Refill:  0       Return in about 4 weeks (around 7/25/2022) for Next scheduled follow up.         I spent 43 minutes caring for Chely on this date of service. This time includes time spent by me in the  "following activities: preparing for the visit, reviewing tests, performing a medically appropriate examination and/or evaluation, counseling and educating the patient/family/caregiver, ordering medications, tests, or procedures, referring and communicating with other health care professionals, documenting information in the medical record and care coordination.      This document has been electronically signed by LOLITA Huynh  2022 12:29 EDT      Part of this note may be an electronic transcription/translation of spoken language to printed text using the Dragon Dictation System.  [facility]  Patient Safety Plan       Patient Name: Chely Peoples       YOB: 1965    Step 1: Warning Signs (thoughts, images, mood, situation, behavior) that a crisis may be developin. Thoughts \" I would be better off dead\" or \"If I did this, then I would be better off\"; Talking about wanting to die or to kill themselves, talking about feeling hopeless or having no reason to live, talking about feeling trapped or in unbearable pain, talking about being a burden to others, increasing the use of alcohol or drugs, acting anxious or agitated; behaving recklessly, sleeping too little or too much, withdrawing or isolating themselves, extreme mood swings.  2.  Behaviors, isolating, more arguments  3.  Worsening mood, sadness, fatigue    Step 2: Internal coping strategies-things I can do to take my mind off my problems without contacting another person (relaxation technique, physical activity):     1. mindfulness (deep breathing, progressive muscle relaxation, imagery, grounding & meditation)   2. cognitive behavioral strategies (reviewing cognitive distortions, negative self-talk/thought stopping and cognitive restructuring, through de-catastrophizing and examining options/alternatives)   3. Walk/hike, listen to music engage in a hobby, exercise, crafts, read a book, journal.     Step 3: People " and social settings that provide distraction:     1. Name: Gypsy Guerrero  Phone: [phone]  2. Name: Steffi Boswell    Phone: [phone]  3. Place: unable to identify a place    Step 4: People who I can ask for help:     1.   Name: Gypsy Guerrero  Phone: [phone]  2.   Name: Steffi Boswell    Phone: [phone]                          Step 5: Professionals or agencies I can contact during a crisis:    1. Clinician Name:  LOLITA Huynh     Phone: [phone] or [phone]        Clinician Pager or Emergency Contact #     2.   Clinician Name: PCP or therapist      Phone:        Clinician Pager or Emergency Contact #     3. Local Emergency Care Services: [facility] Emergency Department      Emergency Care Services Address: [address] [place] [address]      Emergency Care Services Phones: [phone]    4. 24/7 Suicide Prevention Lifeline Phone: [phone]    5. 24/7 Crisis Text Line Counseling: Text 'HOME' to 158314    Making the Environment Safe:     1. Pills - give to pharmacist or friend for disposal, do not keep medications you no longer are prescribed      The one thing that is most important to me and worth living for is: My family, salvation, my children.      (From BHARATHI Brown & LAINA Forman. 2011). Safety Planning Intervention: A brief intervention to mitigate suicide risk. Cognitive [facility]al [facility]. 19, 256-264    Patient has verbally agreed to Patient Safety Plan listed above.     This document has been electronically signed by LOLITA Huynh  June 27, 2022 12:29 EDT

## 2022-06-27 NOTE — PATIENT INSTRUCTIONS
1.  Please return to clinic at your next scheduled visit.  Contact the clinic ([phone]) at least 24 hours prior in the event you need to cancel.  2.  Do no harm to yourself or others.    3.  Avoid alcohol and drugs.    4.  Take all medications as prescribed.  Please contact the clinic with any concerns. If you are in need of medication refills, please call the clinic at [phone].    5. Should you want to get in touch with your provider, LOLITA Huynh, please utilize inmobly or contact the office ([phone]), and staff will be able to page the provider on call directly.  6.  In the event you have personal crisis, contact the following crisis numbers: Suicide Prevention Hotline 1-473.943.3721; [facility] Helpline 5-664-589-UPTQ; [facility] Emergency Room [phone]; text HELLO to 604274; or 218.  7.  Please feel free to contact my Medical Assistant, Sharyn, directly at [phone], please leave a voice mail if you do not get an answer, and she will return your call within 24 hrs.      SPECIFIC RECOMMENDATIONS:     1.      Medications discussed at this encounter:                   - Continue all medications  Will plan on switching Celexa to Trintellix  Once you are notified by Pharmacy of Trintellix approval please let me known so I can call to discuss taper of Celexa. Will also need to decrease Abilify dose by half due to Trintellix and Abilfy are both metabolized by same receptors which could increase the dose of Abilify .       2.      Psychotherapy recommendations: Declined     3.     Return to clinic: 4 weeks    Please arrive at least 10 minutes before your scheduled appointment time to complete check in process.

## 2022-06-30 ENCOUNTER — TELEPHONE (OUTPATIENT)
Dept: BEHAVIORAL HEALTH | Facility: CLINIC | Age: 57
End: 2022-06-30

## 2022-06-30 NOTE — TELEPHONE ENCOUNTER
Called patient to discuss other option for antidepressant treatment due to Trintellix denial from insurance company.  Patient did not answer, left voice mail informing patient of suggestion to taper down off Celexa and restarting Effexor XR again due to ongoing complaint of low energy and fatigue. Instructed patient to contact MA directly today by 4:15 pm as MA will be leaving at that time, otherwise patient can contact the [facility] office If needed, and informed patient both provider and MA will be out of the office thru Tues. 7/5/22 and patient could return call next Wed. 7/6/22 if decision to switch was decided at that time.

## 2022-07-01 RX ORDER — ARIPIPRAZOLE 2 MG/1
TABLET ORAL
Qty: 30 TABLET | Refills: 2 | Status: SHIPPED | OUTPATIENT
Start: 2022-07-01 | End: 2023-03-15

## 2022-07-01 NOTE — TELEPHONE ENCOUNTER
Medication refill request for Abilify 2mg. Called pharmacy to verify last time this medication was picked up, which pharmacy informed last pickup on 06/05/2022. Pt has upcoming appt on 07/25/2022. Medication order pended.

## 2022-07-06 RX ORDER — CITALOPRAM 40 MG/1
40 TABLET ORAL NIGHTLY
Qty: 30 TABLET | Refills: 0 | Status: SHIPPED | OUTPATIENT
Start: 2022-07-06 | End: 2022-07-11 | Stop reason: DRUGHIGH

## 2022-07-06 NOTE — TELEPHONE ENCOUNTER
Med Refill Request Patient has appt scheduled for 07/25/2022.  Med pended   Leg Length Discrp [] [] []    Slumped Sitting [] [] []    Palpation [] [] []    Sensation [] [] []    Edema [] [] []    Neurological [] [] []        Somatic Dysfunctions Normal Deficit Details   Cervical   [] []    Thoracic   [] [x] FRSL T4   Rib   [] [x] L 4-6th rib post   Pelvis   [] []    Lumbar [] []    SI   [] []      Flexibility Normal LUE Deficit RUE Deficit   UTrap [] [x] []   L. Scap [] [x] []   Scalenes  [] [x] []   Pec Major [] [x] []   Pec Minor [] [x] []   Lats [] [] []   Supinators [] [] []   Pronators [] [] []   Other:                            FUNCTION Normal Difficult Unable   Sitting [] [] []   Standing [] [] []   Ambulation [] [] []   Groom/Dress [] [] []   Lift/Carry [] [] []   Stairs [] [] []   Bending [] [x] []   OH reach [] [x] []   Sit to Stand [] [] []         Assessment:     STG: (to be met in 10 treatments)  1. ? Pain: Pt to decrease pain levels to 2/10  2. ? ROM: Increase flexibility throughout to ease ADL progression  3. ? Strength: Increase mobility and strength to 5/5 MMT  4. Independent with Home Exercise Programs    LTG: (to be met in 20 treatments)  1. Improve score of functional assessment tool QUICK DASH to less than 15% impairment   2. Decrease pain to 0/10    Patient goals: decrease shoulder pain     Rehab Potential:  [x] Good  [] Fair  [] Poor   Suggested Professional Referral:  [x] No  [] Yes:  Barriers to Goal Achievement[de-identified]  [x] No  [] Yes:  Domestic Concerns:  [x] No  [] Yes:    Pt. Education:  [x] Plans/Goals, Risks/Benefits discussed  [x] Home exercise program  Method of Education: [x] Verbal  [x] Demo  [] Written  Comprehension of Education:  [x] Verbalizes understanding. [x] Demonstrates understanding. [x] Needs Review. [] Demonstrates/verbalizes understanding of HEP/Ed previously given.     Treatment Plan:  [x] Therapeutic Exercise    [] Modalities:  [x] Therapeutic Activity     [] Ultrasound  [] Electrical Stimulation  [] Gait Training     []Massage       []

## 2022-07-11 DIAGNOSIS — F33.1 MAJOR DEPRESSIVE DISORDER, RECURRENT EPISODE, MODERATE: Primary | ICD-10-CM

## 2022-07-11 DIAGNOSIS — F41.1 GENERALIZED ANXIETY DISORDER: ICD-10-CM

## 2022-07-11 RX ORDER — CITALOPRAM 20 MG/1
20 TABLET ORAL NIGHTLY
Qty: 7 TABLET | Refills: 0 | Status: SHIPPED | OUTPATIENT
Start: 2022-07-11 | End: 2022-08-09 | Stop reason: ALTCHOICE

## 2022-07-11 RX ORDER — VENLAFAXINE HYDROCHLORIDE 37.5 MG/1
CAPSULE, EXTENDED RELEASE ORAL
Qty: 67 CAPSULE | Refills: 0 | Status: SHIPPED | OUTPATIENT
Start: 2022-07-18 | End: 2022-08-09 | Stop reason: SDUPTHER

## 2022-07-19 ENCOUNTER — TELEPHONE (OUTPATIENT)
Dept: CARDIOLOGY | Facility: CLINIC | Age: 57
End: 2022-07-19

## 2022-07-19 NOTE — TELEPHONE ENCOUNTER
Procedure: Tooth Extractions    Med Directive: N/A    PMH: CAD s/p PCI 2018; HLD; Hypothyroidism; Neg for DM    Last Seen: 03/09/2022

## 2022-07-21 NOTE — TELEPHONE ENCOUNTER
She may proceed with tooth extraction, no further preoperative cardiac work-up needed.  Moderate risk for perioperative cardiac events      Electronically signed by Jose Luis Owens MD, 07/21/22, 10:36 AM EDT.

## 2022-07-31 DIAGNOSIS — F41.1 GENERALIZED ANXIETY DISORDER: ICD-10-CM

## 2022-08-01 RX ORDER — CLONAZEPAM 0.25 MG/1
TABLET, ORALLY DISINTEGRATING ORAL
Qty: 30 TABLET | Refills: 0 | Status: SHIPPED | OUTPATIENT
Start: 2022-08-01 | End: 2022-08-29 | Stop reason: SDUPTHER

## 2022-08-01 RX ORDER — IBUPROFEN 600 MG/1
TABLET ORAL
COMMUNITY
Start: 2022-07-12 | End: 2023-03-15

## 2022-08-01 RX ORDER — AMOXICILLIN 500 MG/1
CAPSULE ORAL
COMMUNITY
Start: 2022-07-12 | End: 2023-03-15

## 2022-08-01 RX ORDER — ONDANSETRON HYDROCHLORIDE 8 MG/1
TABLET, FILM COATED ORAL
COMMUNITY
Start: 2022-07-12 | End: 2023-03-15

## 2022-08-09 ENCOUNTER — OFFICE VISIT (OUTPATIENT)
Dept: BEHAVIORAL HEALTH | Facility: CLINIC | Age: 57
End: 2022-08-09

## 2022-08-09 VITALS
BODY MASS INDEX: 32.39 KG/M2 | OXYGEN SATURATION: 96 % | WEIGHT: 182.8 LBS | HEART RATE: 72 BPM | SYSTOLIC BLOOD PRESSURE: 102 MMHG | HEIGHT: 63 IN | DIASTOLIC BLOOD PRESSURE: 60 MMHG

## 2022-08-09 DIAGNOSIS — G47.10 HYPERSOMNIA: ICD-10-CM

## 2022-08-09 DIAGNOSIS — R53.82 CHRONIC FATIGUE: ICD-10-CM

## 2022-08-09 DIAGNOSIS — F41.1 GENERALIZED ANXIETY DISORDER: ICD-10-CM

## 2022-08-09 DIAGNOSIS — F33.1 MAJOR DEPRESSIVE DISORDER, RECURRENT EPISODE, MODERATE: ICD-10-CM

## 2022-08-09 PROCEDURE — 99215 OFFICE O/P EST HI 40 MIN: CPT | Performed by: NURSE PRACTITIONER

## 2022-08-09 RX ORDER — CITALOPRAM 40 MG/1
40 TABLET ORAL NIGHTLY
COMMUNITY
Start: 2022-08-01 | End: 2022-08-09 | Stop reason: ALTCHOICE

## 2022-08-09 RX ORDER — VENLAFAXINE HYDROCHLORIDE 75 MG/1
75 CAPSULE, EXTENDED RELEASE ORAL EVERY MORNING
Qty: 30 CAPSULE | Refills: 1 | Status: SHIPPED | OUTPATIENT
Start: 2022-08-09

## 2022-08-09 NOTE — PATIENT INSTRUCTIONS
1.  Please return to clinic at your next scheduled visit.  Contact the clinic ([phone]) at least 24 hours prior in the event you need to cancel.  2.  Do no harm to yourself or others.    3.  Avoid alcohol and drugs.    4.  Take all medications as prescribed.  Please contact the clinic with any concerns. If you are in need of medication refills, please call the clinic at [phone].    5. Should you want to get in touch with your provider, LOLITA Huynh, please utilize GOinte[employer] or contact the office ([phone]), and staff will be able to page the provider on call directly.  6.  In the event you have personal crisis, contact the following crisis numbers: Suicide Prevention Hotline 1-451.278.7594; [facility] Helpline 1-258-916-STQQ; [facility] Emergency Room [phone]; text HELLO to 615272; or 213.  7.  Please feel free to contact my Medical Assistant, Sharyn, directly at [phone], please leave a voice mail if you do not get an answer, and she will return your call within 24 hrs.      SPECIFIC RECOMMENDATIONS:     1.      Medications discussed at this encounter:                   - no changes, refilled Effexor XR 75 mg today     2.      Psychotherapy recommendations: Declined     3.     Return to clinic: 5 weeks    I will contact [facility] facility about insurance coverage and let you know what is determined.     Please arrive at least 15 minutes before your scheduled appointment time to complete check in process.

## 2022-08-09 NOTE — PROGRESS NOTES
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 capsule; Refill: 1        Visit Diagnoses:    ICD-10-CM ICD-9-CM   1. Hypersomnia  G47.10 780.54   2. Chronic fatigue  R53.82 780.79   3. Major depressive disorder, recurrent episode, moderate (HCC)  F33.1 296.32   4. Generalized anxiety disorder  F41.1 300.02       PLAN:  1. Safety: Safety Plan initiated 1/18/22, copy given previously  2. Therapy: Declines   3. Risk Assessment: Risk of self-harm acutely is high.  Risk factors include anxiety disorder, mood disorder, history of SA, and recent psychosocial stressors (pandemic). Protective factors include no family history, denies access to guns/weapons, no present SI, no history of self-harm in the past, minimal AODA, healthcare seeking, future orientation, willingness to engage in care.  Risk of self-harm chronically is also high, but could be further elevated in the event of treatment noncompliance and/or AODA.  4. Meds:   Continue Effexor XR 75 mg by mouth daily in the morning to target depression and anxiety.  Risks, benefits, alternatives discussed with patient including anorexia, constipation, dizziness, dry mouth, nausea, nervousness, somnolence, sweating, sexual side effects, headache and insomnia. After discussion of these risks and benefits, the patient voiced understanding and agreed to proceed.     Continue Clonazepam (Klonopin) 0.25 mg ODT (only form available) nightly prn to target anxiety and insomnia.   " Last dispensed  on 8/1/22 #30.    Continue Abilify 2 mg by mouth daily in the morning to target mood, depression, anxiety, and low energy.   Denies correlation of drowsiness with medication.     5.  Labs: n/a  6.  Encouraged daily physical activity to help strengthen endurance.  Eat healthy diet    Patient is tolerating Effexor XR 75 mg which was started approximately 2 weeks ago after 1 week of 37.5 mg.  Patient continues to report excessive day time fatigue and hypersomnia, minimal activity, and low motivation.  Discussed other possible factors such as sleep apnea, which patient reported tests were negative several years ago.  Thyroid levels have since returned to therapeutic levels with increase in medications.  Patient has noted some improvement with mood since Effexor, non-verbally presents with improvement.  Patient agreeable to try TMS if insurance will cover 100%, informed patient provider would reach out to TMS site in [place] to inquire about coverage, however, a referral may be required before determination can be known. Will follow up with patient later this week.  Patient did state at the end of the visit, \"So what am I supposed to do for the next several weeks, just be tired.\" Patient continues to bring up past use of Adderall for TRD which has been discussed in the past with patient after discussion with .       6/30/22: TELEPHONE  Patient informed she is to start taking 20 mg of Celexa nightly starting tonight for 7 nights, On Monday 7/18/22 she will start Effexor XR 37.5 mg by mouth daily in the morning for 7 days, on day 8 increase to 75 mg which will be 2 of the 37.5 mg caps. If she has any questions to please call and let us know. She can be rescheduled from 7/25/22 to 4 weeks from today.     Called patient to discuss other option for antidepressant treatment due to Trintellix denial from insurance company.  Patient did not answer, left voice mail informing patient of suggestion to " taper down off Celexa and restarting Effexor XR again due to ongoing complaint of low energy and fatigue. Instructed patient to contact MA directly today by 4:15 pm as MA will be leaving at that time, otherwise patient can contact the [place] office If needed, and informed patient both provider and MA will be out of the office thru Tues. 7/5/22 and patient could return call next Wed. 7/6/22 if decision to switch was decided at that time.     6/27/22:   Continue Celexa 40 mg by mouth nightly to target anxiety and depression.  Medication was not on profile today, reentered as patient is taking medication.    Continue Clonazepam (Klonopin) 0.25 mg ODT (only form available) nightly prn to target anxiety and insomnia.   Refilled today for 30 days    Continue Abilify 2 mg by mouth daily in the morning to target mood, depression, anxiety, and low energy.    Medication was not on profile today, reentered as patient is taking medication.      Patient continues to suffer from lack of motivation, though appears to have worsened based on interview today.  Denies any episodes of mood fluctuations. Will plan to start Trintellix and taper off Celexa once approval from insurance cleared for Trintellix.  Patient instructed to contact MA directly once notified of approval or denial from pharmacy.  Will plan on calling patient to discuss medication changes once Trintellix verified as approved from insurance.  Explained to patient due to interaction with Trintellix and Abilify will plan on decreasing Abilify dose to 1 mg once Trintellix started.  Thyroid levels WDL.       5/23/22:   Secure chat message sent to PCP during visit as patient requested thyroid levels check which was ordered, patient to have labs drawn after visit today.  Patient seems to be experiencing ongoing chronic fatigue which is suspected to be related to thyroid.  Will continue current medications and plan to see back in 5 weeks with hopes of thyroid levels  stabilizing to have a fair evaluation of current psychotropic medications.  Past failed trials due to excessive fatigue may not be completely accurate as underlying thyroid problems exist.   Continue Celexa 40 mg by mouth nightly to target anxiety and depression.     Continue Clonazepam (Klonopin) 0.25 mg ODT (only form available) nightly prn to target anxiety and insomnia.   Refilled today for 30 days    Continue Abilify 2 mg by mouth daily in the morning to target mood, depression, anxiety, and low energy.     4/25/22:   Will continue current medications and allow more time for Abilify to take into full effect.  Patient continues to experience excessive day time fatigue, though patient has not had Synthroid for at least one week and recent labs this past month indicated an increase in dose of Synthroid and started Vitamin D supplement.  Patient admits to feeling very frustrated with waiting for full effectiveness of psychotropic medications which was acknowledged and explained today.    -Continue Celexa 40 mg by mouth nightly to target anxiety and depression.   -Continue Clonazepam (Klonopin) 0.25 mg ODT (only form available) nightly prn to target anxiety and insomnia.   Refilled today for 30 days  -Continue Abilify 2 mg by mouth daily in the morning to target mood, depression, anxiety, and low energy.  Risks, benefits, alternatives discussed with patient including increased energy, exacerbation of irritability, akathisia, GI upset, orthostatic hypotension, increased appetite.  After discussion of these risks and benefits, the patient voiced understanding and agreed to proceed.     4/11/22:  Due to long half life of Vraylar patient has been experienced lingering sleepiness without improvement with dose decrease of Klonopin.  Agrees to switch to Abilify due to hopes to improve energy and mood.  Discussed options and prior PA denial of Rexulti, due to risk of sedation with Rexulti patient preferred to start Abilify.  Will see patient back in office in 2 weeks.   -Continue Celexa 40 mg by mouth nightly to target anxiety and depression.     Continue Clonazepam (Klonopin) 0.25 mg ODT (only form available) nightly prn to target anxiety and insomnia.    Filled for 14 days, due to excessive day time fatigue, will reevaluate with next visit    Stop Vraylar 1.5 mg due to increased day time fatigue, increased sleep.  Instructed to not take tonight even if Abilify is not ready for .     Start Abilify 2 mg by mouth daily in the morning to target mood, depression, anxiety, and low energyRisks, benefits, alternatives discussed with patient including increased energy, exacerbation of irritability, akathisia, GI upset, orthostatic hypotension, increased appetite.  After discussion of these risks and benefits, the patient voiced understanding and agreed to proceed.   Instructed to start today if able to  from pharmacy by 12-1 pm, otherwise start in the morning.        3/24/22:   Will decrease Klonopin dose due to excessive daytime fatigue and difficulty awakening.  Continue Vraylar at current dose, may adjust if decrease in Klonopin dose helps decrease daytime sleepiness and if BLE aches subside.  No noted side effects of general aches with Vraylar.  Patient has expressed concern with lack of effectiveness with current and past medications, and difficulty rationalizing why Adderall has been effective in the past for depression.  Patient continues to complaint of excessive day time sleepiness, as while patient was taking Adderall this was not an issue.  Explained to patient that medications are metabolized different for each person and it may be helpful to locate records of past gene sight testing to review those enzymes affecting metabolism as that may be a factor of past failed medications. Will plan on reviewing further education regarding treatment resistant depression, treatments, Adderall use, and locate PA for Rexulti as it  was denied previously.     3/3/22:  Rexulti was not approved by insurance, after prior discussion with patient regarding alternatives, patient was started on Vraylar 1.5 mg 2/25/22, due to excessive sleepiness will change to nightly dosing to start tomorrow 3/5/22. Continue Klonopin as prescribed, though patient only taking nightly and not using as needed dose currently.  Continue Celexa 40 mg by mouth nightly.  Educated patient of further time needed with Vraylar to determine full effectiveness, as patient asking to switch to another medication as patient denies changes in mood.  Encouraged patient to keep in contact with daughter about mood changes, and to notify provider if mood worsens.  Will see patient back in 3 weeks prior to refill need of Vraylar.   Sleep hygiene education sheet given and discussed with patient     2/17/22: Patient very frustrated today due to overall mental health not improving, disgruntled about suggesting to try Trintellix, which was understandable, as patient did not wish to change to another AD that may or may not be effective.  Added Rexulti today, and added one daily as needed dose of Klonopin as patient previously only taking one at night,  will see patient in 2 weeks.  Added once daily as needed dose due to heightened anxiety and inability to sleep in relation to adjusting to changes in medications with last visit and ongoing.       2/3/22:  Received MR from , reviewed and updated past medication trials, total time to review 33 minutes. Patient expresses increased fatigue and difficulty starting day since no longer taking Effexor and Adderall. Will follow up with patient in 2 weeks, if symptoms remain, will present other medication options such as Trintellix, Viibryd, or Abilify.   Continue Celexa 40 mg (taking 2 of the 20 mg tablets currently) by mouth nightly to target anxiety and depression. Has not picked up 40 mg tablet due to adequate supply of 20 mg.   Continue  Clonazepam (Klonopin) 0.5 mg by mouth nightly to target anxiety and insomnia as previously prescribed.  Last dispensed 1/13/22. Instructed patient to contact provider end of next week 2-3 days prior to 2/13/22. Labs: Clonazepam Urine Metabolite results noted as 87    1/18/22:  Will follow up with patient in 2 weeks to determine appropriate medication regimen, and will discuss with Dr. Casanova further.  Plan to taper down Adderall to discontinue.  Klonopin and Adderall were filled by previous provider on 1/13/22 for one month supply. Patient has significant history of OD on prescription medications, therefore, will plan on tapering down Klonopin due to high risk and change to non controlled medications for sleep and anxiety.  Continue Adderall 20 mg by mouth twice daily as previously ordered for Treatment resistant depression.Continue Clonazepam (Klonopin) 0.5 mg by mouth nightly to target anxiety and insomnia as previously prescribed.Johnathan reviewed, UDS ordered, and controlled substance agreement signed & witnessed. Stop taking Effexor XR due to currently prescribed SSRI and only had 5 doses thus far since restart after not having medication for at least 2 weeks. Patient spent an extensive amount of time describing past psychiatric history will further assess symptoms of depression and anxiety with next visit while only taking the above medications. Spoke with [email or web address] pharmacist to clarify actual dose of Celexa as patient reports taking 2 of the 20 mg nightly.  However, script in computer indicates 40 mg twice daily for a total of 80 mg.  Per pharmacy, medications were transferred over and prior Celexa order of 20 mg take 2 by mouth nightly still remained on list from Nov. 16, 2021 and on 1/13/22 Celexa 40 mg daily was included with the transfer and both were filled, however, per review of patient text messages from [email or web address] the dose of Celexa was awaiting approval from provider.  Patient admits to only have 20 mg  tablets at home and not having picked up the 40 mg of Celexa. Received EKG results as NSR, QTc 439.  UDS results received with positive amphetamine and negative Benzo, awaiting for Clonazepam urine metabolite for confirmation.      Patient screened positive for depression based on a PHQ-9 score of 17 on 8/9/2022. Follow-up recommendations include: Prescribed antidepressant medication treatment and Suicide Risk Assessment performed.        TREATMENT PLAN/GOALS: Continue supportive psychotherapy efforts and medications as indicated. Treatment and medication options discussed during today's visit. Patient acknowledged and verbally consented to continue with current treatment plan and was educated on the importance of compliance with treatment and follow-up appointments.    MEDICATION ISSUES:  MARIAM reviewed as expected.  Discussed medication options and treatment plan of prescribed medication as well as the risks, benefits, and side effects including potential falls, possible impaired driving and metabolic adversities among others. Patient is agreeable to call the office with any worsening of symptoms or onset of side effects. Patient is agreeable to call 911 or go to the nearest ER should he/she begin having SI/HI. No medication side effects or related complaints today.     MEDS ORDERED DURING VISIT:  New Medications Ordered This Visit   Medications   • venlafaxine XR (Effexor XR) 75 MG 24 hr capsule     Sig: Take 1 capsule by mouth Every Morning. Indications: Generalized Anxiety Disorder, Major Depressive Disorder     Dispense:  30 capsule     Refill:  1       Return in about 5 weeks (around 9/13/2022) for Next scheduled follow up.         I spent 44 minutes caring for Chely on this date of service. This time includes time spent by me in the following activities: preparing for the visit, reviewing tests, counseling and educating the patient/family/caregiver, ordering medications, tests, or procedures, referring  "and communicating with other health care professionals, documenting information in the medical record and care coordination.      This document has been electronically signed by LOLITA Huynh  2022 16:20 EDT      Part of this note may be an electronic transcription/translation of spoken language to printed text using the Dragon Dictation System.  [facility]  Patient Safety Plan       Patient Name: Chely Peoples       YOB: 1965    Step 1: Warning Signs (thoughts, images, mood, situation, behavior) that a crisis may be developin. Thoughts \" I would be better off dead\" or \"If I did this, then I would be better off\"; Talking about wanting to die or to kill themselves, talking about feeling hopeless or having no reason to live, talking about feeling trapped or in unbearable pain, talking about being a burden to others, increasing the use of alcohol or drugs, acting anxious or agitated; behaving recklessly, sleeping too little or too much, withdrawing or isolating themselves, extreme mood swings.  2.  Behaviors, isolating, more arguments  3.  Worsening mood, sadness, fatigue    Step 2: Internal coping strategies-things I can do to take my mind off my problems without contacting another person (relaxation technique, physical activity):     1. mindfulness (deep breathing, progressive muscle relaxation, imagery, grounding & meditation)   2. cognitive behavioral strategies (reviewing cognitive distortions, negative self-talk/thought stopping and cognitive restructuring, through de-catastrophizing and examining options/alternatives)   3. Walk/hike, listen to music engage in a hobby, exercise, crafts, read a book, journal.     Step 3: People and social settings that provide distraction:     1. Name: Daughter Kristen Guerrero  Phone: [phone]  2. Name: SisterPedro Boswell    Phone: [phone]  3. Place: unable to identify a place    Step 4: People who I can ask for help:     1.   " Name: Daughter Kristen Guerrero  Phone: [phone]  2.   Name: SisterPedro Boswell    Phone: [phone]                          Step 5: Professionals or agencies I can contact during a crisis:    1. Clinician Name:  LOLITA Huynh     Phone: [phone] or [phone]        Clinician Pager or Emergency Contact #     2.   Clinician Name: PCP or therapist      Phone:        Clinician Pager or Emergency Contact #     3. Local Emergency Care Services: [facility]      Emergency Care Services Address: [address] [place] [address]      Emergency Care Services Phones: [phone]    4. 24/7 Suicide Prevention Lifeline Phone: [phone]    5. 24/7 Crisis Text Line Counseling: Text 'HOME' to 780695    Making the Environment Safe:     1. Pills - give to pharmacist or friend for disposal, do not keep medications you no longer are prescribed      The one thing that is most important to me and worth living for is: My family, salvation, my children.      (From [employer] 2011). Safety Planning Intervention: A brief intervention to mitigate suicide risk. Cognitive and Behavioral Practice. 19, 256-264    Patient has verbally agreed to Patient Safety Plan listed above.     This document has been electronically signed by LOLITA Huynh  August 9, 2022 16:20 EDT

## 2022-08-10 ENCOUNTER — TELEPHONE (OUTPATIENT)
Dept: BEHAVIORAL HEALTH | Facility: CLINIC | Age: 57
End: 2022-08-10

## 2022-08-10 NOTE — TELEPHONE ENCOUNTER
Called [facility] in [place] and spoke with Monica.  Informed of patient request for TMS and patient wanted to know if insurance provider, [facility] would cover TMS. Monica reported [facility] does cover TMS therapy, however, [facility] requires a specific eligibility form for PA, which is required for all insurance companies.     Discussed referral process, TMS staff would contact patient upon receipt of patient information, start eligibility process, and then contact patient to schedule initial consultation with psychiatrist at [facility] once confirmed eligibility and patient agreed with treatment plan which would be further discussed by TMS staff.   Patient would then begin treatment at [facility].     Will forward note to MA, requesting MA contact patient to inform that insurance company does cover TMS therapy and to expect a call from the [place] office this week to discuss further details.

## 2022-08-10 NOTE — TELEPHONE ENCOUNTER
Called patient and [facility] that the therapy she had discussed with Guera yesterday would be contacting her this week, also said Guera had spoken with [facility].

## 2022-08-18 ENCOUNTER — TELEPHONE (OUTPATIENT)
Dept: BEHAVIORAL HEALTH | Facility: CLINIC | Age: 57
End: 2022-08-18

## 2022-08-18 NOTE — TELEPHONE ENCOUNTER
Patient says she spoke with [facility]MS in [place] and they told the patient that they have a chair available at [facility] in [place] for this.  Patient says she called [facility] and they accept her insurance, and they told her that Guera will need to send to [facility] a F33.2 form.

## 2022-08-18 NOTE — TELEPHONE ENCOUNTER
Please contact [facility] for this form, if you are unable to reach them, please have patient contact [facility] and request form to be sent to provider.

## 2022-08-19 NOTE — TELEPHONE ENCOUNTER
Called Marylu to determine what patient is referring to as far as the form needed for TMS.  Per Marylu they will send a referral form to [facility] to my attention.  They said the only DX that is covered for that therapy is the F33.2 or F32.2.

## 2022-08-29 DIAGNOSIS — F41.1 GENERALIZED ANXIETY DISORDER: ICD-10-CM

## 2022-08-29 RX ORDER — CLONAZEPAM 0.25 MG/1
0.25 TABLET, ORALLY DISINTEGRATING ORAL NIGHTLY PRN
Qty: 30 TABLET | Refills: 0 | Status: SHIPPED | OUTPATIENT
Start: 2022-08-29 | End: 2023-03-15

## 2022-08-31 ENCOUNTER — TELEPHONE (OUTPATIENT)
Dept: PSYCHIATRY | Facility: CLINIC | Age: 57
End: 2022-08-31

## 2023-02-24 ENCOUNTER — TELEPHONE (OUTPATIENT)
Dept: FAMILY MEDICINE CLINIC | Age: 58
End: 2023-02-24

## 2023-02-24 NOTE — TELEPHONE ENCOUNTER
Caller: Chely Peoples    Relationship: Self    Best call back number: [phone]    What is the best time to reach you: ANYTIME    Who are you requesting to speak with (clinical staff, provider,  specific staff member): CLINICAL    What was the call regarding: THE PATIENT STATED SHE COMPLETED LABS SEVERAL WEEKS AGO ON 02/09/23 THROUGH [facility] AND HER THYROID NUMBERS WERE OFF. SHE WOULD LIKE TO KNOW IF PCP CAN LOOK OVER THESE RESULTS. SHE WOULD LIKE A CALL BACK TO DISCUSS OR TO SCHEDULE AN VIRTUAL APPOINTMENT IF NEEDED.      Do you require a callback: YES

## 2023-02-24 NOTE — TELEPHONE ENCOUNTER
Spoke w/ pt we do not have those lab results in her chart, advised she could get a copy of those labs and bring them in with her to an appt, pt just wanted to do video visit, we havent seen her in almost a yr inf pt we really need to see her in person

## 2023-03-15 ENCOUNTER — LAB (OUTPATIENT)
Dept: LAB | Facility: HOSPITAL | Age: 58
End: 2023-03-15
Payer: COMMERCIAL

## 2023-03-15 ENCOUNTER — OFFICE VISIT (OUTPATIENT)
Dept: FAMILY MEDICINE CLINIC | Age: 58
End: 2023-03-15
Payer: COMMERCIAL

## 2023-03-15 VITALS
OXYGEN SATURATION: 96 % | HEART RATE: 80 BPM | DIASTOLIC BLOOD PRESSURE: 62 MMHG | WEIGHT: 180 LBS | SYSTOLIC BLOOD PRESSURE: 115 MMHG | TEMPERATURE: 98.3 F | HEIGHT: 63 IN | BODY MASS INDEX: 31.89 KG/M2

## 2023-03-15 DIAGNOSIS — E03.9 HYPOTHYROIDISM, UNSPECIFIED TYPE: Primary | ICD-10-CM

## 2023-03-15 DIAGNOSIS — R74.8 ELEVATED ALKALINE PHOSPHATASE LEVEL: ICD-10-CM

## 2023-03-15 DIAGNOSIS — E55.9 VITAMIN D DEFICIENCY: ICD-10-CM

## 2023-03-15 DIAGNOSIS — E03.9 HYPOTHYROIDISM, UNSPECIFIED TYPE: ICD-10-CM

## 2023-03-15 DIAGNOSIS — Z13.6 SCREENING FOR CARDIOVASCULAR CONDITION: ICD-10-CM

## 2023-03-15 LAB
25(OH)D3 SERPL-MCNC: 30.7 NG/ML (ref 30–100)
ALBUMIN SERPL-MCNC: 4.4 G/DL (ref 3.5–5.2)
ALBUMIN/GLOB SERPL: 1.6 G/DL
ALP SERPL-CCNC: 147 U/L (ref 39–117)
ALT SERPL W P-5'-P-CCNC: 10 U/L (ref 1–33)
ANION GAP SERPL CALCULATED.3IONS-SCNC: 11 MMOL/L (ref 5–15)
AST SERPL-CCNC: 15 U/L (ref 1–32)
BILIRUB SERPL-MCNC: 0.5 MG/DL (ref 0–1.2)
BUN SERPL-MCNC: 13 MG/DL (ref 6–20)
BUN/CREAT SERPL: 16.7 (ref 7–25)
CALCIUM SPEC-SCNC: 9.5 MG/DL (ref 8.6–10.5)
CHLORIDE SERPL-SCNC: 103 MMOL/L (ref 98–107)
CHOLEST SERPL-MCNC: 108 MG/DL (ref 0–200)
CO2 SERPL-SCNC: 27 MMOL/L (ref 22–29)
CREAT SERPL-MCNC: 0.78 MG/DL (ref 0.57–1)
EGFRCR SERPLBLD CKD-EPI 2021: 89.8 ML/MIN/1.73
GLOBULIN UR ELPH-MCNC: 2.7 GM/DL
GLUCOSE SERPL-MCNC: 94 MG/DL (ref 65–99)
HDLC SERPL-MCNC: 34 MG/DL (ref 40–60)
LDLC SERPL CALC-MCNC: 58 MG/DL (ref 0–100)
LDLC/HDLC SERPL: 1.71 {RATIO}
POTASSIUM SERPL-SCNC: 3.7 MMOL/L (ref 3.5–5.2)
PROT SERPL-MCNC: 7.1 G/DL (ref 6–8.5)
SODIUM SERPL-SCNC: 141 MMOL/L (ref 136–145)
TRIGL SERPL-MCNC: 80 MG/DL (ref 0–150)
TSH SERPL DL<=0.05 MIU/L-ACNC: 0.08 UIU/ML (ref 0.27–4.2)
VLDLC SERPL-MCNC: 16 MG/DL (ref 5–40)

## 2023-03-15 PROCEDURE — 84439 ASSAY OF FREE THYROXINE: CPT

## 2023-03-15 PROCEDURE — 36415 COLL VENOUS BLD VENIPUNCTURE: CPT

## 2023-03-15 PROCEDURE — 80053 COMPREHEN METABOLIC PANEL: CPT

## 2023-03-15 PROCEDURE — 80061 LIPID PANEL: CPT

## 2023-03-15 PROCEDURE — 84443 ASSAY THYROID STIM HORMONE: CPT

## 2023-03-15 PROCEDURE — 99214 OFFICE O/P EST MOD 30 MIN: CPT | Performed by: NURSE PRACTITIONER

## 2023-03-15 PROCEDURE — 82306 VITAMIN D 25 HYDROXY: CPT

## 2023-03-15 RX ORDER — LAMOTRIGINE 100 MG/1
100 TABLET ORAL DAILY
COMMUNITY
Start: 2022-11-17

## 2023-03-15 RX ORDER — PROPRANOLOL HYDROCHLORIDE 10 MG/1
10 TABLET ORAL 2 TIMES DAILY PRN
COMMUNITY
Start: 2023-01-12

## 2023-03-15 RX ORDER — TRAZODONE HYDROCHLORIDE 50 MG/1
50 TABLET ORAL NIGHTLY
COMMUNITY
Start: 2022-11-16

## 2023-03-15 RX ORDER — LEVOTHYROXINE SODIUM 100 MCG
100 TABLET ORAL DAILY
Qty: 30 TABLET | Refills: 5 | Status: SHIPPED | OUTPATIENT
Start: 2023-03-15

## 2023-03-15 NOTE — PROGRESS NOTES
Chief Complaint  Chely Peoples presents to [facility] for Hypothyroidism (Follow up)      Subjective     History of Present Illness  Darshana presents for follow up on hypothyroidism. Denies symptoms of thyroid dysfunction at this time.    Reports daily compliance with levothyroxine (Synthroid)112 mcg.  Denies any missed doses.    Last Lab Results       Component                Value               Date                       TSH                      0.696               05/23/2022              She does report that her Therapist checked her TSH stating 0.39 on February 10th          Assessment and Plan       Diagnoses and all orders for this visit:    1. Hypothyroidism, unspecified type (Primary)  Comments:  we will lower her dose, check labs today and have her repeat in 4-6 weeks  Orders:  -     TSH Rfx On Abnormal To Free T4; Future  -     Synthroid 100 MCG tablet; Take 1 tablet by mouth Daily.  Dispense: 30 tablet; Refill: 5    2. Vitamin D deficiency  -     Vitamin D 25 hydroxy; Future    3. Screening for cardiovascular condition  -     Comprehensive metabolic panel; Future  -     Lipid panel; Future        Follow Up   Return for Pending lab results.      New Medications Ordered This Visit   Medications   • Synthroid 100 MCG tablet     Sig: Take 1 tablet by mouth Daily.     Dispense:  30 tablet     Refill:  5       Medications Discontinued During This Encounter   Medication Reason   • amoxicillin (AMOXIL) 500 MG capsule *Therapy completed   • ARIPiprazole (ABILIFY) 2 MG tablet *Therapy completed   • clonazePAM (KlonoPIN) 0.25 MG disintegrating tablet *Therapy completed   • Fluticasone Propionate (Xhance) 93 MCG/ACT Exhaler Suspension *Therapy completed   • ibuprofen (ADVIL,MOTRIN) 600 MG tablet *Therapy completed   • ondansetron (ZOFRAN) 8 MG tablet *Therapy completed   • Synthroid 112 MCG tablet Dose adjustment            Review of Systems    Objective     Vitals:    03/15/23  "1651   BP: 115/62   BP Location: Left arm   Patient Position: Sitting   Cuff Size: Adult   Pulse: 80   Temp: 98.3 °F (36.8 °C)   TempSrc: Oral   SpO2: 96%   Weight: 81.6 kg (180 lb)   Height: 160 cm (63\")     Body mass index is 31.89 kg/m².     Physical Exam  Constitutional:       General: She is not in acute distress.     Appearance: Normal appearance.   HENT:      Head: Normocephalic.   Neck:      Thyroid: No thyroid mass, thyromegaly or thyroid tenderness.   Cardiovascular:      Rate and Rhythm: Normal rate and regular rhythm.   Pulmonary:      Effort: Pulmonary effort is normal.      Breath sounds: Normal breath sounds.   Musculoskeletal:         General: Normal range of motion.   Neurological:      General: No focal deficit present.      Mental Status: She is alert and oriented to person, place, and time.   Psychiatric:         Mood and Affect: Mood normal.         Behavior: Behavior normal.            Result Review                       Allergies   Allergen Reactions   • Diazepam Other (See Comments) and Unknown (See Comments)     Used for procedure and made patient \"crazy\" while teenager   • Tetracycline Hives   • Alprazolam Other (See Comments)     Other reaction(s): Other (See Comments)  Makes hyper   • Tetracyclines & Related Rash      Past Medical History:   Diagnosis Date   • Anxiety    • Anxiety and depression    • BMI 29.0-29.9,adult    • CAD (coronary artery disease)    • COPD (chronic obstructive pulmonary disease) ([facility])    • Depression    • Depression    • History of anemia    • History of suicide attempt    • History of viral gastroenteritis    • Hypothyroidism    • Suicide attempt ([facility])      Current Outpatient Medications   Medication Sig Dispense Refill   • lamoTRIgine (LaMICtal) 100 MG tablet Take 1 tablet by mouth Daily.     • traZODone (DESYREL) 50 MG tablet Take 1 tablet by mouth Every Night.     • aspirin 81 MG EC tablet Take 81 mg by mouth Daily. Whenever she remembers to take it     • " atorvastatin (LIPITOR) 40 MG tablet Take 1 tablet by mouth every night at bedtime. 90 tablet 2   • propranolol (INDERAL) 10 MG tablet Take 1 tablet by mouth 2 (Two) Times a Day As Needed. for anxiety     • Synthroid 100 MCG tablet Take 1 tablet by mouth Daily. 30 tablet 5   • venlafaxine XR (Effexor XR) 75 MG 24 hr capsule Take 1 capsule by mouth Every Morning. Indications: Generalized Anxiety Disorder, Major Depressive Disorder 30 capsule 1     No current facility-administered medications for this visit.     Past Surgical History:   Procedure Laterality Date   • APPENDECTOMY     •  SECTION     • COLONOSCOPY     • CORONARY STENT PLACEMENT     • HYSTERECTOMY      Not due to cancer   • OOPHORECTOMY     • TONSILLECTOMY        Health Maintenance Due   Topic Date Due   • MAMMOGRAM  2014   • ZOSTER VACCINE (1 of 2) Never done   • ANNUAL PHYSICAL  2021   • COVID-19 Vaccine (4 - Booster for Pfizer series) 2022      Immunization History   Administered Date(s) Administered   • COVID-19 (PFIZER) PURPLE CAP 2021, 2021, 2022   • FluLaval/Fluzone >6mos 10/15/2019, 2022   • Flublok 18+yrs 2022   • Hepatitis A 2018   • Pneumococcal Polysaccharide (PPSV23) 2020   • Tdap 2020           EMR Dragon/Transcription disclaimer:  This encounter note may contain some electronic transcription/translation of spoken language to printed text. The electronic translation of spoken language may permit erroneous, or at times, nonsensical words or phrases to be inadvertently transcribed; Although I have reviewed the note for such errors, some may still exist.        Nusrat Malcolm, APRN

## 2023-03-16 LAB — T4 FREE SERPL-MCNC: 1.71 NG/DL (ref 0.93–1.7)

## 2023-04-03 ENCOUNTER — PRIOR AUTHORIZATION (OUTPATIENT)
Dept: FAMILY MEDICINE CLINIC | Age: 58
End: 2023-04-03
Payer: COMMERCIAL

## 2023-04-03 NOTE — TELEPHONE ENCOUNTER
Insurance will only cover brand name if pt has had an adverse event (rash, nausea, vomiting, anaphylaxis) to generic.  Left voicemail for pt to return my call to find out why she requires brand name.

## 2023-04-04 NOTE — TELEPHONE ENCOUNTER
"Pt states that she is unable to take generic because \"it just doesn't work.\" Pt denies any adverse event. I advised her that I will submit to insurance and let her know when we receive a determination.  "

## 2023-04-21 DIAGNOSIS — E03.9 HYPOTHYROIDISM, UNSPECIFIED TYPE: ICD-10-CM

## 2023-04-21 RX ORDER — LEVOTHYROXINE SODIUM 112 MCG
112 TABLET ORAL DAILY
Qty: 90 TABLET | Refills: 1 | OUTPATIENT
Start: 2023-04-21

## 2023-05-02 ENCOUNTER — TELEPHONE (OUTPATIENT)
Dept: FAMILY MEDICINE CLINIC | Age: 58
End: 2023-05-02
Payer: COMMERCIAL

## 2023-05-07 ENCOUNTER — E-VISIT (OUTPATIENT)
Dept: FAMILY MEDICINE CLINIC | Facility: TELEHEALTH | Age: 58
End: 2023-05-07
Payer: COMMERCIAL

## 2023-05-07 PROCEDURE — BRIGHTMDVISIT: Performed by: NURSE PRACTITIONER

## 2023-05-08 NOTE — EXTERNAL PATIENT INSTRUCTIONS
Note   go to ER for any suicidal or homicidal ideations   Diagnosis   Depression   My name is LOLITA Ruelas. I'm a healthcare provider at [facility]. I've reviewed your interview, and I see that you have some common symptoms of depression. I'm glad you reached out.   Depression is the most common mental health condition worldwide. In the [place], about 1 in 5 people will experience clinical depression in their lifetime. Fortunately, effective treatments are available. The sooner you start treatment, the better it works.   Treatment for depression can include counseling, coaching, consultations, antidepressant medications, or various digital tools. In creating your treatment plan, I've considered your symptoms, current situation, medical history, and previous treatments, if any.    Please follow up with your provider in a few weeks. They'll check how you're doing and adjust your treatment plan if necessary.   In addition to your prescribed treatment, there are things you can do to help yourself feel better. Depression can lead you to avoid doing tasks, activities, and being with others. Taking action can help break the cycle of avoidance. Even small actions and lifestyle changes can make a big difference. Try some of the suggestions in the Other treatment section below.   Orders and referrals   I've included a referral for therapy in your treatment plan. Someone will contact you to schedule an appointment for counseling or therapy.   About your diagnosis   Depression is different from ordinary sadness. When you're sad or going through normal grief, the feelings may come and go, and then fade over time. Depression causes long-standing symptoms that affect your ability to go about your daily life.   It's a health condition that affects how you think and function, and can even affect your self-esteem. Sometimes depression can also cause physical symptoms such as headaches and stomach pains.   Common  symptoms of depression include:    Feeling sad, hopeless, discouraged, or down    Loss of interest or pleasure in previously enjoyable activities    Appetite or weight changes    Sleep disturbances: sleeping too much or too little    Either restlessness or sluggishness    Loss of energy    Excessive guilt    Feelings of worthlessness    Difficulty concentrating    Recurrent thoughts of death or suicide   What to expect   Counseling and talk therapy   Counseling or therapy teaches you new coping skills and more adaptive ways of thinking about problems. These tools can help you make positive changes. The benefits of counseling often last long after treatment sessions have stopped.   Many people with mild symptoms of depression don't need medication, and can be treated with counseling, coaching, or other types of care management.   When to seek care   If you feel like harming yourself or others, call 911 right away.   The [employer] Suicide and Crisis Lifeline is also available. You can call [phone]   to speak with a counselor at the lifeline, or you can connect with one using their online chat  .   Call us at [phone]   with any sudden or unexpected symptoms.    Worsening depression symptoms    New or worsening anxiety symptoms    Feeling extremely agitated or restless    Panic attacks    Worsening insomnia    New or worsening irritability    Inappropriate aggression, anger, or violence    Dangerous impulses    Extreme increase in activity or talking    Other unusual changes in behavior, mood, thoughts, or feeling   Other treatment   The tips below may help you feel better while you start your treatment plan:   Self-care    Depression can make self-care hard, but taking action can help you get better. So start where you are and set small goals. These can be simple: get out of bed, take a shower, get dressed, prepare a meal.    Make a list of activities that usually improve your mood. When you're feeling down, try doing  "one of those activities, even for a few minutes.    Be kind to yourself. Don't get down on yourself if you don't reach a goal. Be willing to try again.    Try to eat on a regular schedule. Blood sugar levels can affect mood.   Exercise    Physical exercise has an especially positive effect on mood. If you're able to, try walking 30 minutes a day, 3 to 5 times a week. If that sounds like too much, challenge yourself to start walking for just 10 minutes a day. If walking is not for you, find another activity. Any kind of physical activity helps. The best exercise is the kind you enjoy and will actually do.   Improve your sleep   Getting better sleep is one of the best things you can do to improve your symptoms.    Caffeine, tobacco, and alcohol can cause interrupted sleep. Cutting down or quitting these can improve the quality of your sleep. If you can't quit caffeine completely, try avoiding it later in the day.    Set a regular bedtime, and allow a period of time to \"unwind\" before going to sleep.    Wake up at the same time every day.    Turn off or put away all electronic devices an hour before going to sleep.    Avoid reading, watching TV, or using electronic devices in bed.    As much as possible, keep your bedroom dark, cool, and quiet.    If you're struggling to sleep, don't stay in bed. Get up and go to a quiet spot. Read or do relaxation exercises. Then go back to bed and try again.   Try mindfulness exercises    If your mind races, focus on your body instead. Breathe in slowly through your nose and out through your mouth.    Some people find that meditation helps with mood symptoms. If you want to try meditation but don't know how, mobile apps can get you started.   Use your creativity    When you have depression, you can often spend too much time thinking. Making something with your hands can use your thoughts in a positive way and bring some relief. It also helps you move from inaction to action. " Activities like writing in a journal, gardening, woodworking, cooking, or doing a craft can help focus your mind.   Connect with others    If you can't meet in person, send a short text or email to someone just to keep in touch.    If you use social media, notice how it makes you feel. If certain topics or people have a negative effect on your mood, unfollow them. Limit the time you spend on social media. Active participation can be better than passive scrolling through a feed.    If you're up to it, try volunteering. Or just do something kind for someone. This can lift your mood as well as theirs.   Your provider   Your diagnosis was provided by LOLITA Ruelas, a member of your trusted care team at [facility].   If you have any questions, call us at [phone]  .

## 2023-05-08 NOTE — E-VISIT TREATED
Chief Complaint: Anxiety, Depression, Stress   Patient introduction   Patient is 57-year-old female presenting with mood symptoms. Patient has had current symptoms for more than a year.   Patient-submitted comments explaining reason for visit: I am having memory issues, to the point that it is affecting my work performance..   Patient did not request an excuse note.   Has had recent unusual stress relating to personal relationships, family functioning, work, and finances.   Depression screening   PHQ-9. Response options are: Not at all (0), On several days (1), More than half the days (2), or Nearly every day (3).   Over the past 2 weeks, patient has been bothered:    (3) Nearly every day by having little interest or pleasure in doing things    (3) Nearly every day by depressed mood    (3) Nearly every day by sleep disturbance    (3) Nearly every day by fatigue or lethargy    (3) Nearly every day by change in appetite    (2) On more than half the days by feelings of worthlessness or excessive guilt    (3) Nearly every day by poor concentration    (1) On several days by observable restlessness or slowness in movement    (0) Not at all by thoughts of hurting themselves or that they would be better off dead   The above problems have made it extremely difficult to work, function at home, or get along with other people.   Score: 21. Interpretation: 0 to 4: None to minimal. 5 to 9: Mild depression. 10 to 14: Major Depressive Disorder, Mild. 15 to 19: Major Depressive Disorder, Moderately Severe. 20 to 27: Major Depressive Disorder, Severe.   Anxiety screening   YON-7. Response options are: Not at all (0), On several days (1), More than half the days (2), or Nearly every day (3)   Over the past 2 weeks, patient has been bothered:    (3) Nearly every day by feeling nervous, anxious, or on edge    (1) On several days by not being able to stop or control worrying    (1) On several days by worrying too much about different  things    (2) On more than half the days by having trouble relaxing    (0) Not at all by being so restless that it is hard to sit still    (1) On several days by becoming easily annoyed or irritable    (1) On several days by feeling afraid, as if something awful might happen   The above problems have made it extremely difficult to work, function at home, or get along with other people.   Score: 9. Interpretation: 0 to 4: None to minimal. 5 to 9: Mild anxiety. 10 to 14: Moderate anxiety. 15 to 21: Severe anxiety.   Suicide risk screening   Score: Negative screen (based on PHQ-9 responses above).   Action taken based on risk:    Negative screen: Patient completed interview.    Low risk: Patient completed interview. Follow-up per provider discretion.    Moderate risk: Recommended to call [phone] or [phone] or to go to their nearest ER. Patient given option to continue with the interview if those options are not relevant at this time. Follow-up per provider discretion.    High risk: Interview terminated. Recommended to go to ER or to call [phone] or [phone].   Repetitive thoughts and behaviors screening   DSM-5 Level 1 Cross-Cutting Symptom Measure, Section X. 2 items. Response options are: Not at all (0), Rarely (1), Several days (2), More than half the days (3), or Nearly every day (4)   Over the past 2 weeks, patient has been bothered:    (3) On more than half the days by unpleasant thoughts, urges, or images that repeatedly enter their mind    (0) Not at all by feeling driven to repeat certain behaviors or mental acts   Score: 3. Interpretation: 0 to 2 (with 0 to 1 on both items): Negative screen. 2 or higher (with 2 or higher on either item): Positive screen.   Katelin/hypomania screening   DSM-5 Level 1 Cross-Cutting Symptom Measure, Section III. 2 items. Response options are: Not at all (0), Rarely (1), Several days (2), More than half the days (3), or Nearly every day (4)   Over the past 2 weeks, patient has been bothered:    (0)  Not at all by sleeping less than usual, but still having a lot of energy    (0) Not at all by starting lots more projects than usual or doing more risky things than usual   Score: 0. Interpretation: 0 to 2 (with 1 on both items): Negative screen. 2 or higher (with 2 or higher on at least 1 item): Positive screen; in-interview follow-up with Katerina Self-Rating Katelin (ASRM) Scale.   Psychosis/hallucination screening   DSM-5 Level 1 Cross-Cutting Symptom Measure, Section VII. 2 items. Response options are: Not at all (0), Rarely (1), Several days (2), More than half the days (3), or Nearly every day (4)   Over the past 2 weeks, patient has been bothered:    (0) Not at all by hearing things other people could not hear    (0) Not at all by feeling that someone could hear their thoughts   Score: 0. Interpretation: 0: Negative screen. 1 or higher: Positive screen.   Substance abuse screening   DSM-5 Level 1 Cross-Cutting Symptom Measure, Section XIII. 2 items on use of tobacco, recreational drugs, or prescription medications beyond the amount prescribed or duration of prescription.   Over the past 2 weeks, patient:    (0) Did not use tobacco    (0) Did not use a recreational or prescription drug on their own   Score: 0. Interpretation: 0 is a negative screen. 1 or higher with positive response for prescription/recreational drug abuse leads to follow-up with Level 2 Cross-Cutting Symptom Measure, Section XIII. 1 or higher with positive response for tobacco use leads to tobacco cessation advice in AVS.   Comorbid/Exacerbating conditions   History of thyroid disorder.   Past mental health history   Previous diagnosis of depression. Regarding previous diagnosis of mental health condition(s) not listed, patient writes: Treatment Resistant Depression. Regarding month and year of first depression diagnosis, patient writes: 06/1988. Since initial depression diagnosis, patient has not had a period when symptoms resolved and they  did not need medication.   Family history of mental health disorders   First-degree relative(s) with a history of depression.   Current mental health treatment   Patient is not currently in counseling or therapy. Patient is not currently being seen by a psychiatrist and has not been seen by one in the last 2 years.   Currently taking venlafaxine and trazodone.   As to effectiveness of current treatment:   Patient is satisfied with trazodone. Patient does not want to refill trazodone.   Patient is satisfied with venlafaxine. Patient does not want to refill venlafaxine.   Regarding current medication(s) for mental health condition(s), patient writes: Lamotrigine (yes).   Previous mental health treatment   Has taken citalopram, fluoxetine, bupropion, and sertraline in the past.   As to effectiveness of past treatment:   Patient was not satisfied with bupropion. Patient does not want to refill bupropion.   Patient was not satisfied with citalopram. Patient does not want to refill citalopram.   Patient was not satisfied with fluoxetine. Patient does not want to refill fluoxetine.   Patient was not satisfied with sertraline. Patient does not want to refill sertraline.   Current medications   Currently taking lamoTRIgine 100 MG tablet, traZODone 50 MG tablet, venlafaxine XR 75 MG 24 hr capsule, venlafaxine, Synthroid, and Magnesium.   Medication allergies   No relevant drug allergies.   Medication contraindications   Assessment   Major depressive disorder, single, severe.   This diagnosis is based on review of patient interview responses and other available clinical information.    PHQ-9 depression screening score: 21. Interpretation: 20 to 27: Major Depressive Disorder, Severe.    YON-7 generalized anxiety screening score: 9. Interpretation: 5 to 9: Mild anxiety.   Suicide risk severity screening was negative.   Screening results show low likelihood of yarelis/hypomania and psychosis.   Based on screening tests, the  "following areas warrant further evaluation at follow-up:    Repetitive thoughts and behaviors   Plan   Medications:   No medications prescribed.   Orders:    Referral to behavioral health. Additional note: URGENT - Refer to [facility] \"for medication management\". patient is already on medication and feels like she is having some memory issues.   Education:    Condition and causes    Treatment and self-care    Possible medication side effects    When to call provider   ----------   Electronically signed by LOLITA Ruelas on 2023-05-07 at 20:39PM   ----------   Patient Interview Transcript:   Have you ever been diagnosed with any of these mental health conditions? Select all that apply.    Depression    A mental health condition not listed here (specify): Treatment Resistant Depression   Not selected:    Generalized anxiety disorder (YON)    Panic attacks    Post traumatic stress disorder (PTSD)    Obsessive-compulsive disorder (OCD)    Bipolar disorder    Schizophrenia or schizoaffective disorder    None of the above   When were you first diagnosed with depression? Please specify the month and year, or your best estimate, as MM/YYYY.    06/1988   Since you were first diagnosed with depression, has there been a time when your symptoms went away completely and you didn't need to take medication? Select one.    No   Not selected:    Yes   Are you currently taking medication for any mental health condition? Select one.    Yes   Not selected:    No   Which of these medications are you currently taking for a mental health condition? Select all that apply.    Effexor or Effexor XR (venlafaxine)    Trazodone    Other (specify medication and whether you're satisfied with it): Lamotrigine (yes)   Not selected:    Atarax or Vistaril (hydroxyzine)    BuSpar (buspirone)    Celexa (citalopram)    Cymbalta or Drizalma Sprinkle (duloxetine)    Lexapro (escitalopram)    Paxil, Paxil CR, or " Pexeva (paroxetine)    Pristiq (desvenlafaxine)    Prozac (fluoxetine)    Remeron (mirtazapine)    Wellbutrin SR, Wellbutrin XL, Forfivo XL, or Aplenzin (bupropion)    Zoloft (sertraline)   Have you taken any other medications for this or any other mental health condition in the past? Select one.    Yes   Not selected:    No   Which medications have you taken in the past for your mental health condition(s)? Select all that apply.    Celexa (citalopram)    Prozac (fluoxetine)    Wellbutrin SR, Wellbutrin XL, Forfivo XL, or Aplenzin (bupropion)    Zoloft (sertraline)   Not selected:    Atarax or Vistaril (hydroxyzine)    BuSpar (buspirone)    Cymbalta or Drizalma Sprinkle (duloxetine)    Effexor or Effexor XR (venlafaxine)    Lexapro (escitalopram)    Paxil, Paxil CR, or Pexeva (paroxetine)    Pristiq (desvenlafaxine)    Remeron (mirtazapine)    Trazodone    Other (specify medication and whether you were satisfied with it)   I'm satisfied with Prozac (fluoxetine)    No   Not selected:    Yes   I want to refill/restart    No   Not selected:    Yes   Why were you unsatisfied with Prozac (fluoxetine)? Select all that apply.    None of the above   Not selected:    It doesn't help my symptoms at all    It helps some, but I still have bothersome symptoms    I don't like the side effects    It's too expensive   I'm satisfied with Zoloft (sertraline)    No   Not selected:    Yes   I want to refill/restart    No   Not selected:    Yes   Why were you unsatisfied with Zoloft (sertraline)? Select all that apply.    None of the above   Not selected:    It doesn't help my symptoms at all    It helps some, but I still have bothersome symptoms    I don't like the side effects    It's too expensive   I'm satisfied with Effexor or Effexor XR (venlafaxine)    Yes   Not selected:    No   I want to refill/restart    No   Not selected:    Yes   I'm satisfied with Celexa (citalopram)    No   Not selected:    Yes   I want to refill/restart     No   Not selected:    Yes   Why were you unsatisfied with Celexa (citalopram)? Select all that apply.    None of the above   Not selected:    It doesn't help my symptoms at all    It helps some, but I still have bothersome symptoms    I don't like the side effects    It's too expensive   I'm satisfied with Wellbutrin SR, Wellbutrin XL, Forfivo XL, or Aplenzin    No   Not selected:    Yes   I want to refill/restart    No   Not selected:    Yes   Why were you unsatisfied with Wellbutrin SR, Wellbutrin XL, Forfivo XL, or Aplenzin (bupropion)? Select all that apply.    None of the above   Not selected:    It doesn't help my symptoms at all    It helps some, but I still have bothersome symptoms    I don't like the side effects    It's too expensive   I'm satisfied with trazodone    Yes   Not selected:    No   I want to refill/restart    No   Not selected:    Yes   Have you recently experienced unusual stress from any of these? Select all that apply.    Personal relationships    Family    Work    Finances   Not selected:    Home situation    Something related to COVID-19    Current news and events    None of the above   Are you currently in counseling or therapy? Select one.    No   Not selected:    Yes   Are you currently being seen by a psychiatrist, or have you been seen by a psychiatrist in the last 2 years? Select one.    No   Not selected:    Yes, currently    Yes, within the last 2 years   Do any of these apply to you? Select all that apply.    None of the above   Not selected:    I'm pregnant    I've given birth in the past 12 months    I'm breastfeeding   Do you have any of these medical conditions? Scroll to see all options. Select all that apply.    Thyroid disorder   Not selected:    Asthma    Cancer    Chronic pain    Congestive heart failure    Coronary artery disease (blocked arteries in the heart)    Diabetes    Epilepsy    High blood pressure    Inflammatory arthritis    Kidney disease or history of  kidney function problems    Lupus (SLE)    Multiple sclerosis    Parkinson disease    Viral hepatitis    None of the above   Do any of these apply to your first-degree blood relatives? First-degree blood relatives include parents, siblings, and children who you're related to by birth, not by marriage or adoption. Select all that apply.    Depression   Not selected:    Generalized anxiety disorder (YON)    Panic attacks    Post traumatic stress disorder (PTSD)    Obsessive-compulsive disorder (OCD)    Bipolar disorder    Schizophrenia or schizoaffective disorder    Drug or alcohol addiction (substance use disorder)     by suicide    Attempted suicide    No, not that I know of   Are any of your first-degree blood relatives taking medications for their condition? Select one.    I'm not sure   Not selected:    Yes    No   1. Over the past 2 weeks, how often have you been bothered by: Having little interest or pleasure in doing things Select one.    Nearly every day   Not selected:    Not at all    Several days    More than half the days   2. Over the past 2 weeks, how often have you been bothered by: Feeling down, depressed, or hopeless Select one.    Nearly every day   Not selected:    Not at all    Several days    More than half the days   3. Over the past 2 weeks, how often have you been bothered by: Trouble falling or staying asleep, or sleeping too much Select one.    Nearly every day   Not selected:    Not at all    Several days    More than half the days   4. Over the past 2 weeks, how often have you been bothered by: Feeling tired or having little energy Select one.    Nearly every day   Not selected:    Not at all    Several days    More than half the days   5. Over the past 2 weeks, how often have you been bothered by: Poor appetite or overeating Select one.    Nearly every day   Not selected:    Not at all    Several days    More than half the days   6. Over the past 2 weeks, how often have you been  bothered by: Feeling bad about yourself, that you're a failure, or that you've let yourself or friends and family down Select one.    More than half the days   Not selected:    Not at all    Several days    Nearly every day   7. Over the past 2 weeks, how often have you been bothered by: Trouble concentrating on things like watching TV or reading the news Select one.    Nearly every day   Not selected:    Not at all    Several days    More than half the days   8. Over the past 2 weeks, how often have you been bothered by: Moving or speaking so slowly that other people could have noticed OR Being so fidgety or restless that you have been moving around a lot more than usual Select one.    Several days   Not selected:    Not at all    More than half the days    Nearly every day   9. Over the past 2 weeks, how often have you been bothered by: Thoughts that you'd be better off dead or thoughts of hurting yourself Select one.    Not at all   Not selected:    Several days    More than half the days    Nearly every day   How difficult have these problems made it for you to work, take care of things at home, or get along with other people? Select one.    Extremely difficult   Not selected:    Not difficult at all    Somewhat difficult    Very difficult   1. Over the past 2 weeks, how often have you been bothered by: Feeling nervous, anxious, or on edge? Select one.    Nearly every day   Not selected:    Not at all    Several days    More than half the days   2. Over the past 2 weeks, how often have you been bothered by: Not being able to stop or control worrying? Select one.    Several days   Not selected:    Not at all    More than half the days    Nearly every day   3. Over the past 2 weeks, how often have you been bothered by: Worrying too much about different things? Select one.    Several days   Not selected:    Not at all    More than half the days    Nearly every day   4. Over the past 2 weeks, how often have you been  bothered by: Having trouble relaxing? Select one.    More than half the days   Not selected:    Not at all    Several days    Nearly every day   5. Over the past 2 weeks, how often have you been bothered by: Being so restless that it's hard to sit still? Select one.    Not at all   Not selected:    Several days    More than half the days    Nearly every day   6. Over the past 2 weeks, how often have you been bothered by: Becoming easily annoyed or irritable? Select one.    Several days   Not selected:    Not at all    More than half the days    Nearly every day   7. Over the past 2 weeks, how often have you been bothered by: Feeling afraid, as if something awful might happen? Select one.    Several days   Not selected:    Not at all    More than half the days    Nearly every day   How difficult have these symptoms made it for you to do your work, take care of things at home, or get along with other people? Select one.    Extremely difficult   Not selected:    Not difficult at all    Somewhat difficult    Very difficult   Over the past 2 weeks, how often have you been bothered by: Sleeping less than usual, but still having a lot of energy? Select one.    Not at all   Not selected:    1 to 2 days    Several days    More than half the days    Nearly every day   Over the past 2 weeks, how often have you been bothered by: Starting lots more projects than usual or doing more risky things than usual? Select one.    Not at all   Not selected:    1 to 2 days    Several days    More than half the days    Nearly every day   Over the past 2 weeks, how often have you been bothered by: Hearing things other people couldn't hear, such as voices even when no one was around? Select one.    Not at all   Not selected:    1 to 2 days    Several days    More than half the days    Nearly every day   Over the past 2 weeks, how often have you been bothered by: Feeling that someone could hear your thoughts, or that you could hear what  "another person was thinking? Select one.    Not at all   Not selected:    1 to 2 days    Several days    More than half the days    Nearly every day   Over the past 2 weeks, how often have you been bothered by: Unpleasant thoughts, urges, or images that repeatedly enter your mind? Select one.    More than half the days   Not selected:    Not at all    1 to 2 days    Several days    Nearly every day   Over the past 2 weeks, how often have you been bothered by: Feeling driven to perform certain behaviors or mental acts over and over again? Select one.    Not at all   Not selected:    1 to 2 days    Several days    More than half the days    Nearly every day   In the past year, how often did you have a drink containing alcohol? Select one.    Never   Not selected:    Monthly or less    2 to 4 times per month    2 to 3 times per week    4 or more times per week   Over the past 2 weeks, how often have you: Smoked any cigarettes, smoked a cigar or pipe, or used snuff or chewing tobacco? Select one.    Not at all   Not selected:    1 to 2 days    Several days    More than half the days    Nearly every day   Over the past 2 weeks, how often did you use any of these medicines on your own? \"On your own\" means without a doctor's prescription, or more than prescribed, or longer than prescribed. - Prescription painkillers, such as Vicodin - Stimulants, such as Ritalin or Adderall - Sedatives or tranquilizers, such as sleeping pills or Valium - Marijuana - Cocaine or crack - Club drugs, such as Ecstasy - Hallucinogens, such as LSD - Heroin - Inhalants or solvents, such as glue - Methamphetamines, such as speed Select one.    Not at all   Not selected:    1 to 2 days    Several days    More than half the days    Nearly every day   Think about all of the symptoms you've shared with us today. How long have you been feeling this way? Select one.    More than a year   Not selected:    Less than a year    I'm not sure   These last few " "questions help us make sure your treatment plan is safe for you. Do you have any of these conditions? Select all that apply.    None of these   Not selected:    Uncorrected or persistent electrolyte abnormalities, such as potassium, sodium, calcium or magnesium    QT prolongation    Congenital long QT syndrome (LQTS)    Ventricular arrhythmias, such as ventricular fibrillation or ventricular tachycardia    Bradycardia (low heart rate)    Recent heart attack    Congestive heart failure (CHF)   Do any of these apply to you now or in the recent past? \"Cold turkey\" here means stopping a medication suddenly rather than slowly taking lower and lower doses until you're off the medication. Select all that apply.    None of these   Not selected:    Seizure disorder    Bulimia or anorexia    Liver disease    Alcohol abuse    Stopped using alcohol \"cold turkey\"    Stopped using a sedative \"cold turkey\"    Stopped using an anti-seizure drug \"cold turkey\"    Stopped using a benzodiazepine drug (Klonopin, Valium, Ativan, Xanax) \"cold turkey\"   Do any of the following apply to you? Select all that apply.    None of these   Not selected:    I'm currently taking pimozide    I'm currently taking thioridazine    I've taken an MAO inhibitor in the last 14 days    I've taken linezolid or IV methylene blue in the last 14 days   Are you still taking these medications listed in your medical record? If you're not taking any of these, click Next. Select all that apply.    lamoTRIgine 100 MG tablet    traZODone 50 MG tablet    venlafaxine XR 75 MG 24 hr capsule   Are you taking any other medications, vitamins, or supplements? Select one.    Yes   Not selected:    No   Have you ever had an allergic or bad reaction to any medication? Select one.    Yes   Not selected:    No   Have you had an allergic or bad reaction to any of these medications? Select all that apply.    None of these   Not selected:    Bupropion (Wellbutrin SR, Wellbutrin XL, " Aplenzin, Zyban)    Buspirone (BuSpar)    Hydroxyzine (Atarax, Vistaril), cetirizine (Zyrtec), or levocetirizine (Xyzal)    Mirtazapine (Remeron)    Trazodone   Have you had an allergic or bad reaction to any of these medications? Select all that apply.    None of these   Not selected:    Citalopram (Celexa)    Desvenlafaxine (Pristiq)    Duloxetine (Cymbalta, Drizalma Sprinkle)    Escitalopram (Lexapro)    Fluoxetine (Prozac)    Paroxetine (Paxil, Paxil CR, Pexeva)    Sertraline (Zoloft)    Venlafaxine (Effexor, Effexor XR)   Do you need a doctor's note? A doctor's note confirms that you received care today and states when you can return to school or work. It does not contain information about your diagnosis or treatment plan. Your provider will make the final decision on whether to give you a doctor's note. Doctor's notes CANNOT be backdated. Select one.    No   Not selected:    Today only (1 day)    Today and tomorrow (2 days)    3 days   What is the main reason you're taking this interview today?    I am having memory issues, to the point that it is affecting my work performance.   ----------   Medical history   The following information was received from the EMR on May 07, 2023.   Allergies:    DIAZEPAM   - Allergy Type: Medication   - Reaction: Other (See Comments), Unknown (See Comments)   - Severity: None   - Clinical Status: Active   - Verification Status: Confirmed    TETRACYCLINES & RELATED   - Allergy Type: Medication   - Reaction: Rash   - Severity: Mild   - Clinical Status: Active   - Verification Status: Confirmed    ALPRAZOLAM   - Allergy Type: Medication   - Reaction: Other (See Comments)   - Severity: Mild   - Clinical Status: Active   - Verification Status: Confirmed    TETRACYCLINE   - Allergy Type: Medication   - Reaction: Hives   - Severity: None   - Clinical Status: Active   - Verification Status: Confirmed   Medications:    lamoTRIgine (laMICtal) tablet   - Route: Oral   - Start Date: March  15, 2023   - End Date: None   - Status: Active    traZODone (DESYREL) tablet   - Route: Oral   - Start Date: March 15, 2023   - End Date: None   - Status: Active    SYNTHROID 100 MCG PO TABS   - Route: Oral   - Start Date: March 15, 2023   - End Date: None   - Status: Active    propranolol (INDERAL) tablet   - Route: Oral   - Start Date: March 15, 2023   - End Date: None   - Status: Active    aspirin EC tablet   - Route: Oral   - Start Date: March 09, 2022   - End Date: None   - Status: Active    atorvastatin (LIPITOR) tablet   - Route: Oral   - Start Date: March 09, 2022   - End Date: None   - Status: Active    venlafaxine (EFFEXOR-XR) 24 hr capsule   - Route: Oral   - Start Date: August 09, 2022   - End Date: None   - Status: Active   Problem list:    Hypothyroidism   - Category: Problem List Item   - Health Status:   - Start Date: October 15, 2019   - End Date: None   - Status: Active    ADHD   - Category: Problem List Item   - Health Status:   - Start Date: January 14, 2020   - End Date: None   - Status: Active    Anxiety and depression   - Category: Problem List Item   - Health Status:   - Start Date: January 14, 2020   - End Date: None   - Status: Active    Coronary artery disease involving native coronary artery of native heart without angina pectoris   - Category: Problem List Item   - Health Status:   - Start Date: August 06, 2020   - End Date: None   - Status: Active    Vitamin D deficiency   - Category: Problem List Item   - Health Status:   - Start Date: August 06, 2020   - End Date: None   - Status: Active    Hyperlipidemia   - Category: Problem List Item   - Health Status:   - Start Date: August 06, 2020   - End Date: None   - Status: Active    Hypotension   - Category: Problem List Item   - Health Status:   - Start Date: January 12, 2021   - End Date: None   - Status: Active    Suicide attempt   - Category: Problem List Item   - Health Status:   - Start Date: January 12, 2021   - End Date: None    - Status: Active    Age-related cataract of both eyes   - Category: Problem List Item   - Health Status:   - Start Date: June 27, 2022   - End Date: None   - Status: Active    Anxiety   - Category: Problem List Item   - Health Status:   - Start Date: June 27, 2022   - End Date: None   - Status: Active

## 2023-05-09 ENCOUNTER — TELEPHONE (OUTPATIENT)
Dept: CARDIOLOGY | Facility: CLINIC | Age: 58
End: 2023-05-09
Payer: COMMERCIAL

## 2023-05-09 NOTE — TELEPHONE ENCOUNTER
[facility] received a fax that requires your attention. The document has been indexed to the patient’s chart for your review.      Reason for sending: AKANKSHA GOLDSMITH- [facility] MED REC NOTIF     Documents Description: REFILL REQUEST     Name of Sender: LASHAY      Date Indexed: 5.8.23    Notes (if needed):

## 2023-05-16 ENCOUNTER — LAB (OUTPATIENT)
Dept: LAB | Facility: HOSPITAL | Age: 58
End: 2023-05-16
Payer: COMMERCIAL

## 2023-05-16 DIAGNOSIS — R74.8 ELEVATED ALKALINE PHOSPHATASE LEVEL: ICD-10-CM

## 2023-05-16 DIAGNOSIS — E03.9 HYPOTHYROIDISM, UNSPECIFIED TYPE: ICD-10-CM

## 2023-05-16 LAB
ALP SERPL-CCNC: 163 U/L (ref 39–117)
TSH SERPL DL<=0.05 MIU/L-ACNC: 1.35 UIU/ML (ref 0.27–4.2)

## 2023-05-16 PROCEDURE — 84075 ASSAY ALKALINE PHOSPHATASE: CPT

## 2023-05-16 PROCEDURE — 84443 ASSAY THYROID STIM HORMONE: CPT

## 2023-05-16 PROCEDURE — 36415 COLL VENOUS BLD VENIPUNCTURE: CPT

## 2023-05-22 DIAGNOSIS — E03.9 HYPOTHYROIDISM, UNSPECIFIED TYPE: ICD-10-CM

## 2023-05-22 DIAGNOSIS — R74.8 ELEVATED ALKALINE PHOSPHATASE LEVEL: Primary | ICD-10-CM

## 2023-08-08 ENCOUNTER — LAB (OUTPATIENT)
Dept: LAB | Facility: HOSPITAL | Age: 58
End: 2023-08-08
Payer: COMMERCIAL

## 2023-08-08 ENCOUNTER — OFFICE VISIT (OUTPATIENT)
Dept: FAMILY MEDICINE CLINIC | Age: 58
End: 2023-08-08
Payer: COMMERCIAL

## 2023-08-08 VITALS
OXYGEN SATURATION: 93 % | DIASTOLIC BLOOD PRESSURE: 73 MMHG | BODY MASS INDEX: 32.53 KG/M2 | TEMPERATURE: 99 F | SYSTOLIC BLOOD PRESSURE: 112 MMHG | HEIGHT: 63 IN | WEIGHT: 183.6 LBS | HEART RATE: 89 BPM

## 2023-08-08 DIAGNOSIS — M79.605 PAIN OF LEFT LOWER EXTREMITY: ICD-10-CM

## 2023-08-08 DIAGNOSIS — Z12.31 ENCOUNTER FOR SCREENING MAMMOGRAM FOR MALIGNANT NEOPLASM OF BREAST: ICD-10-CM

## 2023-08-08 DIAGNOSIS — E03.9 HYPOTHYROIDISM, UNSPECIFIED TYPE: Primary | ICD-10-CM

## 2023-08-08 DIAGNOSIS — R53.83 FATIGUE, UNSPECIFIED TYPE: ICD-10-CM

## 2023-08-08 DIAGNOSIS — E53.8 VITAMIN B12 DEFICIENCY: ICD-10-CM

## 2023-08-08 DIAGNOSIS — R74.8 ELEVATED ALKALINE PHOSPHATASE LEVEL: ICD-10-CM

## 2023-08-08 DIAGNOSIS — E03.9 HYPOTHYROIDISM, UNSPECIFIED TYPE: ICD-10-CM

## 2023-08-08 DIAGNOSIS — Z87.891 HISTORY OF CIGARETTE SMOKING: ICD-10-CM

## 2023-08-08 LAB
BASOPHILS # BLD AUTO: 0.01 10*3/MM3 (ref 0–0.2)
BASOPHILS NFR BLD AUTO: 0.1 % (ref 0–1.5)
DEPRECATED RDW RBC AUTO: 45 FL (ref 37–54)
EOSINOPHIL # BLD AUTO: 0 10*3/MM3 (ref 0–0.4)
EOSINOPHIL NFR BLD AUTO: 0 % (ref 0.3–6.2)
ERYTHROCYTE [DISTWIDTH] IN BLOOD BY AUTOMATED COUNT: 13.4 % (ref 12.3–15.4)
HCT VFR BLD AUTO: 42.4 % (ref 34–46.6)
HGB BLD-MCNC: 14.7 G/DL (ref 12–15.9)
IMM GRANULOCYTES # BLD AUTO: 0.01 10*3/MM3 (ref 0–0.05)
IMM GRANULOCYTES NFR BLD AUTO: 0.1 % (ref 0–0.5)
LYMPHOCYTES # BLD AUTO: 1.62 10*3/MM3 (ref 0.7–3.1)
LYMPHOCYTES NFR BLD AUTO: 23.4 % (ref 19.6–45.3)
MCH RBC QN AUTO: 32 PG (ref 26.6–33)
MCHC RBC AUTO-ENTMCNC: 34.7 G/DL (ref 31.5–35.7)
MCV RBC AUTO: 92.2 FL (ref 79–97)
MONOCYTES # BLD AUTO: 0.55 10*3/MM3 (ref 0.1–0.9)
MONOCYTES NFR BLD AUTO: 7.9 % (ref 5–12)
NEUTROPHILS NFR BLD AUTO: 4.73 10*3/MM3 (ref 1.7–7)
NEUTROPHILS NFR BLD AUTO: 68.5 % (ref 42.7–76)
NRBC BLD AUTO-RTO: 0 /100 WBC (ref 0–0.2)
PLATELET # BLD AUTO: 261 10*3/MM3 (ref 140–450)
PMV BLD AUTO: 9.5 FL (ref 6–12)
RBC # BLD AUTO: 4.6 10*6/MM3 (ref 3.77–5.28)
WBC NRBC COR # BLD: 6.92 10*3/MM3 (ref 3.4–10.8)

## 2023-08-08 PROCEDURE — 82550 ASSAY OF CK (CPK): CPT

## 2023-08-08 PROCEDURE — 99214 OFFICE O/P EST MOD 30 MIN: CPT | Performed by: NURSE PRACTITIONER

## 2023-08-08 PROCEDURE — 84439 ASSAY OF FREE THYROXINE: CPT

## 2023-08-08 PROCEDURE — 36415 COLL VENOUS BLD VENIPUNCTURE: CPT

## 2023-08-08 PROCEDURE — 82607 VITAMIN B-12: CPT

## 2023-08-08 PROCEDURE — 80050 GENERAL HEALTH PANEL: CPT

## 2023-08-08 PROCEDURE — 82977 ASSAY OF GGT: CPT

## 2023-08-08 RX ORDER — QUETIAPINE FUMARATE 100 MG/1
100 TABLET, FILM COATED ORAL NIGHTLY
COMMUNITY
Start: 2023-06-05

## 2023-08-08 RX ORDER — CLINDAMYCIN HYDROCHLORIDE 300 MG/1
300 CAPSULE ORAL 3 TIMES DAILY
COMMUNITY
Start: 2023-08-07

## 2023-08-08 RX ORDER — HYDROCODONE BITARTRATE AND ACETAMINOPHEN 7.5; 325 MG/1; MG/1
1 TABLET ORAL EVERY 4 HOURS PRN
COMMUNITY
Start: 2023-08-07

## 2023-08-08 RX ORDER — VILAZODONE HYDROCHLORIDE 20 MG/1
20 TABLET ORAL DAILY
COMMUNITY
Start: 2023-07-16

## 2023-08-08 RX ORDER — OLANZAPINE 5 MG/1
5 TABLET ORAL NIGHTLY
COMMUNITY
Start: 2023-07-16

## 2023-08-08 NOTE — PROGRESS NOTES
Chief Complaint  Chely Peoples presents to [facility] for Hypothyroidism (Follow up , pt due for mammogram ), Depression, and Hyperlipidemia      Subjective     History of Present Illness  June 1st was at a Mental  health facility - [facility] in [place] was there   x 45 days to get her mental health/medications under control and more stable.     follows up with [facility]     Took B12 injections while there   history B12 deficiency and wants levels checked      Leg pain in the left with movement  improves with rest.  X 3-4 months .  She denies any pain at rest .  She does have a history of smoking over 30 pk years.  No previous evaluation for PVD that she is aware.      She is also wanting a referral to Endo for management of her thyroid.  She is currently on Synthroid branded medication 100mcg and was told while in there - her levels were not stable.  She denies any missed doses      Assessment and Plan       Diagnoses and all orders for this visit:    1. Hypothyroidism, unspecified type (Primary)  Comments:  check levels  ;  UPDATE  increase dose and plan to repeat levels in 4-6 weeks pending referal to endo for management  Orders:  -     Ambulatory Referral to Endocrinology  -     Synthroid 112 MCG tablet; Take 1 tablet by mouth Daily.  Dispense: 30 tablet; Refill: 5  -     TSH Rfx On Abnormal To Free T4; Future    2. Pain of left lower extremity  Comments:  will check labs/ [facility] for evaluation  Orders:  -     CK; Future  -     Doppler Ankle Brachial Index Single Level CAR; Future    3. Fatigue, unspecified type  Comments:  repeat labs  Orders:  -     CBC & Differential; Future    4. Vitamin B12 deficiency  Comments:  repeat levels  Orders:  -     Vitamin B12; Future    5. Encounter for screening mammogram for malignant neoplasm of breast  -     Mammo Screening Digital Tomosynthesis Bilateral With CAD; Future    6. History of cigarette smoking  -      CT Chest Low  "Dose Cancer Screening WO; Future        Follow Up   Return for Pending lab results, Pending imaging results.      New Medications Ordered This Visit   Medications    Synthroid 112 MCG tablet     Sig: Take 1 tablet by mouth Daily.     Dispense:  30 tablet     Refill:  5       Medications Discontinued During This Encounter   Medication Reason    venlafaxine XR (Effexor XR) 75 MG 24 hr capsule Alternate therapy    traZODone (DESYREL) 50 MG tablet Alternate therapy    Synthroid 100 MCG tablet Dose adjustment            Review of Systems    Objective     Vitals:    08/08/23 1604   BP: 112/73   BP Location: Left arm   Patient Position: Sitting   Cuff Size: Adult   Pulse: 89   Temp: 99 øF (37.2 øC)   TempSrc: Oral   SpO2: 93%   Weight: 83.3 kg (183 lb 9.6 oz)   Height: 160 cm (63\")     Body mass index is 32.52 kg/mý.     Physical Exam  Constitutional:       General: She is not in acute distress.     Appearance: Normal appearance.   HENT:      Head: Normocephalic.   Cardiovascular:      Rate and Rhythm: Normal rate and regular rhythm.   Pulmonary:      Effort: Pulmonary effort is normal.      Breath sounds: Normal breath sounds.   Musculoskeletal:         General: Normal range of motion.   Neurological:      General: No focal deficit present.      Mental Status: She is alert and oriented to person, place, and time.   Psychiatric:         Mood and Affect: Mood normal. Affect is flat.         Behavior: Behavior normal.          Result Review                       Allergies   Allergen Reactions    Diazepam Other (See Comments) and Unknown (See Comments)     Used for procedure and made patient \"crazy\" while teenager    Tetracycline Hives    Tetracyclines & Related Rash      Past Medical History:   Diagnosis Date    Anxiety     Anxiety and depression     BMI 29.0-29.9,adult     CAD (coronary artery disease)     COPD (chronic obstructive pulmonary disease)     Depression     Depression     History of anemia     " History of suicide attempt     History of viral gastroenteritis     Hypothyroidism     Suicide attempt      Current Outpatient Medications   Medication Sig Dispense Refill    aspirin 81 MG EC tablet Take 1 tablet by mouth Daily. Whenever she remembers to take it      clindamycin (CLEOCIN) 300 MG capsule Take 1 capsule by mouth 3 (Three) Times a Day.      HYDROcodone-acetaminophen (NORCO) 7.5-325 MG per tablet Take 1 tablet by mouth Every 4 (Four) Hours As Needed.      lamoTRIgine (LaMICtal) 100 MG tablet Take 1 tablet by mouth Daily.      OLANZapine (zyPREXA) 5 MG tablet Take 1 tablet by mouth Every Night.      propranolol (INDERAL) 10 MG tablet Take 1 tablet by mouth 2 (Two) Times a Day As Needed. for anxiety      QUEtiapine (SEROquel) 100 MG tablet Take 1 tablet by mouth Every Night.      vilazodone (VIIBRYD) 20 MG tablet tablet Take 1 tablet by mouth Daily.      atorvastatin (LIPITOR) 40 MG tablet Take 1 tablet by mouth every night at bedtime. (Patient not taking: Reported on 2023) 90 tablet 2    Synthroid 112 MCG tablet Take 1 tablet by mouth Daily. 30 tablet 5     No current facility-administered medications for this visit.     Past Surgical History:   Procedure Laterality Date    APPENDECTOMY       SECTION      COLONOSCOPY      CORONARY STENT PLACEMENT      HYSTERECTOMY      Not due to cancer    OOPHORECTOMY      TONSILLECTOMY        Health Maintenance Due   Topic Date Due    MAMMOGRAM  2014    ZOSTER VACCINE (1 of 2) Never done    LUNG CANCER SCREENING  Never done    ANNUAL PHYSICAL  2021      Immunization History   Administered Date(s) Administered    COVID-19 (PFIZER) Purple Cap Monovalent 2021, 2021, 2022    Flublok 18+yrs 2022    Fluzone >6mos 10/15/2019, 2022    Hepatitis A 2018    Pneumococcal Polysaccharide (PPSV23) 2020    Tdap 2020         Part of this note may be an electronic  transcription/translation of spoken language to printed   text using the Dragon Dictation System.      LOLITA Dailey

## 2023-08-09 LAB
ALBUMIN SERPL-MCNC: 4.3 G/DL (ref 3.5–5.2)
ALBUMIN/GLOB SERPL: 1.5 G/DL
ALP SERPL-CCNC: 130 U/L (ref 39–117)
ALT SERPL W P-5'-P-CCNC: 12 U/L (ref 1–33)
ANION GAP SERPL CALCULATED.3IONS-SCNC: 11.5 MMOL/L (ref 5–15)
AST SERPL-CCNC: 15 U/L (ref 1–32)
BILIRUB SERPL-MCNC: 0.4 MG/DL (ref 0–1.2)
BUN SERPL-MCNC: 11 MG/DL (ref 6–20)
BUN/CREAT SERPL: 13.8 (ref 7–25)
CALCIUM SPEC-SCNC: 10.1 MG/DL (ref 8.6–10.5)
CHLORIDE SERPL-SCNC: 98 MMOL/L (ref 98–107)
CK SERPL-CCNC: 24 U/L (ref 20–180)
CO2 SERPL-SCNC: 26.5 MMOL/L (ref 22–29)
CREAT SERPL-MCNC: 0.8 MG/DL (ref 0.57–1)
EGFRCR SERPLBLD CKD-EPI 2021: 86.6 ML/MIN/1.73
GGT SERPL-CCNC: 18 U/L (ref 5–36)
GLOBULIN UR ELPH-MCNC: 2.8 GM/DL
GLUCOSE SERPL-MCNC: 101 MG/DL (ref 65–99)
POTASSIUM SERPL-SCNC: 3.7 MMOL/L (ref 3.5–5.2)
PROT SERPL-MCNC: 7.1 G/DL (ref 6–8.5)
SODIUM SERPL-SCNC: 136 MMOL/L (ref 136–145)
T4 FREE SERPL-MCNC: 0.8 NG/DL (ref 0.93–1.7)
TSH SERPL DL<=0.05 MIU/L-ACNC: 10.3 UIU/ML (ref 0.27–4.2)
VIT B12 BLD-MCNC: 1088 PG/ML (ref 211–946)

## 2023-08-14 RX ORDER — LEVOTHYROXINE SODIUM 112 MCG
112 TABLET ORAL DAILY
Qty: 30 TABLET | Refills: 5 | Status: SHIPPED | OUTPATIENT
Start: 2023-08-14

## 2023-08-22 ENCOUNTER — READMISSION MANAGEMENT (OUTPATIENT)
Dept: CALL CENTER | Facility: HOSPITAL | Age: 58
End: 2023-08-22
Payer: COMMERCIAL

## 2023-08-22 NOTE — OUTREACH NOTE
Prep Survey      Flowsheet Row Responses   [facility] facility patient discharged from? Non-BH   Is LACE score < 7 ? Non-BH Discharge   Eligibility [facility]   H[facility]   Date of Admission 08/20/23   Date of Discharge 08/22/23   Discharge Disposition Home or Self Care   Discharge diagnosis Cellulitis   Does the patient have one of the following disease processes/diagnoses(primary or secondary)? Other   Prep survey completed? Yes            Caroline SEO - Registered Nurse

## 2023-08-23 ENCOUNTER — TRANSITIONAL CARE MANAGEMENT TELEPHONE ENCOUNTER (OUTPATIENT)
Dept: CALL CENTER | Facility: HOSPITAL | Age: 58
End: 2023-08-23
Payer: COMMERCIAL

## 2023-08-23 NOTE — OUTREACH NOTE
Call Center TCM Note      Flowsheet Row Responses   [facility] patient discharged from? Non-BH   Does the patient have one of the following disease processes/diagnoses(primary or secondary)? Other   TCM attempt successful? No   Unsuccessful attempts Attempt 2            Lois Gonzalez LPN    8/23/2023, 16:06 EDT

## 2023-08-23 NOTE — OUTREACH NOTE
Call Center TCM Note      Flowsheet Row Responses   [facility] patient discharged from? Non-BH   Does the patient have one of the following disease processes/diagnoses(primary or secondary)? Other  [cellulitis]   TCM attempt successful? No  [ VR]   Unsuccessful attempts Attempt 1   Call Status Left message            Nadine Meredith RN    2023, 12:41 EDT

## 2023-08-24 ENCOUNTER — TRANSITIONAL CARE MANAGEMENT TELEPHONE ENCOUNTER (OUTPATIENT)
Dept: CALL CENTER | Facility: HOSPITAL | Age: 58
End: 2023-08-24
Payer: COMMERCIAL

## 2023-08-24 ENCOUNTER — TELEPHONE (OUTPATIENT)
Dept: FAMILY MEDICINE CLINIC | Age: 58
End: 2023-08-24

## 2023-08-24 NOTE — TELEPHONE ENCOUNTER
Caller: [facility]    Relationship: Other    Best call back number: [phone]    What was the call regarding: PATIENT IS HAVING SURGERY ON 09/11/2023. THEY ARE NEEDING TO KNOW IF SURGICAL CLEARANCE FORM WAS RECEIVED VIA FAX. IT WAS FAXED ON 08/22/2023.

## 2023-08-24 NOTE — OUTREACH NOTE
Call Center TCM Note      Flowsheet Row Responses   [facility] patient discharged from? Non-BH   Does the patient have one of the following disease processes/diagnoses(primary or secondary)? Other   TCM attempt successful? No  [ verbal release]   Unsuccessful attempts Attempt 3            Medina Ocasio RN    2023, 08:41 EDT

## 2023-08-24 NOTE — TELEPHONE ENCOUNTER
"    Caller: Chely Peoples \"Darshana\"    Relationship to patient: Self    Best call back number: [phone] OKAY TO LEAVE A MESSAGE ON PHONE    Patient is needing: PATIENT CALLED IN AND IS REQUESTING A REFILL ON \"[facility]\" . PATIENT SAID SHE IS RUNNING OUT OF MEDICATION AND IS CURRENTLY LOOKING FOR NEW SPECIALIST AS SHE HAS BEEN HAVING ISSUES GETTING APPOINTMENTS AND CONTACTING SPECIALIST. PATIENT HAS AN UPCOMING APPOINTMENT ON 8/30/2023        "

## 2023-08-25 RX ORDER — VILAZODONE HYDROCHLORIDE 20 MG/1
20 TABLET ORAL DAILY
Qty: 30 TABLET | Refills: 0 | Status: SHIPPED | OUTPATIENT
Start: 2023-08-25

## 2023-08-25 RX ORDER — OLANZAPINE 5 MG/1
5 TABLET ORAL NIGHTLY
Qty: 30 TABLET | Refills: 0 | Status: SHIPPED | OUTPATIENT
Start: 2023-08-25

## 2023-08-25 RX ORDER — LAMOTRIGINE 100 MG/1
100 TABLET ORAL DAILY
Qty: 30 TABLET | Refills: 0 | Status: SHIPPED | OUTPATIENT
Start: 2023-08-25

## 2023-08-25 NOTE — TELEPHONE ENCOUNTER
Pt did have an appt with them but never got a link for the video visit and she callled their office and got a vm she is looking for someone else . She said the previous prescriber was while she was in patient at a facility . She is on Viibryd 20 mg in am , Zyprexa 5 mg in the pm ,lamictal 100 mg in the pm she is out of meds tonight please advise on call for MM

## 2023-08-29 ENCOUNTER — OFFICE VISIT (OUTPATIENT)
Dept: CARDIOLOGY | Facility: CLINIC | Age: 58
End: 2023-08-29
Payer: COMMERCIAL

## 2023-08-29 VITALS
DIASTOLIC BLOOD PRESSURE: 68 MMHG | BODY MASS INDEX: 32.89 KG/M2 | WEIGHT: 185.6 LBS | HEART RATE: 82 BPM | HEIGHT: 63 IN | SYSTOLIC BLOOD PRESSURE: 122 MMHG

## 2023-08-29 DIAGNOSIS — I25.10 CORONARY ARTERY DISEASE INVOLVING NATIVE CORONARY ARTERY OF NATIVE HEART WITHOUT ANGINA PECTORIS: Primary | ICD-10-CM

## 2023-08-29 DIAGNOSIS — E78.2 MIXED HYPERLIPIDEMIA: ICD-10-CM

## 2023-08-29 RX ORDER — ATORVASTATIN CALCIUM 40 MG/1
40 TABLET, FILM COATED ORAL
Qty: 90 TABLET | Refills: 3 | Status: SHIPPED | OUTPATIENT
Start: 2023-08-29

## 2023-08-29 NOTE — PROGRESS NOTES
"Chief Complaint  Follow-up and Coronary Artery Disease    Subjective        History of Present Illness  Chely Peoples presents to [facility]   Ms. Peoples is a 58-year-old female patient coming in today for follow-up of coronary artery disease.  She reports she is doing well, and has no new concerns at visit today.  Specifically she has no complaints of chest pain/pressure, shortness of breath, palpitations, lightheadedness or dizziness.   He did have ER visit for abscessed tooth earlier this month, but no other hospitalizations since last seen.      Past History:     (1) Coronary artery disease, index presentation being non-STEMI on 2018 followed by angioplasty and stent placement in the right coronary artery. She was also noted to have a 60% stenosis involving the mid left anterior descending artery with calcification. (2) Hyperlipidemia. (3) Hypothyroidism. (4) Negative for diabetes mellitus.    Past Medical History:   Diagnosis Date    Anxiety     Anxiety and depression     BMI 29.0-29.9,adult     CAD (coronary artery disease)     COPD (chronic obstructive pulmonary disease)     Depression     Depression     History of anemia     History of suicide attempt     History of viral gastroenteritis     Hypothyroidism     Suicide attempt        Allergies   Allergen Reactions    Diazepam Other (See Comments) and Unknown (See Comments)     Used for procedure and made patient \"crazy\" while teenager    Tetracycline Hives    Tetracyclines & Related Rash        Past Surgical History:   Procedure Laterality Date    APPENDECTOMY       SECTION      COLONOSCOPY      CORONARY STENT PLACEMENT      HYSTERECTOMY      Not due to cancer    OOPHORECTOMY      TONSILLECTOMY          Social History  She  reports that she quit smoking about 2 years ago. Her smoking use included cigarettes. She has a 39.00 pack-year smoking history. She has never used smokeless tobacco. She reports current " "alcohol use. She reports that she does not use drugs.    Family History  Her family history includes COPD in her mother; Heart disease in her father and another family member; Suicide Attempts in her nephew.       Current Outpatient Medications on File Prior to Visit   Medication Sig    aspirin 81 MG EC tablet Take 1 tablet by mouth Daily. Whenever she remembers to take it    HYDROcodone-acetaminophen (NORCO) 7.5-325 MG per tablet Take 1 tablet by mouth Every 4 (Four) Hours As Needed.    lamoTRIgine (LaMICtal) 100 MG tablet Take 1 tablet by mouth Daily. NO FURTHER REFILLS WILL BE AUTHORIZED    OLANZapine (zyPREXA) 5 MG tablet Take 1 tablet by mouth Every Night. NO FURTHER REFILLS WILL BE AUTHORIZED    propranolol (INDERAL) 10 MG tablet Take 1 tablet by mouth 2 (Two) Times a Day As Needed. for anxiety    QUEtiapine (SEROquel) 100 MG tablet Take 1 tablet by mouth Every Night.    Synthroid 112 MCG tablet Take 1 tablet by mouth Daily. (Patient taking differently: Take 1 tablet by mouth Daily. Hasn't started yet)    vilazodone (VIIBRYD) 20 MG tablet tablet Take 1 tablet by mouth Daily. NO FURTHER REFILLS WITH BE AUTHORIZED     No current facility-administered medications on file prior to visit.         Review of Systems   Constitutional:  Negative for fatigue.   Respiratory:  Negative for cough, chest tightness and shortness of breath.    Cardiovascular:  Negative for chest pain, palpitations and leg swelling.   Gastrointestinal:  Negative for nausea and vomiting.   Neurological:  Negative for dizziness and syncope.   Hematological:  Does not bruise/bleed easily.      Objective   Vitals:    08/29/23 1542   BP: 122/68   Pulse: 82   Weight: 84.2 kg (185 lb 9.6 oz)   Height: 160 cm (63\")         Physical Exam  General : Alert, awake, no acute distress  Neck : Supple, no carotid bruit, no jugular venous distention  CVS : Regular rate and rhythm, no murmur, rubs or gallops  Lungs: Clear to auscultation bilaterally, no " crackles or rhonchi  Abdomen: Soft, nontender, bowel sounds active  Extremities: Warm, well-perfused, no pedal edema      Result Review     The following data was reviewed by LOLITA Acuna  No results found for: PROBNP  CMP          3/15/2023    17:23 5/16/2023    13:51 8/8/2023    17:24   CMP   Glucose 94   101    BUN 13   11    Creatinine 0.78   0.80    EGFR 89.8   86.6    Sodium 141   136    Potassium 3.7   3.7    Chloride 103   98    Calcium 9.5   10.1    Total Protein 7.1   7.1    Albumin 4.4   4.3    Globulin 2.7   2.8    Total Bilirubin 0.5   0.4    Alkaline Phosphatase 147  163  130    AST (SGOT) 15   15    ALT (SGPT) 10   12    Albumin/Globulin Ratio 1.6   1.5    BUN/Creatinine Ratio 16.7   13.8    Anion Gap 11.0   11.5      CBC w/diff          8/8/2023    17:24   CBC w/Diff   WBC 6.92    RBC 4.60    Hemoglobin 14.7    Hematocrit 42.4    MCV 92.2    MCH 32.0    MCHC 34.7    RDW 13.4    Platelets 261    Neutrophil Rel % 68.5    Immature Granulocyte Rel % 0.1    Lymphocyte Rel % 23.4    Monocyte Rel % 7.9    Eosinophil Rel % 0.0    Basophil Rel % 0.1       Lab Results   Component Value Date    TSH 10.300 (H) 08/08/2023      Lab Results   Component Value Date    FREET4 0.80 (L) 08/08/2023             Lipid Panel          3/15/2023    17:23   Lipid Panel   Total Cholesterol 108    Triglycerides 80    HDL Cholesterol 34    VLDL Cholesterol 16    LDL Cholesterol  58    LDL/HDL Ratio 1.71               Assessment and Plan   Diagnoses and all orders for this visit:    1. Coronary artery disease involving native coronary artery of native heart without angina pectoris (Primary)  Assessment & Plan:  She is stable without any symptoms of angina.  She has preserved LV function.  Continue daily aspirin.  Continue statin therapy.      2. Mixed hyperlipidemia  Assessment & Plan:  Well-controlled, most recent LDL is below goal at 58.  Continue atorvastatin 40 mg nightly.  Refill sent.    Orders:  -     atorvastatin  (LIPITOR) 40 MG tablet; Take 1 tablet by mouth every night at bedtime.  Dispense: 90 tablet; Refill: 3                Follow Up   Return in about 1 year (around 8/29/2024) for Next scheduled follow up, with Dr. Owens.    Patient was given instructions and counseling regarding her condition or for health maintenance advice. Please see specific information pulled into the AVS if appropriate.     Signed,  Cheryl Monterroso, APRN  08/29/2023     Dictated Utilizing Dragon Dictation: Please note that portions of this note were completed with a voice recognition program.  Part of this note may be an electronic transcription/translation of spoken language to printed text using the Dragon Dictation System.

## 2023-08-30 ENCOUNTER — OFFICE VISIT (OUTPATIENT)
Dept: FAMILY MEDICINE CLINIC | Age: 58
End: 2023-08-30
Payer: COMMERCIAL

## 2023-08-30 ENCOUNTER — LAB (OUTPATIENT)
Dept: LAB | Facility: HOSPITAL | Age: 58
End: 2023-08-30
Payer: COMMERCIAL

## 2023-08-30 VITALS
BODY MASS INDEX: 33.06 KG/M2 | SYSTOLIC BLOOD PRESSURE: 123 MMHG | HEIGHT: 63 IN | WEIGHT: 186.6 LBS | TEMPERATURE: 98.5 F | OXYGEN SATURATION: 93 % | HEART RATE: 76 BPM | DIASTOLIC BLOOD PRESSURE: 76 MMHG

## 2023-08-30 DIAGNOSIS — I25.10 CORONARY ARTERY DISEASE INVOLVING NATIVE CORONARY ARTERY OF NATIVE HEART WITHOUT ANGINA PECTORIS: ICD-10-CM

## 2023-08-30 DIAGNOSIS — E03.9 HYPOTHYROIDISM, UNSPECIFIED TYPE: ICD-10-CM

## 2023-08-30 DIAGNOSIS — D64.9 ANEMIA, UNSPECIFIED TYPE: ICD-10-CM

## 2023-08-30 DIAGNOSIS — K04.7 DENTAL ABSCESS: Primary | ICD-10-CM

## 2023-08-30 PROBLEM — I95.9 HYPOTENSION: Status: RESOLVED | Noted: 2020-09-13 | Resolved: 2023-08-30

## 2023-08-30 LAB
DEPRECATED RDW RBC AUTO: 50.4 FL (ref 37–54)
ERYTHROCYTE [DISTWIDTH] IN BLOOD BY AUTOMATED COUNT: 13.9 % (ref 12.3–15.4)
HCT VFR BLD AUTO: 39.8 % (ref 34–46.6)
HGB BLD-MCNC: 13 G/DL (ref 12–15.9)
MCH RBC QN AUTO: 31.7 PG (ref 26.6–33)
MCHC RBC AUTO-ENTMCNC: 32.7 G/DL (ref 31.5–35.7)
MCV RBC AUTO: 97.1 FL (ref 79–97)
PLATELET # BLD AUTO: 282 10*3/MM3 (ref 140–450)
PMV BLD AUTO: 8.5 FL (ref 6–12)
RBC # BLD AUTO: 4.1 10*6/MM3 (ref 3.77–5.28)
TSH SERPL DL<=0.05 MIU/L-ACNC: 13.2 UIU/ML (ref 0.27–4.2)
WBC NRBC COR # BLD: 5.87 10*3/MM3 (ref 3.4–10.8)

## 2023-08-30 PROCEDURE — 36415 COLL VENOUS BLD VENIPUNCTURE: CPT

## 2023-08-30 PROCEDURE — 84443 ASSAY THYROID STIM HORMONE: CPT

## 2023-08-30 PROCEDURE — 84439 ASSAY OF FREE THYROXINE: CPT

## 2023-08-30 PROCEDURE — 85027 COMPLETE CBC AUTOMATED: CPT

## 2023-08-30 NOTE — ASSESSMENT & PLAN NOTE
Please use ice, rest, and ibuprofen for pain relief. Please see doctor at custodial for follow up. Please return to ER if symptoms worsen.     Hypertension  Hypertension, commonly called high blood pressure, is when the force of blood pumping through your arteries is too strong. Your arteries are the blood vessels that carry blood from your heart throughout your body. A blood pressure reading consists of a higher number over a lower number, such as 110/72. The higher number (systolic) is the pressure inside your arteries when your heart pumps. The lower number (diastolic) is the pressure inside your arteries when your heart relaxes. Ideally you want your blood pressure below 120/80.  Hypertension forces your heart to work harder to pump blood. Your arteries may become narrow or stiff. Having untreated or uncontrolled hypertension can cause heart attack, stroke, kidney disease, and other problems.  RISK FACTORS  Some risk factors for high blood pressure are controllable. Others are not.   Risk factors you cannot control include:   · Race. You may be at higher risk if you are .  · Age. Risk increases with age.  · Gender. Men are at higher risk than women before age 45 years. After age 65, women are at higher risk than men.  Risk factors you can control include:  · Not getting enough exercise or physical activity.  · Being overweight.  · Getting too much fat, sugar, calories, or salt in your diet.  · Drinking too much alcohol.  SIGNS AND SYMPTOMS  Hypertension does not usually cause signs or symptoms. Extremely high blood pressure (hypertensive crisis) may cause headache, anxiety, shortness of breath, and nosebleed.  DIAGNOSIS  To check if you have hypertension, your health care provider will measure your blood pressure while you are seated, with your arm held at the level of your heart. It should be measured at least twice using the same arm. Certain conditions can cause a difference in blood pressure  She is stable without any symptoms of angina.  She has preserved LV function.  Continue daily aspirin.  Continue statin therapy.   between your right and left arms. A blood pressure reading that is higher than normal on one occasion does not mean that you need treatment. If it is not clear whether you have high blood pressure, you may be asked to return on a different day to have your blood pressure checked again. Or, you may be asked to monitor your blood pressure at home for 1 or more weeks.  TREATMENT  Treating high blood pressure includes making lifestyle changes and possibly taking medicine. Living a healthy lifestyle can help lower high blood pressure. You may need to change some of your habits.  Lifestyle changes may include:  · Following the DASH diet. This diet is high in fruits, vegetables, and whole grains. It is low in salt, red meat, and added sugars.  · Keep your sodium intake below 2,300 mg per day.  · Getting at least 30-45 minutes of aerobic exercise at least 4 times per week.  · Losing weight if necessary.  · Not smoking.  · Limiting alcoholic beverages.  · Learning ways to reduce stress.  Your health care provider may prescribe medicine if lifestyle changes are not enough to get your blood pressure under control, and if one of the following is true:  · You are 18-59 years of age and your systolic blood pressure is above 140.  · You are 60 years of age or older, and your systolic blood pressure is above 150.  · Your diastolic blood pressure is above 90.  · You have diabetes, and your systolic blood pressure is over 140 or your diastolic blood pressure is over 90.  · You have kidney disease and your blood pressure is above 140/90.  · You have heart disease and your blood pressure is above 140/90.  Your personal target blood pressure may vary depending on your medical conditions, your age, and other factors.  HOME CARE INSTRUCTIONS  · Have your blood pressure rechecked as directed by your health care provider.    · Take medicines only as directed by your health care provider. Follow the directions carefully. Blood pressure  medicines must be taken as prescribed. The medicine does not work as well when you skip doses. Skipping doses also puts you at risk for problems.  · Do not smoke.    · Monitor your blood pressure at home as directed by your health care provider.   SEEK MEDICAL CARE IF:   · You think you are having a reaction to medicines taken.  · You have recurrent headaches or feel dizzy.  · You have swelling in your ankles.  · You have trouble with your vision.  SEEK IMMEDIATE MEDICAL CARE IF:  · You develop a severe headache or confusion.  · You have unusual weakness, numbness, or feel faint.  · You have severe chest or abdominal pain.  · You vomit repeatedly.  · You have trouble breathing.  MAKE SURE YOU:   · Understand these instructions.  · Will watch your condition.  · Will get help right away if you are not doing well or get worse.     This information is not intended to replace advice given to you by your health care provider. Make sure you discuss any questions you have with your health care provider.     Document Released: 12/18/2006 Document Revised: 05/03/2016 Document Reviewed: 10/10/2014  [employer] Interactive Patient Education ©2017 [employer]

## 2023-08-30 NOTE — ASSESSMENT & PLAN NOTE
Well-controlled, most recent LDL is below goal at 58.  Continue atorvastatin 40 mg nightly.  Refill sent.

## 2023-08-30 NOTE — PROGRESS NOTES
Chief Complaint  Chely Peoples presents to [facility] for Transitional Care Management (Pt reported to [facility] on 8/20/23. Pt was admitted & D/C on 8/22/23 /Dx: Facial Swelling due to two Abscess Teeth )      Subjective     History of Present Illness  Darshana IS here today to follow up after a recent hospitalization.  Chely was admitted to [facility] on 8/20/2023 and was discharged on 8/22/2023 with a DIAGNOSIS  of  facial cellulitis/ dental abcess.    Her TREATMENT included IV antibiotics - PCN G and flagyl during the hospital stay.     I reviewed :   -discharge summary yes   -hospital problems yes   -inpatient lab results (yes  -inpatient diagnostic studies yes  -consultation reports no - n/a  with Chely and discussed the details of the hospital admission and she did not have any questions or concerns.      During his hospital stay, she  was treated by hospitalists and consults during hospitalization included none.      Pertinent diagnostic findings during hospital stay included:    Labs:     SEE LINKED LABS REVIEWED    Imaging:    SEE LINKED LABS REVIEWED     Additional discharge recommendations include:Follow up with oral surgeon as scheduled.     Follow up appts already scheduled  include:  Future Appointments  11/9/2023  2:15 PM    LILY CT 2                   [facility] CT           LILY  11/9/2023  2:45 PM    [facility] MAMM 2                 [facility] MAMMO        LILY  9/4/2024   2:45 PM    Jose Luis Owens MD      [facility] MARIIA MALDONADO .    Appointments that need to be made include:none    Chely reports that her condition is improved since discharge.    Within 48 business hours after discharge our office contacted her via telephone to coordinate her care and needs.    Current outpatient and discharge medications have been reconciled for the patient.  Reviewed by: LOLITA Dailey    She will be having a tooth extraction with dr. Conteh September 11- she is  "still on antibiotic with 3 days remaining  She did have some anemia on her CBC at discharge so we will follow up on this today.  She is needing surgical clearance form completed for this tooth extraction.  She is overall healthy .  She is on ASA for history non stemi we will request input from cardiology for recommendations regarding ASA .            Assessment and Plan       Diagnoses and all orders for this visit:    1. Dental abscess (Primary)  Comments:  Will get input from cardiology for recommendations regarding asa management prior to tooth extraction    2. Anemia, unspecified type  Comments:  repeat labs today  Orders:  -     CBC No Differential; Future    3. Coronary artery disease involving native coronary artery of native heart without angina pectoris  Comments:  request input from cardiology regarding ASA recommendations regarding tooth extraction        Follow Up   No follow-ups on file.      No orders of the defined types were placed in this encounter.      There are no discontinued medications.         Review of Systems    Objective     Vitals:    08/30/23 0922   BP: 123/76   BP Location: Right arm   Patient Position: Sitting   Cuff Size: Adult   Pulse: 76   Temp: 98.5 øF (36.9 øC)   TempSrc: Oral   SpO2: 93%   Weight: 84.6 kg (186 lb 9.6 oz)   Height: 160 cm (63\")     Body mass index is 33.05 kg/mý.     Physical Exam  Constitutional:       General: She is not in acute distress.     Appearance: Normal appearance.   HENT:      Head: Normocephalic.   Cardiovascular:      Rate and Rhythm: Normal rate and regular rhythm.   Pulmonary:      Effort: Pulmonary effort is normal.      Breath sounds: Normal breath sounds.   Musculoskeletal:         General: Normal range of motion.   Lymphadenopathy:      Head:      Right side of head: No submental or submandibular adenopathy.      Left side of head: No submental or submandibular adenopathy.   Neurological:      General: No focal deficit present.      Mental " "Status: She is alert and oriented to person, place, and time.   Psychiatric:         Mood and Affect: Mood normal.         Behavior: Behavior normal.          Result Review   The following data was reviewed by: LOLITA Dailey on 08/30/2023:  CBC - Hemogram (SJ-BKR) (08/22/2023 05:20)  BASIC METABOLIC PANEL (08/22/2023 05:20)  BASIC METABOLIC PANEL (08/21/2023 03:07)  CBC with automated diff (08/21/2023 03:07)  Lactic acid (SJ) (08/20/2023 21:07)  Lactic Acid with reflex (SJ) (08/20/2023 18:20)  CBC with Auto Diff (08/20/2023 18:20)  PROCALCITONIN (08/20/2023 18:20)  C-Reactive Protein (08/20/2023 18:20)  COMPREHENSIVE METABOLIC PANEL (08/20/2023 18:20)  CT neck soft tissue with IV contrast (08/20/2023 19:19)                 Allergies   Allergen Reactions    Diazepam Other (See Comments) and Unknown (See Comments)     Used for procedure and made patient \"crazy\" while teenager    Tetracycline Hives    Tetracyclines & Related Rash      Past Medical History:   Diagnosis Date    Anxiety     Anxiety and depression     BMI 29.0-29.9,adult     CAD (coronary artery disease)     COPD (chronic obstructive pulmonary disease)     Depression     Depression     History of anemia     History of suicide attempt     History of viral gastroenteritis     Hypothyroidism     Suicide attempt      Current Outpatient Medications   Medication Sig Dispense Refill    aspirin 81 MG EC tablet Take 1 tablet by mouth Daily. Whenever she remembers to take it      atorvastatin (LIPITOR) 40 MG tablet Take 1 tablet by mouth every night at bedtime. 90 tablet 3    HYDROcodone-acetaminophen (NORCO) 7.5-325 MG per tablet Take 1 tablet by mouth Every 4 (Four) Hours As Needed.      lamoTRIgine (LaMICtal) 100 MG tablet Take 1 tablet by mouth Daily. NO FURTHER REFILLS WILL BE AUTHORIZED 30 tablet 0    OLANZapine (zyPREXA) 5 MG tablet Take 1 tablet by mouth Every Night. NO FURTHER REFILLS WILL BE AUTHORIZED 30 tablet 0    propranolol (INDERAL) 10 MG " tablet Take 1 tablet by mouth 2 (Two) Times a Day As Needed. for anxiety      QUEtiapine (SEROquel) 100 MG tablet Take 1 tablet by mouth Every Night.      Synthroid 112 MCG tablet Take 1 tablet by mouth Daily. (Patient taking differently: Take 1 tablet by mouth Daily. Hasn't started yet) 30 tablet 5    vilazodone (VIIBRYD) 20 MG tablet tablet Take 1 tablet by mouth Daily. NO FURTHER REFILLS WITH BE AUTHORIZED 30 tablet 0     No current facility-administered medications for this visit.     Past Surgical History:   Procedure Laterality Date    APPENDECTOMY       SECTION      COLONOSCOPY      CORONARY STENT PLACEMENT      HYSTERECTOMY      Not due to cancer    OOPHORECTOMY      TONSILLECTOMY        Health Maintenance Due   Topic Date Due    BMI FOLLOWUP  Never done    MAMMOGRAM  2014    ZOSTER VACCINE (1 of 2) Never done    LUNG CANCER SCREENING  Never done    ANNUAL PHYSICAL  2021      Immunization History   Administered Date(s) Administered    COVID-19 (PFIZER) Purple Cap Monovalent 2021, 2021, 2022    Flublok 18+yrs 2022    Fluzone >6mos 10/15/2019, 2022    Hepatitis A 2018    Pneumococcal Polysaccharide (PPSV23) 2020    Tdap 2020         Part of this note may be an electronic transcription/translation of spoken language to printed   text using the Dragon Dictation System.      LOLITA Dailey

## 2023-08-31 LAB — T4 FREE SERPL-MCNC: 1.05 NG/DL (ref 0.93–1.7)

## 2023-09-01 ENCOUNTER — TELEPHONE (OUTPATIENT)
Dept: FAMILY MEDICINE CLINIC | Age: 58
End: 2023-09-01
Payer: COMMERCIAL

## 2023-09-01 DIAGNOSIS — E03.9 HYPOTHYROIDISM, UNSPECIFIED TYPE: ICD-10-CM

## 2023-09-01 NOTE — TELEPHONE ENCOUNTER
"Caller: Chely Peoples \"Darshana\"    Relationship to patient: Self    Best call back number: [phone]     Patient is needing: PATIENT WAS ADVISED BY PHARMACY 8.31.2023 THAT THIS MEDICATION WAS NOT ON FILE THERE. THE PRESCRIPTION NEEDS TO BE RESENT    Synthroid 112 MCG tablet  112 mcg, Daily       [id number] - [place], [place] - [address] AT [facility] [address] - [phone] [facility] [phone]  [phone]     PLEASE ADVISE PATIENT ONCE DONE     "

## 2023-09-05 RX ORDER — LEVOTHYROXINE SODIUM 112 MCG
112 TABLET ORAL DAILY
Qty: 30 TABLET | Refills: 5 | Status: SHIPPED | OUTPATIENT
Start: 2023-09-05

## 2023-09-20 RX ORDER — OLANZAPINE 5 MG/1
TABLET ORAL
Qty: 30 TABLET | Refills: 0 | OUTPATIENT
Start: 2023-09-20

## 2023-09-20 RX ORDER — LAMOTRIGINE 100 MG/1
TABLET ORAL
Qty: 30 TABLET | Refills: 0 | OUTPATIENT
Start: 2023-09-20

## 2023-09-20 NOTE — TELEPHONE ENCOUNTER
I called pt and it should not have been sent to use she will call her new provider for these meds

## 2023-11-09 ENCOUNTER — HOSPITAL ENCOUNTER (OUTPATIENT)
Dept: MAMMOGRAPHY | Facility: HOSPITAL | Age: 58
Discharge: HOME | End: 2023-11-09
Payer: COMMERCIAL

## 2023-11-10 ENCOUNTER — TELEPHONE (OUTPATIENT)
Dept: FAMILY MEDICINE CLINIC | Age: 58
End: 2023-11-10
Payer: COMMERCIAL

## 2024-01-05 ENCOUNTER — HOSPITAL ENCOUNTER (EMERGENCY)
Facility: HOSPITAL | Age: 59
Discharge: HOME | End: 2024-01-05
Attending: EMERGENCY MEDICINE
Payer: COMMERCIAL

## 2024-01-05 ENCOUNTER — APPOINTMENT (OUTPATIENT)
Dept: GENERAL RADIOLOGY | Facility: HOSPITAL | Age: 59
End: 2024-01-05
Payer: COMMERCIAL

## 2024-01-05 VITALS
HEIGHT: 63 IN | HEART RATE: 89 BPM | RESPIRATION RATE: 16 BRPM | WEIGHT: 170.64 LBS | TEMPERATURE: 97.6 F | DIASTOLIC BLOOD PRESSURE: 78 MMHG | BODY MASS INDEX: 30.23 KG/M2 | SYSTOLIC BLOOD PRESSURE: 118 MMHG | OXYGEN SATURATION: 100 %

## 2024-01-05 DIAGNOSIS — J06.9 VIRAL URI: Primary | ICD-10-CM

## 2024-01-05 DIAGNOSIS — R42 DIZZINESS: ICD-10-CM

## 2024-01-05 LAB
ALBUMIN SERPL-MCNC: 4.8 G/DL (ref 3.5–5.2)
ALBUMIN/GLOB SERPL: 1.5 G/DL
ALP SERPL-CCNC: 150 U/L (ref 39–117)
ALT SERPL W P-5'-P-CCNC: 11 U/L (ref 1–33)
ANION GAP SERPL CALCULATED.3IONS-SCNC: 17.8 MMOL/L (ref 5–15)
AST SERPL-CCNC: 15 U/L (ref 1–32)
BACTERIA UR QL AUTO: ABNORMAL /HPF
BASOPHILS # BLD AUTO: 0 10*3/MM3 (ref 0–0.2)
BASOPHILS NFR BLD AUTO: 0 % (ref 0–1.5)
BILIRUB SERPL-MCNC: 0.9 MG/DL (ref 0–1.2)
BILIRUB UR QL STRIP: NEGATIVE
BUN SERPL-MCNC: 16 MG/DL (ref 6–20)
BUN/CREAT SERPL: 18.4 (ref 7–25)
CALCIUM SPEC-SCNC: 10.3 MG/DL (ref 8.6–10.5)
CHLORIDE SERPL-SCNC: 100 MMOL/L (ref 98–107)
CLARITY UR: ABNORMAL
CO2 SERPL-SCNC: 21.2 MMOL/L (ref 22–29)
COLOR UR: ABNORMAL
CREAT SERPL-MCNC: 0.87 MG/DL (ref 0.57–1)
DEPRECATED RDW RBC AUTO: 42.5 FL (ref 37–54)
EGFRCR SERPLBLD CKD-EPI 2021: 78.3 ML/MIN/1.73
EOSINOPHIL # BLD AUTO: 0 10*3/MM3 (ref 0–0.4)
EOSINOPHIL NFR BLD AUTO: 0 % (ref 0.3–6.2)
ERYTHROCYTE [DISTWIDTH] IN BLOOD BY AUTOMATED COUNT: 12.9 % (ref 12.3–15.4)
GLOBULIN UR ELPH-MCNC: 3.3 GM/DL
GLUCOSE SERPL-MCNC: 118 MG/DL (ref 65–99)
GLUCOSE UR STRIP-MCNC: NEGATIVE MG/DL
GRAN CASTS URNS QL MICRO: ABNORMAL /LPF
HCT VFR BLD AUTO: 45.9 % (ref 34–46.6)
HGB BLD-MCNC: 15.9 G/DL (ref 12–15.9)
HGB UR QL STRIP.AUTO: NEGATIVE
HOLD SPECIMEN: NORMAL
HOLD SPECIMEN: NORMAL
HYALINE CASTS UR QL AUTO: ABNORMAL /LPF
IMM GRANULOCYTES # BLD AUTO: 0.01 10*3/MM3 (ref 0–0.05)
IMM GRANULOCYTES NFR BLD AUTO: 0.2 % (ref 0–0.5)
KETONES UR QL STRIP: ABNORMAL
LEUKOCYTE ESTERASE UR QL STRIP.AUTO: ABNORMAL
LYMPHOCYTES # BLD AUTO: 0.98 10*3/MM3 (ref 0.7–3.1)
LYMPHOCYTES NFR BLD AUTO: 17 % (ref 19.6–45.3)
MAGNESIUM SERPL-MCNC: 2.1 MG/DL (ref 1.6–2.6)
MCH RBC QN AUTO: 31.5 PG (ref 26.6–33)
MCHC RBC AUTO-ENTMCNC: 34.6 G/DL (ref 31.5–35.7)
MCV RBC AUTO: 91.1 FL (ref 79–97)
MONOCYTES # BLD AUTO: 0.39 10*3/MM3 (ref 0.1–0.9)
MONOCYTES NFR BLD AUTO: 6.7 % (ref 5–12)
NEUTROPHILS NFR BLD AUTO: 4.4 10*3/MM3 (ref 1.7–7)
NEUTROPHILS NFR BLD AUTO: 76.1 % (ref 42.7–76)
NITRITE UR QL STRIP: NEGATIVE
NRBC BLD AUTO-RTO: 0 /100 WBC (ref 0–0.2)
NT-PROBNP SERPL-MCNC: 70.8 PG/ML (ref 0–900)
PH UR STRIP.AUTO: 5.5 [PH] (ref 5–8)
PLATELET # BLD AUTO: 257 10*3/MM3 (ref 140–450)
PMV BLD AUTO: 8.8 FL (ref 6–12)
POTASSIUM SERPL-SCNC: 4.3 MMOL/L (ref 3.5–5.2)
PROT SERPL-MCNC: 8.1 G/DL (ref 6–8.5)
PROT UR QL STRIP: ABNORMAL
RBC # BLD AUTO: 5.04 10*6/MM3 (ref 3.77–5.28)
RBC # UR STRIP: ABNORMAL /HPF
REF LAB TEST METHOD: ABNORMAL
SODIUM SERPL-SCNC: 139 MMOL/L (ref 136–145)
SP GR UR STRIP: 1.02 (ref 1–1.03)
SQUAMOUS #/AREA URNS HPF: ABNORMAL /HPF
TROPONIN T SERPL HS-MCNC: 8 NG/L
TSH SERPL DL<=0.05 MIU/L-ACNC: 0.89 UIU/ML (ref 0.27–4.2)
UROBILINOGEN UR QL STRIP: ABNORMAL
WBC # UR STRIP: ABNORMAL /HPF
WBC NRBC COR # BLD AUTO: 5.78 10*3/MM3 (ref 3.4–10.8)
WHOLE BLOOD HOLD COAG: NORMAL
WHOLE BLOOD HOLD SPECIMEN: NORMAL

## 2024-01-05 PROCEDURE — 71045 X-RAY EXAM CHEST 1 VIEW: CPT

## 2024-01-05 PROCEDURE — 25810000003 SODIUM CHLORIDE 0.9 % SOLUTION

## 2024-01-05 PROCEDURE — 82948 REAGENT STRIP/BLOOD GLUCOSE: CPT

## 2024-01-05 PROCEDURE — 93005 ELECTROCARDIOGRAM TRACING: CPT | Performed by: EMERGENCY MEDICINE

## 2024-01-05 PROCEDURE — 83735 ASSAY OF MAGNESIUM: CPT

## 2024-01-05 PROCEDURE — 93005 ELECTROCARDIOGRAM TRACING: CPT

## 2024-01-05 PROCEDURE — 36415 COLL VENOUS BLD VENIPUNCTURE: CPT

## 2024-01-05 PROCEDURE — 81001 URINALYSIS AUTO W/SCOPE: CPT | Performed by: EMERGENCY MEDICINE

## 2024-01-05 PROCEDURE — 83880 ASSAY OF NATRIURETIC PEPTIDE: CPT

## 2024-01-05 PROCEDURE — 80050 GENERAL HEALTH PANEL: CPT

## 2024-01-05 PROCEDURE — 99284 EMERGENCY DEPT VISIT MOD MDM: CPT

## 2024-01-05 PROCEDURE — 84484 ASSAY OF TROPONIN QUANT: CPT | Performed by: EMERGENCY MEDICINE

## 2024-01-05 RX ORDER — SODIUM CHLORIDE 0.9 % (FLUSH) 0.9 %
10 SYRINGE (ML) INJECTION AS NEEDED
Status: DISCONTINUED | OUTPATIENT
Start: 2024-01-05 | End: 2024-01-06 | Stop reason: HOSPADM

## 2024-01-05 RX ORDER — MECLIZINE HYDROCHLORIDE 25 MG/1
25 TABLET ORAL 3 TIMES DAILY PRN
Qty: 15 TABLET | Refills: 0 | Status: SHIPPED | OUTPATIENT
Start: 2024-01-05 | End: 2024-01-10

## 2024-01-05 RX ORDER — ONDANSETRON 4 MG/1
4 TABLET, ORALLY DISINTEGRATING ORAL EVERY 8 HOURS PRN
Qty: 15 TABLET | Refills: 0 | Status: SHIPPED | OUTPATIENT
Start: 2024-01-05 | End: 2024-01-10

## 2024-01-05 RX ADMIN — SODIUM CHLORIDE 1000 ML: 9 INJECTION, SOLUTION INTRAVENOUS at 22:38

## 2024-01-05 NOTE — Clinical Note
[facility] EMERGENCY ROOM  [address]  [place] [address]  Phone: [phone]    Chely Peoples was seen and treated in our emergency department on 1/5/2024.  She may return to work on 01/06/2024.         Thank you for choosing [facility].    Rey Ramsey PA-C

## 2024-01-05 NOTE — Clinical Note
[facility] EMERGENCY ROOM  [address]  [place] [address]  Phone: [phone]    Chely Peoples was seen and treated in our emergency department on 1/5/2024.  She may return to work on 01/06/2024.         Thank you for choosing [facility].    Rey Ramsey PA-C

## 2024-01-06 LAB
QT INTERVAL: 373 MS
QTC INTERVAL: 463 MS

## 2024-01-06 NOTE — ED PROVIDER NOTES
"Time: 8:38 PM EST  Date of encounter:  2024  Independent Historian/Clinical History and Information was obtained by:   Patient    History is limited by: N/A    Chief Complaint   Patient presents with    Dizziness    Nausea         History of Present Illness:  Patient is a 58 y.o. year old female who presents to the emergency department for evaluation of dizziness, nausea for the last couple of days.  Patient reports she was seen in urgent care earlier today and sent to the ED.  Patient states she had a cardiac stent placed in 2017.  Patient denies chest pain and shortness of air today.  Patient states she has had body aches and mild congestion.  Patient states she was swabbed in urgent care and tested negative for COVID and influenza.  Patient states she is no longer nauseous or dizzy after receiving Zofran.    Patient Care Team  Primary Care Provider: Nusrat Malcolm APRN    Past Medical History:     Allergies   Allergen Reactions    Diazepam Other (See Comments) and Unknown (See Comments)     Used for procedure and made patient \"crazy\" while teenager    Tetracycline Hives    Tetracyclines & Related Rash     Past Medical History:   Diagnosis Date    Anxiety     Anxiety and depression     BMI 29.0-29.9,adult     CAD (coronary artery disease)     COPD (chronic obstructive pulmonary disease)     Depression     Depression     History of anemia     History of suicide attempt     History of viral gastroenteritis     Hypothyroidism     Suicide attempt      Past Surgical History:   Procedure Laterality Date    APPENDECTOMY       SECTION      COLONOSCOPY      CORONARY STENT PLACEMENT  2018    HYSTERECTOMY      Not due to cancer    OOPHORECTOMY      TONSILLECTOMY       Family History   Problem Relation Age of Onset    COPD Mother     Heart disease Other         FH in males before the age of 55    Suicide Attempts Nephew     Heart disease Father        Home Medications:  Prior to Admission medications  "   Medication Sig Start Date End Date Taking? Authorizing Provider   aspirin 81 MG EC tablet Take 1 tablet by mouth Daily. Whenever she remembers to take it    Stephenie Ramon MD   atorvastatin (LIPITOR) 40 MG tablet Take 1 tablet by mouth every night at bedtime. 23   Cheryl Monterroso APRN   OLANZapine (zyPREXA) 2.5 MG tablet Take 1 tablet by mouth Daily. 10/24/23   Stephenie Ramon MD   ondansetron (ZOFRAN) 8 MG tablet  23   Stephenie Ramon MD   propranolol (INDERAL) 10 MG tablet Take 1 tablet by mouth 2 (Two) Times a Day As Needed. for anxiety 23   Stephenie Ramon MD   QUEtiapine (SEROquel) 100 MG tablet Take 1 tablet by mouth Every Night. 23   Stephenie Ramon MD   Synthroid 112 MCG tablet Take 1 tablet by mouth Daily. 23   Nusrat Malcolm APRN   vilazodone (VIIBRYD) 40 MG tablet tablet Take 1 tablet by mouth Daily. 10/20/23   Stephenie Ramon MD   HYDROcodone-acetaminophen (NORCO) 7.5-325 MG per tablet Take 1 tablet by mouth Every 4 (Four) Hours As Needed. 23  Stephenie Ramon MD   lamoTRIgine (LaMICtal) 100 MG tablet Take 1 tablet by mouth Daily. NO FURTHER REFILLS WILL BE AUTHORIZED 23  Nusrat Malcolm APRN   OLANZapine (zyPREXA) 5 MG tablet Take 1 tablet by mouth Every Night. NO FURTHER REFILLS WILL BE AUTHORIZED 23  Nusrat Malcolm APRN   vilazodone (VIIBRYD) 20 MG tablet tablet Take 1 tablet by mouth Daily. NO FURTHER REFILLS WITH BE AUTHORIZED 23  Nusrat Malcolm APRN        Social History:   Social History     Tobacco Use    Smoking status: Former     Packs/day: 1.00     Years: 39.00     Additional pack years: 0.00     Total pack years: 39.00     Types: Cigarettes     Quit date: 2021     Years since quittin.7    Smokeless tobacco: Never    Tobacco comments:     smokes less than 1 pack per day for 35 years   Vaping Use    Vaping Use: Former    Quit date: 2021    Substances: Nicotine  "  Substance Use Topics    Alcohol use: Yes     Comment: 1 BEER A YEAR \"MAYBE\"    Drug use: Never         Review of Systems:  Review of Systems   Constitutional:  Negative for chills and fever.   HENT:  Negative for congestion, rhinorrhea and sore throat.    Eyes:  Negative for pain and visual disturbance.   Respiratory:  Negative for apnea, cough, chest tightness and shortness of breath.    Cardiovascular:  Negative for chest pain and palpitations.   Gastrointestinal:  Positive for nausea. Negative for abdominal pain, diarrhea and vomiting.   Genitourinary:  Negative for difficulty urinating and dysuria.   Musculoskeletal:  Negative for joint swelling and myalgias.   Skin:  Negative for color change.   Neurological:  Positive for dizziness. Negative for seizures and headaches.   Psychiatric/Behavioral: Negative.     All other systems reviewed and are negative.       Physical Exam:  /61   Pulse 89   Temp 97.6 °F (36.4 °C) (Oral)   Resp 16   Ht 160 cm (63\")   Wt 77.4 kg (170 lb 10.2 oz)   SpO2 93%   BMI 30.23 kg/m²         Physical Exam  Vitals and nursing note reviewed.   Constitutional:       General: She is not in acute distress.     Appearance: Normal appearance. She is not toxic-appearing.   HENT:      Head: Normocephalic and atraumatic.      Jaw: There is normal jaw occlusion.      Mouth/Throat:      Mouth: Mucous membranes are moist.   Eyes:      General: Lids are normal.      Extraocular Movements: Extraocular movements intact.      Conjunctiva/sclera: Conjunctivae normal.      Pupils: Pupils are equal, round, and reactive to light.   Cardiovascular:      Rate and Rhythm: Normal rate and regular rhythm.      Pulses: Normal pulses.      Heart sounds: Normal heart sounds.   Pulmonary:      Effort: Pulmonary effort is normal. No respiratory distress.      Breath sounds: Normal breath sounds. No wheezing or rhonchi.   Abdominal:      General: Abdomen is flat. There is no distension.      Palpations: " Abdomen is soft.      Tenderness: There is no abdominal tenderness. There is no guarding or rebound.   Musculoskeletal:         General: Normal range of motion.      Cervical back: Normal range of motion and neck supple.      Right lower leg: No edema.      Left lower leg: No edema.   Skin:     General: Skin is warm and dry.   Neurological:      General: No focal deficit present.      Mental Status: She is alert and oriented to person, place, and time. Mental status is at baseline.   Psychiatric:         Mood and Affect: Mood normal.         Behavior: Behavior normal.                      Procedures:  Procedures      Medical Decision Making:      Comorbidities that affect care:    Coronary artery disease, COPD    External Notes reviewed:          The following orders were placed and all results were independently analyzed by me:  Orders Placed This Encounter   Procedures    XR Chest 1 View    [place] Draw    Comprehensive Metabolic Panel    Single High Sensitivity Troponin T    Magnesium    Urinalysis With Microscopic If Indicated (No Culture) - Urine, Clean Catch    CBC Auto Differential    BNP    TSH Rfx On Abnormal To Free T4    Urinalysis, Microscopic Only - Urine, Clean Catch    Ambulatory Referral to Cardiology    NPO Diet NPO Type: Strict NPO    Undress & Gown    Continuous Pulse Oximetry    Vital Signs    Orthostatic Blood Pressure    Oxygen Therapy- Nasal Cannula; Titrate 1-6 LPM Per SpO2; 90 - 95%    POC Glucose Once    ECG 12 Lead ED Triage Standing Order; Weak / Dizzy / AMS    Insert Peripheral IV    Fall Precautions    CBC & Differential    Green Top (Gel)    Lavender Top    Gold Top - SST    Light Blue Top       Medications Given in the Emergency Department:  Medications   sodium chloride 0.9 % flush 10 mL (has no administration in time range)   sodium chloride 0.9 % bolus 1,000 mL (has no administration in time range)        ED Course:    The patient was initially evaluated in the triage area where  orders were placed. The patient was later dispositioned by Rey Ramsey PA-C.      The patient was advised to stay for completion of workup which includes but is not limited to communication of labs and radiological results, reassessment and plan. The patient was advised that leaving prior to disposition by a provider could result in critical findings that are not communicated to the patient.          Labs:    Lab Results (last 24 hours)       Procedure Component Value Units Date/Time    POC Glucose Once [[id number]]  (Abnormal) Collected: 01/05/24 1912    Specimen: Blood Updated: 01/05/24 1914     Glucose 115 mg/dL      Comment: Serial Number: [id number]:  [id number]       POCT SARS-CoV-2 Antigen ASIA   ([facility]) [[id number]] Collected: 01/05/24 1929    Specimen: Swab Updated: 01/05/24 1930     SARS Antigen Not Detected     Internal Control Passed     Lot Number [id number]     Expiration Date 1/16/25    POC Influenza A/B [[id number]] Collected: 01/05/24 1929    Specimen: Swab Updated: 01/05/24 1930     Rapid Influenza A Ag Negative     Rapid Influenza B Ag Negative     Internal Control Passed     Lot Number [id number]     Expiration Date 1/16/25    CBC & Differential [[id number]]  (Abnormal) Collected: 01/05/24 2051    Specimen: Blood from Arm, Right Updated: 01/05/24 2104    Narrative:      The following orders were created for panel order CBC & Differential.  Procedure                               Abnormality         Status                     ---------                               -----------         ------                     CBC Auto Differential[[id number]]        Abnormal            Final result                 Please view results for these tests on the individual orders.    Comprehensive Metabolic Panel [[id number]]  (Abnormal) Collected: 01/05/24 2051    Specimen: Blood from Arm, Right Updated: 01/05/24 2141     Glucose 118 mg/dL      BUN 16 mg/dL      Creatinine 0.87 mg/dL      Sodium  139 mmol/L      Potassium 4.3 mmol/L      Chloride 100 mmol/L      CO2 21.2 mmol/L      Calcium 10.3 mg/dL      Total Protein 8.1 g/dL      Albumin 4.8 g/dL      ALT (SGPT) 11 U/L      AST (SGOT) 15 U/L      Alkaline Phosphatase 150 U/L      Total Bilirubin 0.9 mg/dL      Globulin 3.3 gm/dL      A/G Ratio 1.5 g/dL      BUN/Creatinine Ratio 18.4     Anion Gap 17.8 mmol/L      eGFR 78.3 mL/min/1.73     Narrative:      GFR Normal >60  Chronic Kidney Disease <60  Kidney Failure <15      Single High Sensitivity Troponin T [[id number]]  (Normal) Collected: 01/05/24 2051    Specimen: Blood from Arm, Right Updated: 01/05/24 2146     HS Troponin T 8 ng/L     Narrative:      High Sensitive Troponin T Reference Range:  <14.0 ng/L- Negative Female for AMI  <22.0 ng/L- Negative Male for AMI  >=14 - Abnormal Female indicating possible myocardial injury.  >=22 - Abnormal Male indicating possible myocardial injury.   Clinicians would have to utilize clinical acumen, EKG, Troponin, and serial changes to determine if it is an Acute Myocardial Infarction or myocardial injury due to an underlying chronic condition.         Magnesium [[id number]]  (Normal) Collected: 01/05/24 2051    Specimen: Blood from Arm, Right Updated: 01/05/24 2141     Magnesium 2.1 mg/dL     CBC Auto Differential [[id number]]  (Abnormal) Collected: 01/05/24 2051    Specimen: Blood from Arm, Right Updated: 01/05/24 2104     WBC 5.78 10*3/mm3      RBC 5.04 10*6/mm3      Hemoglobin 15.9 g/dL      Hematocrit 45.9 %      MCV 91.1 fL      MCH 31.5 pg      MCHC 34.6 g/dL      RDW 12.9 %      RDW-SD 42.5 fl      MPV 8.8 fL      Platelets 257 10*3/mm3      Neutrophil % 76.1 %      Lymphocyte % 17.0 %      Monocyte % 6.7 %      Eosinophil % 0.0 %      Basophil % 0.0 %      Immature Grans % 0.2 %      Neutrophils, Absolute 4.40 10*3/mm3      Lymphocytes, Absolute 0.98 10*3/mm3      Monocytes, Absolute 0.39 10*3/mm3      Eosinophils, Absolute 0.00 10*3/mm3      Basophils,  Absolute 0.00 10*3/mm3      Immature Grans, Absolute 0.01 10*3/mm3      nRBC 0.0 /100 WBC     BNP [[id number]]  (Normal) Collected: 01/05/24 2051    Specimen: Blood from Arm, Right Updated: 01/05/24 2146     proBNP 70.8 pg/mL     Narrative:      This assay is used as an aid in the diagnosis of individuals suspected of having heart failure. It can be used as an aid in the diagnosis of acute decompensated heart failure (ADHF) in patients presenting with signs and symptoms of ADHF to the emergency department (ED). In addition, NT-proBNP of <300 pg/mL indicates ADHF is not likely.    Age Range Result Interpretation  NT-proBNP Concentration (pg/mL:      <50             Positive            >450                   Gray                 300-450                    Negative             <300    50-75           Positive            >900                  Gray                300-900                  Negative            <300      >75             Positive            >1800                  Gray                300-1800                  Negative            <300    TSH Rfx On Abnormal To Free T4 [[id number]]  (Normal) Collected: 01/05/24 2051    Specimen: Blood from Arm, Right Updated: 01/05/24 2146     TSH 0.890 uIU/mL     Urinalysis With Microscopic If Indicated (No Culture) - Urine, Clean Catch [[id number]]  (Abnormal) Collected: 01/05/24 2102    Specimen: Urine, Clean Catch Updated: 01/05/24 2150     Color, UA Dark Yellow     Appearance, UA Cloudy     pH, UA 5.5     Specific Gravity, UA 1.024     Glucose, UA Negative     Ketones, UA 40 mg/dL (2+)     Bilirubin, UA Negative     Blood, UA Negative     Protein, UA Trace     Leuk Esterase, UA Trace     Nitrite, UA Negative     Urobilinogen, UA 1.0 E.U./dL    Urinalysis, Microscopic Only - Urine, Clean Catch [[id number]]  (Abnormal) Collected: 01/05/24 2102    Specimen: Urine, Clean Catch Updated: 01/05/24 2213     RBC, UA 0-2 /HPF      WBC, UA 3-5 /HPF      Bacteria, UA Trace /HPF       Squamous Epithelial Cells, UA 21-30 /HPF      Hyaline Casts, UA 0-2 /LPF      Granular Casts, UA 0-2 /LPF      Methodology Manual Light Microscopy             Imaging:    XR Chest 1 View    Result Date: 1/5/2024  PROCEDURE: XR CHEST 1 VW  COMPARISON: [facility], CR, CHEST AP/PA 1 VIEW, 6/28/2018, 17:25.  INDICATIONS: dizziness  FINDINGS:  The heart is normal in size.  The lungs are well-expanded and free of infiltrates.  Bony structures appear intact.       No active disease is seen.       CARTACHO WILSON MD       Electronically Signed and Approved By: CATRACHO WILSON MD on 1/05/2024 at 21:26                Differential Diagnosis and Discussion:      Dizziness: Based on the patient's history, signs, and symptoms, the diffential diagnosis includes but is not limited to meningitis, stroke, sepsis, subarachnoid hemorrhage, intracranial bleeding, encephalitis, vertigo, electrolyte imbalance, and metabolic disorders.    All labs were reviewed and interpreted by me.  All X-rays impressions were independently interpreted by me.  EKG was interpreted by supervising attending.    MDM     The patient´s CBC that was reviewed and interpreted by me shows no abnormalities of critical concern. Of note, there is no anemia requiring a blood transfusion and the platelet count is acceptable.  The patient´s CMP that was reviewed and interpretted by me shows no abnormalities of critical concern. Of note, the patient´s sodium and potassium are acceptable. The patient´s liver enzymes are unremarkable. The patient´s renal function (creatinine) is preserved. The patient has a normal anion gap.  Urinalysis shows no evidence of acute UTI.  Troponin within normal limits.  BNP within normal limits.  TSH within normal limits.  Chest x-ray shows no active disease.  Patient denies chest pain or shortness of air at this time.  Given the patient's cardiac history I did a heart score which was a 3.  Indicating outpatient follow-up is  appropriate.  Patient denies chest pain or shortness of air at this time.  Patient's oxygen saturation is 98% on room air.  I will give the patient a liter of fluids and send the patient home with Zofran as she stated the Zofran took away her nausea.  Instruct patient to return to the ED if she develops any worsening symptoms.  Patient states she understands and agrees with plan of care.          Patient Care Considerations:          Consultants/Shared Management Plan:    None    Social Determinants of Health:    Patient is independent, reliable, and has access to care.       Disposition and Care Coordination:    Discharged: I considered escalation of care by admitting this patient to the hospital, however the patient has improved and is suitable and stable for discharge.    I have explained the patient´s condition, diagnoses and treatment plan based on the information available to me at this time. I have answered questions and addressed any concerns. The patient has a good  understanding of the patient´s diagnosis, condition, and treatment plan as can be expected at this point. The vital signs have been stable. The patient´s condition is stable and appropriate for discharge from the emergency department.      The patient will pursue further outpatient evaluation with the primary care physician or other designated or consulting physician as outlined in the discharge instructions. They are agreeable to this plan of care and follow-up instructions have been explained in detail. The patient has received these instructions in written format and have expressed an understanding of the discharge instructions. The patient is aware that any significant change in condition or worsening of symptoms should prompt an immediate return to this or the closest emergency department or call to 911.  I have explained discharge medications and the need for follow up with the patient/caretakers. This was also printed in the discharge  instructions. Patient was discharged with the following medications and follow up:      Medication List      No changes were made to your prescriptions during this visit.      Jose Luis Owens MD  [address] [place] [address]  [phone]    Schedule an appointment as soon as possible for a visit in 1 week  Follow-up       Final diagnoses:   Viral URI   Dizziness        ED Disposition       ED Disposition   Discharge    Condition   Stable    Comment   --               This medical record created using voice recognition software.             Rey Ramsey PA-C  01/05/24 2520

## 2024-01-16 ENCOUNTER — OFFICE VISIT (OUTPATIENT)
Dept: CARDIOLOGY | Facility: CLINIC | Age: 59
End: 2024-01-16
Payer: COMMERCIAL

## 2024-01-16 VITALS
WEIGHT: 203 LBS | SYSTOLIC BLOOD PRESSURE: 124 MMHG | HEART RATE: 72 BPM | DIASTOLIC BLOOD PRESSURE: 67 MMHG | HEIGHT: 63 IN | BODY MASS INDEX: 35.97 KG/M2

## 2024-01-16 DIAGNOSIS — R42 LIGHTHEADEDNESS: ICD-10-CM

## 2024-01-16 DIAGNOSIS — E78.2 MIXED HYPERLIPIDEMIA: ICD-10-CM

## 2024-01-16 DIAGNOSIS — I25.10 CORONARY ARTERY DISEASE INVOLVING NATIVE CORONARY ARTERY OF NATIVE HEART WITHOUT ANGINA PECTORIS: Primary | ICD-10-CM

## 2024-01-16 NOTE — ASSESSMENT & PLAN NOTE
Highly likely her recent symptoms were provoked by viral illness, but will assess LV function and for valvular issues with echocardiogram.  Instructed her to contact us if she is worsening or progressive symptoms.

## 2024-01-16 NOTE — PROGRESS NOTES
Chief Complaint  Hospital Follow Up Visit    Subjective        History of Present Illness  Chely Peoples presents to [facility] CARDIOLOGY   Ms. Peoples is a 58-year-old female patient coming in for follow-up after recent emergency room evaluation.  She initially went to the urgent care for complaints of feeling nauseated and dizzy/lightheaded for couple of days, with associated body aches and congestion.  She was tested negative for COVID and flu.  However while she was there she developed presyncopal symptoms.  States she had a few brief seconds of chest pain while she felt like she was close to passing out.  Urgent care sent her to the ER for further evaluation.  She had negative troponin level, and no significant changes on EKG.  Her symptoms improved after dose of Zofran.  States since being home she has had some ongoing mild symptoms of lightheadedness, also very mild symptoms of shortness of breath.  However she is tolerating regular activity without any significant issues.  She is not experiencing any episodes chest pain or pressure, palpitations.  Reports her blood pressure has been stable.      Past History:     (1) Coronary artery disease, index presentation being non-STEMI on 06/29/2018 followed by angioplasty and stent placement in the right coronary artery. She was also noted to have a 60% stenosis involving the mid left anterior descending artery with calcification. (2) Hyperlipidemia. (3) Hypothyroidism. (4) Negative for diabetes mellitus.    Past Medical History:   Diagnosis Date    Anxiety     Anxiety and depression     BMI 29.0-29.9,adult     CAD (coronary artery disease)     COPD (chronic obstructive pulmonary disease)     Depression     Depression     History of anemia     History of suicide attempt     History of viral gastroenteritis     Hypothyroidism     Suicide attempt        Allergies   Allergen Reactions    Diazepam Other (See Comments) and Unknown (See Comments)      "Used for procedure and made patient \"crazy\" while teenager    Tetracycline Hives    Tetracyclines & Related Rash        Past Surgical History:   Procedure Laterality Date    APPENDECTOMY       SECTION      COLONOSCOPY      CORONARY STENT PLACEMENT  2018    HYSTERECTOMY      Not due to cancer    OOPHORECTOMY      TONSILLECTOMY          Social History  She  reports that she quit smoking about 2 years ago. Her smoking use included cigarettes. She has a 39.00 pack-year smoking history. She has never used smokeless tobacco. She reports current alcohol use. She reports that she does not use drugs.    Family History  Her family history includes COPD in her mother; Heart disease in her father and another family member; Suicide Attempts in her nephew.       Current Outpatient Medications on File Prior to Visit   Medication Sig    aspirin 81 MG EC tablet Take 1 tablet by mouth Daily. Whenever she remembers to take it    atorvastatin (LIPITOR) 40 MG tablet Take 1 tablet by mouth every night at bedtime.    OLANZapine (zyPREXA) 2.5 MG tablet Take 1 tablet by mouth Daily.    propranolol (INDERAL) 10 MG tablet Take 1 tablet by mouth 2 (Two) Times a Day As Needed. for anxiety    Synthroid 112 MCG tablet Take 1 tablet by mouth Daily.    vilazodone (VIIBRYD) 40 MG tablet tablet Take 1 tablet by mouth Daily.    QUEtiapine (SEROquel) 100 MG tablet Take 1 tablet by mouth Every Night.     No current facility-administered medications on file prior to visit.         Review of Systems   Constitutional:  Positive for fatigue.   Respiratory:  Negative for cough, chest tightness and shortness of breath.    Cardiovascular:  Negative for chest pain, palpitations and leg swelling.   Gastrointestinal:  Negative for nausea and vomiting.   Neurological:  Positive for light-headedness. Negative for syncope.        Objective   Vitals:    24 1130   BP: 124/67   Pulse: 72   Weight: 92.1 kg (203 lb)   Height: 160 cm (63\")         Physical " "Exam  General : Alert, awake, no acute distress  Neck : Supple, no carotid bruit, no jugular venous distention  CVS : Regular rate and rhythm, no murmur, rubs or gallops  Lungs: Clear to auscultation bilaterally, no crackles or rhonchi  Abdomen: Soft, nontender, bowel sounds active  Extremities: Warm, well-perfused, no pedal edema      Result Review     The following data was reviewed by LOLITA Acuna  proBNP   Date Value Ref Range Status   01/05/2024 70.8 0.0 - 900.0 pg/mL Final     CMP          5/16/2023    13:51 8/8/2023    17:24 1/5/2024    20:51   CMP   Glucose  101  118    BUN  11  16    Creatinine  0.80  0.87    EGFR  86.6  78.3    Sodium  136  139    Potassium  3.7  4.3    Chloride  98  100    Calcium  10.1  10.3    Total Protein  7.1  8.1    Albumin  4.3  4.8    Globulin  2.8  3.3    Total Bilirubin  0.4  0.9    Alkaline Phosphatase 163  130  150    AST (SGOT)  15  15    ALT (SGPT)  12  11    Albumin/Globulin Ratio  1.5  1.5    BUN/Creatinine Ratio  13.8  18.4    Anion Gap  11.5  17.8      CBC w/diff          8/8/2023    17:24 8/30/2023    10:12 1/5/2024    20:51   CBC w/Diff   WBC 6.92  5.87  5.78    RBC 4.60  4.10  5.04    Hemoglobin 14.7  13.0  15.9    Hematocrit 42.4  39.8  45.9    MCV 92.2  97.1  91.1    MCH 32.0  31.7  31.5    MCHC 34.7  32.7  34.6    RDW 13.4  13.9  12.9    Platelets 261  282  257    Neutrophil Rel % 68.5   76.1    Immature Granulocyte Rel % 0.1   0.2    Lymphocyte Rel % 23.4   17.0    Monocyte Rel % 7.9   6.7    Eosinophil Rel % 0.0   0.0    Basophil Rel % 0.1   0.0       Lab Results   Component Value Date    TSH 0.890 01/05/2024      Lab Results   Component Value Date    FREET4 1.05 08/30/2023      No results found for: \"DDIMERQUANT\"  Magnesium   Date Value Ref Range Status   01/05/2024 2.1 1.6 - 2.6 mg/dL Final      No results found for: \"DIGOXIN\"   Lab Results   Component Value Date    TROPONINT 8 01/05/2024           Lipid Panel          3/15/2023    17:23   Lipid Panel "   Total Cholesterol 108    Triglycerides 80    HDL Cholesterol 34    VLDL Cholesterol 16    LDL Cholesterol  58    LDL/HDL Ratio 1.71          Echocardiogram 6/29/ 2018         Assessment and Plan   Diagnoses and all orders for this visit:    1. Coronary artery disease involving native coronary artery of native heart without angina pectoris (Primary)  Assessment & Plan:  Stable without symptoms of angina.  Continue daily aspirin, and statin therapy.    Orders:  -     Adult Transthoracic Echo Complete W/ Cont if Necessary Per Protocol; Future    2. Mixed hyperlipidemia  Assessment & Plan:  Most recent LDL is below goal at 58.  Continue current dose atorvastatin 40 mg nightly.      3. Lightheadedness  Assessment & Plan:  Highly likely her recent symptoms were provoked by viral illness, but will assess LV function and for valvular issues with echocardiogram.  Instructed her to contact us if she is worsening or progressive symptoms.    Orders:  -     Adult Transthoracic Echo Complete W/ Cont if Necessary Per Protocol; Future                Follow Up   No follow-ups on file.    Patient was given instructions and counseling regarding her condition or for health maintenance advice. Please see specific information pulled into the AVS if appropriate.     Signed,  Cheryl Monterroso, APRN  01/16/2024     Dictated Utilizing Dragon Dictation: Please note that portions of this note were completed with a voice recognition program.  Part of this note may be an electronic transcription/translation of spoken language to printed text using the Dragon Dictation System.

## 2024-01-30 ENCOUNTER — TELEPHONE (OUTPATIENT)
Dept: CARDIOLOGY | Facility: CLINIC | Age: 59
End: 2024-01-30
Payer: COMMERCIAL

## 2024-01-30 NOTE — TELEPHONE ENCOUNTER
----- Message from LOLITA Acuna sent at 1/29/2024  7:38 PM EST -----  Echocardiogram shows normal LV function with EF of 61 to 65%, and no significant valvular abnormalities.  From a cardiac standpoint no treatment changes are recommended, unless she has new or concerning symptoms keep her previously scheduled routine follow-up appointment.

## 2024-02-15 DIAGNOSIS — E03.9 HYPOTHYROIDISM, UNSPECIFIED TYPE: ICD-10-CM

## 2024-02-19 ENCOUNTER — LAB (OUTPATIENT)
Dept: LAB | Facility: HOSPITAL | Age: 59
End: 2024-02-19
Payer: COMMERCIAL

## 2024-02-19 ENCOUNTER — OFFICE VISIT (OUTPATIENT)
Dept: FAMILY MEDICINE CLINIC | Age: 59
End: 2024-02-19
Payer: COMMERCIAL

## 2024-02-19 VITALS
SYSTOLIC BLOOD PRESSURE: 116 MMHG | HEART RATE: 83 BPM | WEIGHT: 200 LBS | BODY MASS INDEX: 35.44 KG/M2 | HEIGHT: 63 IN | OXYGEN SATURATION: 92 % | DIASTOLIC BLOOD PRESSURE: 79 MMHG

## 2024-02-19 DIAGNOSIS — I25.10 CORONARY ARTERY DISEASE INVOLVING NATIVE CORONARY ARTERY OF NATIVE HEART WITHOUT ANGINA PECTORIS: ICD-10-CM

## 2024-02-19 DIAGNOSIS — E03.9 HYPOTHYROIDISM, UNSPECIFIED TYPE: ICD-10-CM

## 2024-02-19 DIAGNOSIS — R73.09 ELEVATED GLUCOSE: ICD-10-CM

## 2024-02-19 DIAGNOSIS — Z13.6 SCREENING FOR CARDIOVASCULAR CONDITION: ICD-10-CM

## 2024-02-19 DIAGNOSIS — M79.604 BILATERAL LEG PAIN: ICD-10-CM

## 2024-02-19 DIAGNOSIS — M79.605 BILATERAL LEG PAIN: ICD-10-CM

## 2024-02-19 DIAGNOSIS — B35.1 NAIL FUNGUS: ICD-10-CM

## 2024-02-19 DIAGNOSIS — M79.605 PAIN OF LEFT LOWER EXTREMITY: ICD-10-CM

## 2024-02-19 DIAGNOSIS — E03.9 HYPOTHYROIDISM, UNSPECIFIED TYPE: Primary | ICD-10-CM

## 2024-02-19 LAB
ALBUMIN SERPL-MCNC: 4.8 G/DL (ref 3.5–5.2)
ALBUMIN/GLOB SERPL: 2.5 G/DL
ALP SERPL-CCNC: 128 U/L (ref 39–117)
ALT SERPL W P-5'-P-CCNC: 25 U/L (ref 1–33)
ANION GAP SERPL CALCULATED.3IONS-SCNC: 7 MMOL/L (ref 5–15)
AST SERPL-CCNC: 23 U/L (ref 1–32)
BILIRUB SERPL-MCNC: 0.6 MG/DL (ref 0–1.2)
BUN SERPL-MCNC: 14 MG/DL (ref 6–20)
BUN/CREAT SERPL: 14.3 (ref 7–25)
CALCIUM SPEC-SCNC: 9.9 MG/DL (ref 8.6–10.5)
CHLORIDE SERPL-SCNC: 108 MMOL/L (ref 98–107)
CHOLEST SERPL-MCNC: 138 MG/DL (ref 0–200)
CO2 SERPL-SCNC: 27 MMOL/L (ref 22–29)
CREAT SERPL-MCNC: 0.98 MG/DL (ref 0.57–1)
EGFRCR SERPLBLD CKD-EPI 2021: 67.9 ML/MIN/1.73
GLOBULIN UR ELPH-MCNC: 1.9 GM/DL
GLUCOSE SERPL-MCNC: 97 MG/DL (ref 65–99)
HBA1C MFR BLD: 5.5 % (ref 4.8–5.6)
HDLC SERPL-MCNC: 47 MG/DL (ref 40–60)
LDLC SERPL CALC-MCNC: 75 MG/DL (ref 0–100)
LDLC/HDLC SERPL: 1.57 {RATIO}
POTASSIUM SERPL-SCNC: 4.4 MMOL/L (ref 3.5–5.2)
PROT SERPL-MCNC: 6.7 G/DL (ref 6–8.5)
SODIUM SERPL-SCNC: 142 MMOL/L (ref 136–145)
TRIGL SERPL-MCNC: 85 MG/DL (ref 0–150)
TSH SERPL DL<=0.05 MIU/L-ACNC: 3.14 UIU/ML (ref 0.27–4.2)
VLDLC SERPL-MCNC: 16 MG/DL (ref 5–40)

## 2024-02-19 PROCEDURE — 99214 OFFICE O/P EST MOD 30 MIN: CPT | Performed by: NURSE PRACTITIONER

## 2024-02-19 PROCEDURE — 80061 LIPID PANEL: CPT

## 2024-02-19 PROCEDURE — 36415 COLL VENOUS BLD VENIPUNCTURE: CPT

## 2024-02-19 PROCEDURE — 84443 ASSAY THYROID STIM HORMONE: CPT

## 2024-02-19 PROCEDURE — 80053 COMPREHEN METABOLIC PANEL: CPT

## 2024-02-19 PROCEDURE — 1160F RVW MEDS BY RX/DR IN RCRD: CPT | Performed by: NURSE PRACTITIONER

## 2024-02-19 PROCEDURE — 1159F MED LIST DOCD IN RCRD: CPT | Performed by: NURSE PRACTITIONER

## 2024-02-19 PROCEDURE — 83036 HEMOGLOBIN GLYCOSYLATED A1C: CPT

## 2024-02-19 RX ORDER — LAMOTRIGINE 100 MG/1
100 TABLET ORAL DAILY
COMMUNITY
Start: 2024-01-16

## 2024-02-19 NOTE — PROGRESS NOTES
Chief Complaint  Chely Peoples presents to [facility] for Hypothyroidism      Subjective     History of Present Illness    Since last visit with me ( 8/8/2023): hyppothyroidism requested referral to gerri, leg pain, B12 deficiency - increased medication for her thyroid, referred to gerri;  ordered NANO for her leg pain and ck; mammogram ordered and CT chest  - appt with Dr. Cha scheduled ;  she no showed for her NANO appt   >8/29/23 - LOLITA Acuna, cardiology no changes with medicatino and recommended continue ASA and statin therapy  >8/30/23 - LOLITA Dailey acute visit for dental abscess follow up and recommendations regarding therapy for dental extraction on ASA   >1/16/24 - LOLITA Acuna, cardiology   follow up after ER visit for near syncopal episode - echo ordered and suspected r/t viral illness  (no further episodes)       Darshana presents for follow up on hypothyroidism.   Has not been taking medication as prescribed.    Medication includes :  levothyroxine (Synthroid) 112 mcg  Current symptoms include:  fatigue, weight gain, depression.   Last   Lab Results   Component Value Date    TSH 3.140 02/19/2024   She did not follow up with Dr. Cha as requested /ordered due to her unstable thyroid levels.  She wants a new referral     Darshana presents today for follow up on hyperlipidemia.  Previous values:   Lab Results   Component Value Date    CHOL 138 02/19/2024    CHLPL 132 07/22/2021    TRIG 85 02/19/2024    HDL 47 02/19/2024    LDL 75 02/19/2024    ;    Darshana reports compliant with medication which is atorvastatin (lipitor)    No side effects reported from this medication .   No new concerns to discuss today.  The ASCVD Risk score (Indra KIM, et al., 2019) failed to calculate for the following reasons:    The patient has a prior MI or stroke diagnosis  This is managed by cardiology       Her mental health is managed by  Carlo Jordan,  LOLITA. She will follow up one time per month then every 2 months .  She does not have a therapist at this time but does not feel like needs one.    She feels that she is doing well other than with the weather.  She is staying busy - going to school, working     Has fungal nail infection needing more topical treatment     She has been having bilateral leg pain with ambulation.  She states she does not have the ability to walk much as previously due to the pain. She will avoid going places that require much ambulation     Assessment and Plan       Diagnoses and all orders for this visit:    1. Hypothyroidism, unspecified type (Primary)  Comments:  continue treatment  labs and follow up based on results  Orders:  -     TSH Rfx On Abnormal To Free T4; Future  -     Ambulatory Referral to Endocrinology    2. Elevated glucose  Comments:  checking A1C  Orders:  -     Comprehensive metabolic panel; Future  -     Hemoglobin A1c; Future    3. Coronary artery disease involving native coronary artery of native heart without angina pectoris  Comments:  follow up with cardiology as advised continue current treatment  Orders:  -     Cancel: Doppler Ankle Brachial Index Single Level CAR; Future    4. Pain of left lower extremity  -     Cancel: Doppler Ankle Brachial Index Single Level CAR; Future  -     Doppler Ankle Brachial Index Single Level CAR; Future    5. Bilateral leg pain  Comments:  will check NANO  Orders:  -     Cancel: Doppler Ankle Brachial Index Single Level CAR; Future  -     Doppler Ankle Brachial Index Single Level CAR; Future    6. Nail fungus  -     ciclopirox (Penlac) 8 % solution; Apply  topically to the appropriate area as directed Every Night.  Dispense: 6 mL; Refill: 1    7. Screening for cardiovascular condition  -     Lipid panel; Future            Follow Up   No follow-ups on file.  Future Appointments   Date Time Provider Department Center   3/1/2024  3:00 PM Tevin Cadet DO [facility]  "  8/23/2024  9:15 AM Nusrat Malcolm [place] [facility]   9/4/2024  2:45 PM Jose Luis Owens [place] [facility]       New Medications Ordered This Visit   Medications    ciclopirox (Penlac) 8 % solution     Sig: Apply  topically to the appropriate area as directed Every Night.     Dispense:  6 mL     Refill:  1       Medications Discontinued During This Encounter   Medication Reason    QUEtiapine (SEROquel) 100 MG tablet Discontinued by another clinician          Review of Systems    Objective     Vitals:    02/19/24 0850   BP: 116/79   BP Location: Left arm   Patient Position: Sitting   Cuff Size: Large Adult   Pulse: 83   SpO2: 92%   Weight: 90.7 kg (200 lb)   Height: 160 cm (62.99\")     Body mass index is 35.44 kg/m².         Physical Exam  Constitutional:       General: She is not in acute distress.     Appearance: Normal appearance.   HENT:      Head: Normocephalic.   Cardiovascular:      Rate and Rhythm: Normal rate and regular rhythm.      Pulses:           Dorsalis pedis pulses are 1+ on the right side and 1+ on the left side.   Pulmonary:      Effort: Pulmonary effort is normal.      Breath sounds: Normal breath sounds.   Musculoskeletal:         General: Normal range of motion.   Feet:      Right foot:      Toenail Condition: Fungal disease present.     Left foot:      Toenail Condition: Fungal disease present.  Neurological:      General: No focal deficit present.      Mental Status: She is alert and oriented to person, place, and time.   Psychiatric:         Mood and Affect: Mood normal.         Behavior: Behavior normal.            Result Review               Allergies   Allergen Reactions    Diazepam Other (See Comments) and Unknown (See Comments)     Used for procedure and made patient \"crazy\" while teenager    Tetracycline Hives    Tetracyclines & Related Rash      Past Medical History:   Diagnosis Date    Anxiety     Anxiety and depression     BMI 29.0-29.9,adult     CAD (coronary artery " disease)     COPD (chronic obstructive pulmonary disease)     Depression     Depression     History of anemia     History of suicide attempt     History of viral gastroenteritis     Hypothyroidism     Suicide attempt      Current Outpatient Medications   Medication Sig Dispense Refill    aspirin 81 MG EC tablet Take 1 tablet by mouth Daily. Whenever she remembers to take it      atorvastatin (LIPITOR) 40 MG tablet Take 1 tablet by mouth every night at bedtime. 90 tablet 3    lamoTRIgine (LaMICtal) 100 MG tablet Take 1 tablet by mouth Daily.      OLANZapine (zyPREXA) 2.5 MG tablet Take 2 tablets by mouth Daily.      propranolol (INDERAL) 10 MG tablet Take 1 tablet by mouth 2 (Two) Times a Day As Needed. for anxiety      vilazodone (VIIBRYD) 40 MG tablet tablet Take 1 tablet by mouth Daily.      ciclopirox (Penlac) 8 % solution Apply  topically to the appropriate area as directed Every Night. 6 mL 1    Synthroid 112 MCG tablet TAKE 1 TABLET BY MOUTH DAILY 30 tablet 5     No current facility-administered medications for this visit.     Past Surgical History:   Procedure Laterality Date    APPENDECTOMY       SECTION      COLONOSCOPY      CORONARY STENT PLACEMENT      HYSTERECTOMY      Not due to cancer    OOPHORECTOMY      TONSILLECTOMY        Health Maintenance Due   Topic Date Due    ZOSTER VACCINE (1 of 2) Never done    LUNG CANCER SCREENING  Never done    ANNUAL PHYSICAL  2021    BMI FOLLOWUP  2023    INFLUENZA VACCINE  2023    COVID-19 Vaccine ( season) 2023      Immunization History   Administered Date(s) Administered    COVID-19 (PFIZER) Purple Cap Monovalent 2021, 2021, 2022    Flublok 18+yrs 2022    Fluzone (or Fluarix & Flulaval for VFC) >6mos 10/15/2019, 2022    Hepatitis A 2018    Pneumococcal Polysaccharide (PPSV23) 2020    Tdap 2020         Part of this note may be an electronic transcription/translation of  spoken language to printed   text using the Dragon Dictation System.      LOLITA Dailey

## 2024-02-20 RX ORDER — LEVOTHYROXINE SODIUM 112 MCG
112 TABLET ORAL DAILY
Qty: 30 TABLET | Refills: 5 | Status: SHIPPED | OUTPATIENT
Start: 2024-02-20

## 2024-02-21 ENCOUNTER — PRIOR AUTHORIZATION (OUTPATIENT)
Dept: FAMILY MEDICINE CLINIC | Age: 59
End: 2024-02-21
Payer: COMMERCIAL

## 2024-02-27 DIAGNOSIS — E78.2 MIXED HYPERLIPIDEMIA: ICD-10-CM

## 2024-02-27 RX ORDER — VILAZODONE HYDROCHLORIDE 20 MG/1
20 TABLET ORAL DAILY
Qty: 30 TABLET | Refills: 0 | OUTPATIENT
Start: 2024-02-27

## 2024-02-27 RX ORDER — ATORVASTATIN CALCIUM 40 MG/1
40 TABLET, FILM COATED ORAL
Qty: 90 TABLET | Refills: 3 | OUTPATIENT
Start: 2024-02-27

## 2024-03-01 ENCOUNTER — OFFICE VISIT (OUTPATIENT)
Dept: ENDOCRINOLOGY | Age: 59
End: 2024-03-01
Payer: COMMERCIAL

## 2024-03-01 VITALS
BODY MASS INDEX: 36.18 KG/M2 | TEMPERATURE: 96.8 F | SYSTOLIC BLOOD PRESSURE: 134 MMHG | HEIGHT: 63 IN | WEIGHT: 204.2 LBS | HEART RATE: 65 BPM | OXYGEN SATURATION: 98 % | DIASTOLIC BLOOD PRESSURE: 84 MMHG

## 2024-03-01 DIAGNOSIS — E03.9 HYPOTHYROIDISM, UNSPECIFIED TYPE: Primary | ICD-10-CM

## 2024-03-01 PROCEDURE — 99203 OFFICE O/P NEW LOW 30 MIN: CPT | Performed by: INTERNAL MEDICINE

## 2024-03-01 PROCEDURE — 1159F MED LIST DOCD IN RCRD: CPT | Performed by: INTERNAL MEDICINE

## 2024-03-01 PROCEDURE — 1160F RVW MEDS BY RX/DR IN RCRD: CPT | Performed by: INTERNAL MEDICINE

## 2024-03-01 RX ORDER — IBUPROFEN 600 MG/1
TABLET ORAL
COMMUNITY
Start: 2024-02-27

## 2024-03-01 NOTE — PROGRESS NOTES
Referring provider: LOLITA Dailey     Chief complaint/Reason for consult:  Hypothyroidism    HPI:   - 58 year old female here for hypothyroidism  - Is currently on Synthroid 112 mcg daily  - Denies missing any does of her levothyroxine  - She takes her levothyroxine at the same time every day, on an empty stomach, and not with hot beverages  - She denies fatigue, constipation, edema    The following portions of the patient's history were reviewed and updated as appropriate: allergies, current medications, past family history, past medical history, past social history, past surgical history, and problem list.    Objective     Vitals:    03/01/24 1503   BP: 134/84   Pulse: 65   Temp: 96.8 °F (36 °C)   SpO2: 98%        Physical Exam  Constitutional:       Appearance: Normal appearance.   HENT:      Head: Normocephalic and atraumatic.   Eyes:      General: No scleral icterus.  Neck:      Thyroid: No thyroid mass, thyromegaly or thyroid tenderness.   Pulmonary:      Effort: Pulmonary effort is normal. No respiratory distress.   Neurological:      Mental Status: She is alert.      Gait: Gait normal.   Psychiatric:         Mood and Affect: Mood normal.         Behavior: Behavior normal.         Thought Content: Thought content normal.         Judgment: Judgment normal.     Labs/Imaging:  TSH and free T4 were normal in 2/2024    Assessment & Plan   Hypothyroidism  - Cont. Synthroid 112 mcg daily    - Return to clinic in 6 months

## 2024-03-14 DIAGNOSIS — E03.9 HYPOTHYROIDISM, UNSPECIFIED TYPE: ICD-10-CM

## 2024-03-14 RX ORDER — LEVOTHYROXINE SODIUM 100 MCG
100 TABLET ORAL DAILY
Qty: 30 TABLET | Refills: 5 | OUTPATIENT
Start: 2024-03-14

## 2024-03-26 ENCOUNTER — HOSPITAL ENCOUNTER (OUTPATIENT)
Dept: CARDIOLOGY | Facility: HOSPITAL | Age: 59
Discharge: HOME | End: 2024-03-26
Admitting: NURSE PRACTITIONER
Payer: COMMERCIAL

## 2024-03-26 DIAGNOSIS — M79.605 PAIN OF LEFT LOWER EXTREMITY: ICD-10-CM

## 2024-03-26 DIAGNOSIS — M79.605 BILATERAL LEG PAIN: ICD-10-CM

## 2024-03-26 DIAGNOSIS — M79.604 BILATERAL LEG PAIN: ICD-10-CM

## 2024-03-26 PROCEDURE — 93922 UPR/L XTREMITY ART 2 LEVELS: CPT

## 2024-03-27 LAB
BH CV LOWER ARTERIAL LEFT ABI RATIO: 1.18
BH CV LOWER ARTERIAL LEFT DORSALIS PEDIS SYS MAX: 130
BH CV LOWER ARTERIAL LEFT GREAT TOE SYS MAX: 151
BH CV LOWER ARTERIAL LEFT POST TIBIAL SYS MAX: 162
BH CV LOWER ARTERIAL LEFT TBI RATIO: 1.1
BH CV LOWER ARTERIAL RIGHT ABI RATIO: 1.2
BH CV LOWER ARTERIAL RIGHT DORSALIS PEDIS SYS MAX: 152
BH CV LOWER ARTERIAL RIGHT GREAT TOE SYS MAX: 140
BH CV LOWER ARTERIAL RIGHT POST TIBIAL SYS MAX: 164
BH CV LOWER ARTERIAL RIGHT TBI RATIO: 1.02
UPPER ARTERIAL LEFT ARM BRACHIAL SYS MAX: 129
UPPER ARTERIAL RIGHT ARM BRACHIAL SYS MAX: 137

## 2024-04-01 ENCOUNTER — TELEPHONE (OUTPATIENT)
Dept: FAMILY MEDICINE CLINIC | Age: 59
End: 2024-04-01
Payer: COMMERCIAL

## 2024-04-01 NOTE — TELEPHONE ENCOUNTER
"  Caller: Chely Peoples \"Darshana\"    Relationship: Self    Best call back number: [phone]     What is the best time to reach you: ANYTIME     Who are you requesting to speak with (clinical staff, provider,  specific staff member): CLINICAL     What was the call regarding: PATIENT IS CALLING RETURNING A PHONE CALL, TO CHIQUITA IN REGARDS TO TEST RESULTS.   "

## 2024-04-03 ENCOUNTER — OFFICE VISIT (OUTPATIENT)
Dept: FAMILY MEDICINE CLINIC | Age: 59
End: 2024-04-03
Payer: COMMERCIAL

## 2024-04-03 ENCOUNTER — HOSPITAL ENCOUNTER (OUTPATIENT)
Dept: GENERAL RADIOLOGY | Facility: HOSPITAL | Age: 59
Discharge: HOME | End: 2024-04-03
Admitting: NURSE PRACTITIONER
Payer: COMMERCIAL

## 2024-04-03 VITALS
DIASTOLIC BLOOD PRESSURE: 57 MMHG | HEIGHT: 63 IN | TEMPERATURE: 97.6 F | WEIGHT: 206.8 LBS | HEART RATE: 73 BPM | BODY MASS INDEX: 36.64 KG/M2 | OXYGEN SATURATION: 96 % | SYSTOLIC BLOOD PRESSURE: 115 MMHG

## 2024-04-03 DIAGNOSIS — M79.605 BILATERAL LEG PAIN: Primary | ICD-10-CM

## 2024-04-03 DIAGNOSIS — Z12.11 SCREENING FOR COLON CANCER: ICD-10-CM

## 2024-04-03 DIAGNOSIS — M79.605 PAIN OF LEFT LOWER EXTREMITY: ICD-10-CM

## 2024-04-03 DIAGNOSIS — G89.29 CHRONIC BILATERAL LOW BACK PAIN, UNSPECIFIED WHETHER SCIATICA PRESENT: ICD-10-CM

## 2024-04-03 DIAGNOSIS — M79.605 BILATERAL LEG PAIN: ICD-10-CM

## 2024-04-03 DIAGNOSIS — M79.604 BILATERAL LEG PAIN: Primary | ICD-10-CM

## 2024-04-03 DIAGNOSIS — M79.604 BILATERAL LEG PAIN: ICD-10-CM

## 2024-04-03 DIAGNOSIS — T14.91XA SUICIDE ATTEMPT: ICD-10-CM

## 2024-04-03 DIAGNOSIS — M54.50 CHRONIC BILATERAL LOW BACK PAIN, UNSPECIFIED WHETHER SCIATICA PRESENT: ICD-10-CM

## 2024-04-03 DIAGNOSIS — K57.90 DIVERTICULOSIS: ICD-10-CM

## 2024-04-03 DIAGNOSIS — E66.01 CLASS 2 SEVERE OBESITY DUE TO EXCESS CALORIES WITH SERIOUS COMORBIDITY AND BODY MASS INDEX (BMI) OF 36.0 TO 36.9 IN ADULT: ICD-10-CM

## 2024-04-03 PROCEDURE — 72100 X-RAY EXAM L-S SPINE 2/3 VWS: CPT

## 2024-04-03 RX ORDER — SEMAGLUTIDE 0.25 MG/.5ML
0.25 INJECTION, SOLUTION SUBCUTANEOUS WEEKLY
Qty: 2 ML | Refills: 0 | Status: SHIPPED | OUTPATIENT
Start: 2024-04-03

## 2024-04-03 NOTE — PROGRESS NOTES
"Chief Complaint  Chely Peoples presents to [facility] for Abdominal Pain (LLQ abdominal pain last week for one day. /Patient states there is still a \"gurgling\" feeling in her stomach. ) and Obesity (Patient wanting to discuss Wegovy medication for weight loss. )      Subjective     History of Present Illness    Follow up on leg pain.  Recently had NANO and showed no concerns for vascular cause.  We discussed may need to check her low back.  She does report that her back is hurting as ar her legs , knees and hips   She thinks this may be due to her weight.  She did contact her insurance and will require a PA    Abdominal Pain: Patient complains of abdominal pain.   Onset was 1 week ago.  Hit acutely for about 5-6 hours  and has been improve   The pain is described as aching, and is 8/10 in intensity for the 6 hours   Pain is located in the LLQ without radiation.  Aggravating factors: eating.    Alleviating factors: time.   Associated symptoms: none.    Treatments / evaluations to date include:  none  The patient denies none.  Per CT from 11/2022- she does have presence of diverticulosis and no known history of diverticulitis.  Her last colonoscopy was 2010 ( documentation erroneously noted it to be in 2020 and correction has been requested)      Assessment and Plan   Discussed risks associated with use of GLP1 including medullary thyroid cancer, pancreatitis / pancreatic cancer, gi side effects including N/V/D or constipation     Diagnoses and all orders for this visit:    1. Bilateral leg pain (Primary)  Comments:  will proceed with low back work up for her complaints and again would advise of weight loss  Orders:  -     XR Spine Lumbar 2 or 3 View; Future    2. Pain of left lower extremity  -     XR Spine Lumbar 2 or 3 View; Future    3. Chronic bilateral low back pain, unspecified whether sciatica present  -     XR Spine Lumbar 2 or 3 View; Future    4. " Diverticulosis  Comments:  suspect recent episode was a self limited episode of diverticulitis. advise in future , consider evaluation - discussed potential complications ,prevention  Orders:  -     Ambulatory Referral to Gastroenterology    5. Class 2 severe obesity due to excess calories with serious comorbidity and body mass index (BMI) of 36.0 to 36.9 in adult  Comments:  chronic conditions may improve with weight loss including HLD, low back pain , knee pain - will try to get Wegovy approved  Orders:  -     Semaglutide-Weight Management (Wegovy) 0.25 MG/0.5ML solution auto-injector; Inject 0.5 mL under the skin into the appropriate area as directed 1 (One) Time Per Week.  Dispense: 2 mL; Refill: 0    6. Screening for colon cancer  -     Ambulatory Referral to Gastroenterology    7. Suicide attempt  Comments:  advised that GLP1 may cause mood disturbance  stop and to ER if SE        I spent 45 minutes caring for Chely on this date of service. This time includes time spent by me in the following activities:reviewing tests, obtaining and/or reviewing a separately obtained history, performing a medically appropriate examination and/or evaluation , counseling and educating the patient/family/caregiver, ordering medications, tests, or procedures, and documenting information in the medical record    Follow Up   Return in about 8 weeks (around 5/29/2024) for Recheck.  Future Appointments   Date Time Provider Department Center   8/23/2024  9:15 AM Nusrat Malcolm APRN [facility] PC [facility]   9/4/2024 11:30 AM Tevin Cadet DO [facility] EN  LILY   9/4/2024  2:45 PM Jose Luis Owens MD [facility] CD [facility]       New Medications Ordered This Visit   Medications    Semaglutide-Weight Management (Wegovy) 0.25 MG/0.5ML solution auto-injector     Sig: Inject 0.5 mL under the skin into the appropriate area as directed 1 (One) Time Per Week.     Dispense:  2 mL     Refill:  0       Medications Discontinued During This Encounter  "  Medication Reason    ibuprofen (ADVIL,MOTRIN) 600 MG tablet *Therapy completed          Review of Systems    Objective     Vitals:    04/03/24 1045   BP: 115/57   BP Location: Right arm   Patient Position: Sitting   Cuff Size: Adult   Pulse: 73   Temp: 97.6 °F (36.4 °C)   TempSrc: Oral   SpO2: 96%   Weight: 93.8 kg (206 lb 12.8 oz)   Height: 160 cm (62.99\")     Body mass index is 36.64 kg/m².   Class 2 Severe Obesity (BMI >=35 and <=39.9). Obesity-related health conditions include the following: dyslipidemias. Obesity is worsening. BMI is is above average; BMI management plan is completed. We discussed low calorie, low carb based diet program, portion control, increasing exercise, and desiring wegovy .      Physical Exam  Constitutional:       General: She is not in acute distress.     Appearance: Normal appearance.   HENT:      Head: Normocephalic.   Cardiovascular:      Rate and Rhythm: Normal rate and regular rhythm.   Pulmonary:      Effort: Pulmonary effort is normal.      Breath sounds: Normal breath sounds.   Abdominal:      Tenderness: There is no abdominal tenderness.   Musculoskeletal:         General: Normal range of motion.   Neurological:      General: No focal deficit present.      Mental Status: She is alert and oriented to person, place, and time.   Psychiatric:         Mood and Affect: Mood normal.         Behavior: Behavior normal.            Result Review   The following data was reviewed by: LOLITA Dailey on 04/03/2024:  CT Abdomen Pelvis Without Contrast (11/22/2022 14:33)           Allergies   Allergen Reactions    Diazepam Other (See Comments) and Unknown (See Comments)     Used for procedure and made patient \"crazy\" while teenager    Tetracycline Hives    Tetracyclines & Related Rash      Past Medical History:   Diagnosis Date    Anxiety     Anxiety and depression     BMI 29.0-29.9,adult     CAD (coronary artery disease)     COPD (chronic obstructive pulmonary disease)     Depression "     Depression     History of anemia     History of suicide attempt     History of viral gastroenteritis     Hypothyroidism     Suicide attempt      Current Outpatient Medications   Medication Sig Dispense Refill    aspirin 81 MG EC tablet Take 1 tablet by mouth Daily. Whenever she remembers to take it      atorvastatin (LIPITOR) 40 MG tablet Take 1 tablet by mouth every night at bedtime. 90 tablet 3    ciclopirox (Penlac) 8 % solution Apply  topically to the appropriate area as directed Every Night. 6 mL 1    lamoTRIgine (LaMICtal) 100 MG tablet Take 1 tablet by mouth Daily.      OLANZapine (zyPREXA) 2.5 MG tablet Take 2 tablets by mouth Daily.      propranolol (INDERAL) 10 MG tablet Take 1 tablet by mouth 2 (Two) Times a Day As Needed. for anxiety      Synthroid 112 MCG tablet TAKE 1 TABLET BY MOUTH DAILY 30 tablet 5    vilazodone (VIIBRYD) 40 MG tablet tablet Take 1 tablet by mouth Daily.      Semaglutide-Weight Management (Wegovy) 0.25 MG/0.5ML solution auto-injector Inject 0.5 mL under the skin into the appropriate area as directed 1 (One) Time Per Week. 2 mL 0     No current facility-administered medications for this visit.     Past Surgical History:   Procedure Laterality Date    APPENDECTOMY       SECTION      COLONOSCOPY      CORONARY STENT PLACEMENT      HYSTERECTOMY      Not due to cancer    OOPHORECTOMY      TONSILLECTOMY        Health Maintenance Due   Topic Date Due    ZOSTER VACCINE (1 of 2) Never done    LUNG CANCER SCREENING  Never done    ANNUAL PHYSICAL  2021      Immunization History   Administered Date(s) Administered    COVID-19 (PFIZER) Purple Cap Monovalent 2021, 2021, 2022    Flublok 18+yrs 2022    Fluzone (or Fluarix & Flulaval for VFC) >6mos 10/15/2019, 2022    Hepatitis A 2018    Pneumococcal Polysaccharide (PPSV23) 2020    Tdap 2020         Part of this note may be an electronic transcription/translation of spoken language  to printed   text using the Dragon Dictation System.      LOLITA Dailey

## 2024-04-10 ENCOUNTER — PRIOR AUTHORIZATION (OUTPATIENT)
Dept: FAMILY MEDICINE CLINIC | Age: 59
End: 2024-04-10
Payer: COMMERCIAL

## 2024-04-10 NOTE — TELEPHONE ENCOUNTER
Key: [id number]    Wegovy and any other medications used for weight loss are excluded from patient's plan (Medicaid).

## 2024-04-13 DIAGNOSIS — M79.604 BILATERAL LEG PAIN: Primary | ICD-10-CM

## 2024-04-13 DIAGNOSIS — G89.29 CHRONIC BILATERAL LOW BACK PAIN, UNSPECIFIED WHETHER SCIATICA PRESENT: ICD-10-CM

## 2024-04-13 DIAGNOSIS — M79.605 BILATERAL LEG PAIN: Primary | ICD-10-CM

## 2024-04-13 DIAGNOSIS — M54.50 CHRONIC BILATERAL LOW BACK PAIN, UNSPECIFIED WHETHER SCIATICA PRESENT: ICD-10-CM

## 2024-04-13 DIAGNOSIS — M79.605 PAIN OF LEFT LOWER EXTREMITY: ICD-10-CM

## 2024-04-25 DIAGNOSIS — B35.1 NAIL FUNGUS: ICD-10-CM

## 2024-04-30 RX ORDER — CICLOPIROX 80 MG/ML
SOLUTION TOPICAL
Qty: 6.6 ML | Refills: 0 | Status: SHIPPED | OUTPATIENT
Start: 2024-04-30

## 2024-05-09 ENCOUNTER — TELEPHONE (OUTPATIENT)
Dept: FAMILY MEDICINE CLINIC | Age: 59
End: 2024-05-09
Payer: COMMERCIAL

## 2024-08-22 ENCOUNTER — TELEPHONE (OUTPATIENT)
Dept: GASTROENTEROLOGY | Facility: CLINIC | Age: 59
End: 2024-08-22
Payer: COMMERCIAL

## 2024-08-22 NOTE — TELEPHONE ENCOUNTER
I attempted to call patient to see if she would like to r/s to [facility] with [facility] since this may be closer for her. I left the office number as a call back number if she'd like to r/s.

## 2024-09-09 ENCOUNTER — OFFICE VISIT (OUTPATIENT)
Dept: ENDOCRINOLOGY | Age: 59
End: 2024-09-09
Payer: COMMERCIAL

## 2024-09-09 VITALS
HEART RATE: 82 BPM | WEIGHT: 211 LBS | SYSTOLIC BLOOD PRESSURE: 126 MMHG | HEIGHT: 63 IN | DIASTOLIC BLOOD PRESSURE: 76 MMHG | OXYGEN SATURATION: 96 % | BODY MASS INDEX: 37.39 KG/M2

## 2024-09-09 DIAGNOSIS — E03.9 HYPOTHYROIDISM, UNSPECIFIED TYPE: Primary | ICD-10-CM

## 2024-09-09 DIAGNOSIS — R53.83 OTHER FATIGUE: ICD-10-CM

## 2024-09-09 LAB
FOLATE SERPL-MCNC: 14 NG/ML (ref 4.78–24.2)
T4 FREE SERPL-MCNC: 1.42 NG/DL (ref 0.92–1.68)
TSH SERPL DL<=0.005 MIU/L-ACNC: 0.91 UIU/ML (ref 0.27–4.2)
VIT B12 SERPL-MCNC: 1137 PG/ML (ref 211–946)

## 2024-09-09 PROCEDURE — 99214 OFFICE O/P EST MOD 30 MIN: CPT | Performed by: INTERNAL MEDICINE

## 2024-09-09 PROCEDURE — 1159F MED LIST DOCD IN RCRD: CPT | Performed by: INTERNAL MEDICINE

## 2024-09-09 PROCEDURE — 1160F RVW MEDS BY RX/DR IN RCRD: CPT | Performed by: INTERNAL MEDICINE

## 2024-09-09 NOTE — PROGRESS NOTES
Referring provider: No ref. provider found     Chief complaint/Reason for consult: hypothyroidism    HPI:   - 58 year old female here for hypothyroidism  - Last seen in 3/2024  - Is currently on Synthroid 112 mcg daily  - Denies missing any does of her levothyroxine  - She takes her levothyroxine at the same time every day, on an empty stomach, and not with hot beverages  - She has fatigue    The following portions of the patient's history were reviewed and updated as appropriate: allergies, current medications, past family history, past medical history, past social history, past surgical history, and problem list.      Objective     Vitals:    09/09/24 1018   BP: 126/76   Pulse: 82   SpO2: 96%        Physical Exam  Vitals reviewed.   Constitutional:       Appearance: Normal appearance.   HENT:      Head: Normocephalic and atraumatic.   Eyes:      General: No scleral icterus.  Neurological:      Mental Status: She is alert.      Gait: Gait normal.   Psychiatric:         Mood and Affect: Mood normal.         Behavior: Behavior normal.         Thought Content: Thought content normal.         Judgment: Judgment normal.         Assessment & Plan   Hypothyroidism  - Is currently on Synthroid 112 mcg daily  - Will check TSH and free T4    2. Fatigue  - Patient requests vitamin B12 be checked

## 2024-09-10 DIAGNOSIS — E03.9 HYPOTHYROIDISM, UNSPECIFIED TYPE: ICD-10-CM

## 2024-09-10 RX ORDER — LEVOTHYROXINE SODIUM 112 MCG
112 TABLET ORAL DAILY
Qty: 30 TABLET | Refills: 5 | Status: SHIPPED | OUTPATIENT
Start: 2024-09-10

## 2024-09-12 ENCOUNTER — OFFICE VISIT (OUTPATIENT)
Dept: GASTROENTEROLOGY | Facility: CLINIC | Age: 59
End: 2024-09-12
Payer: COMMERCIAL

## 2024-09-12 VITALS
HEART RATE: 78 BPM | BODY MASS INDEX: 37.74 KG/M2 | HEIGHT: 63 IN | DIASTOLIC BLOOD PRESSURE: 65 MMHG | WEIGHT: 213 LBS | SYSTOLIC BLOOD PRESSURE: 127 MMHG | OXYGEN SATURATION: 100 %

## 2024-09-12 DIAGNOSIS — Z12.11 ENCOUNTER FOR SCREENING FOR MALIGNANT NEOPLASM OF COLON: Primary | ICD-10-CM

## 2024-09-12 RX ORDER — POLYETHYLENE GLYCOL 3350, SODIUM SULFATE, SODIUM CHLORIDE, POTASSIUM CHLORIDE, ASCORBIC ACID, SODIUM ASCORBATE 140-9-5.2G
1 KIT ORAL TAKE AS DIRECTED
Qty: 1 EACH | Refills: 0 | Status: SHIPPED | OUTPATIENT
Start: 2024-09-12 | End: 2024-09-13

## 2024-09-12 NOTE — PROGRESS NOTES
Chief Complaint  Colon Cancer Screening and Diarrhea    Chely Peoples is a 59 y.o. female who presents to [facility] on referral from LOLITA Dailey for a gastroenterology evaluation of screening colonoscopy.      History of Present Illness  New patient presents to the office for screening colonoscopy. She can struggle with some intermittent diarrhea but attributes this to diet. Denies abdominal pain, melena, and hematochezia. Denies upper GI symptoms.     Colonoscopy 2010 by Dr. Agosto - denise with moderate diverticular disease in the sigmoid.     Past Medical History:   Diagnosis Date    Anxiety     Anxiety and depression     BMI 29.0-29.9,adult     CAD (coronary artery disease)     COPD (chronic obstructive pulmonary disease)     Depression     Depression     History of anemia     History of suicide attempt     History of viral gastroenteritis     Hypothyroidism     Suicide attempt        Past Surgical History:   Procedure Laterality Date    APPENDECTOMY       SECTION      COLONOSCOPY      CORONARY STENT PLACEMENT  2018    HYSTERECTOMY      Not due to cancer    OOPHORECTOMY      TONSILLECTOMY           Current Outpatient Medications:     aspirin 81 MG EC tablet, Take 1 tablet by mouth Daily. Whenever she remembers to take it, Disp: , Rfl:     atorvastatin (LIPITOR) 40 MG tablet, Take 1 tablet by mouth every night at bedtime., Disp: 90 tablet, Rfl: 3    ciclopirox (PENLAC) 8 % solution, APPLY TOPICALLY TO THE AFFECTED AREA EVERY NIGHT AS DIRECTED, Disp: 6.6 mL, Rfl: 0    lamoTRIgine (LaMICtal) 100 MG tablet, Take 1 tablet by mouth Daily., Disp: , Rfl:     OLANZapine (zyPREXA) 2.5 MG tablet, Take 2 tablets by mouth Daily., Disp: , Rfl:     propranolol (INDERAL) 10 MG tablet, Take 1 tablet by mouth 2 (Two) Times a Day As Needed. for anxiety, Disp: , Rfl:     Synthroid 112 MCG tablet, Take 1 tablet by mouth Daily., Disp: 30 tablet, Rfl: 5    vilazodone  "(VIIBRYD) 40 MG tablet tablet, Take 1 tablet by mouth Daily., Disp: , Rfl:     PEG-KCl-NaCl-NaSulf-Na Asc-C (Plenvu) 140 g reconstituted solution solution, Take 140 g by mouth Take As Directed for 1 day. Attn: Pharmacist: please see notes for coupon code., Disp: 1 each, Rfl: 0    Semaglutide-Weight Management (Wegovy) 0.25 MG/0.5ML solution auto-injector, Inject 0.5 mL under the skin into the appropriate area as directed 1 (One) Time Per Week. (Patient not taking: Reported on 9/9/2024), Disp: 2 mL, Rfl: 0     Allergies   Allergen Reactions    Diazepam Other (See Comments) and Unknown (See Comments)     Used for procedure and made patient \"crazy\" while teenager    Tetracycline Hives    Tetracyclines & Related Rash       Family History   Problem Relation Age of Onset    COPD Mother     Heart disease Father     Suicide Attempts Nephew     Heart disease Other         FH in males before the age of 55    Colon cancer Cousin 54        Social History     Social History Narrative    Not on file       Immunization:  Immunization History   Administered Date(s) Administered    COVID-19 (PFIZER) Purple Cap Monovalent 04/04/2021, 04/25/2021, 01/12/2022    Flublok 18+yrs 12/28/2022    Fluzone (or Fluarix & Flulaval for VFC) >6mos 10/15/2019, 01/12/2022    Hepatitis A 05/01/2018    Pneumococcal Polysaccharide (PPSV23) 08/06/2020    Tdap 08/06/2020        Objective     Vital Signs:   /65 (BP Location: Left arm, Patient Position: Sitting, Cuff Size: Adult)   Pulse 78   Ht 160 cm (62.99\")   Wt 96.6 kg (213 lb)   SpO2 100%   BMI 37.74 kg/m²       Physical Exam  Constitutional:       Appearance: Normal appearance. She is normal weight.   HENT:      Head: Normocephalic and atraumatic.      Nose: Nose normal.   Pulmonary:      Effort: Pulmonary effort is normal.   Skin:     General: Skin is warm and dry.   Neurological:      Mental Status: She is alert and oriented to person, place, and time. Mental status is at baseline. "   Psychiatric:         Mood and Affect: Mood normal.         Behavior: Behavior normal.         Thought Content: Thought content normal.         Judgment: Judgment normal.         Result Review :     CBC w/diff          1/5/2024    20:51   CBC w/Diff   WBC 5.78    RBC 5.04    Hemoglobin 15.9    Hematocrit 45.9    MCV 91.1    MCH 31.5    MCHC 34.6    RDW 12.9    Platelets 257    Neutrophil Rel % 76.1    Immature Granulocyte Rel % 0.2    Lymphocyte Rel % 17.0    Monocyte Rel % 6.7    Eosinophil Rel % 0.0    Basophil Rel % 0.0      CMP          1/5/2024    20:51 2/19/2024    09:51   CMP   Glucose 118  97    BUN 16  14    Creatinine 0.87  0.98    EGFR 78.3  67.9    Sodium 139  142    Potassium 4.3  4.4    Chloride 100  108    Calcium 10.3  9.9    Total Protein 8.1  6.7    Albumin 4.8  4.8    Globulin 3.3  1.9    Total Bilirubin 0.9  0.6    Alkaline Phosphatase 150  128    AST (SGOT) 15  23    ALT (SGPT) 11  25    Albumin/Globulin Ratio 1.5  2.5    BUN/Creatinine Ratio 18.4  14.3    Anion Gap 17.8  7.0                Assessment and Plan    Diagnoses and all orders for this visit:    1. Encounter for screening for malignant neoplasm of colon (Primary)  -     Case Request; Standing  -     Follow Anesthesia Guidelines / Protocol; Standing  -     Verify NPO; Standing  -     Obtain Informed Consent; Standing  -     Case Request    Other orders  -     PEG-KCl-NaCl-NaSulf-Na Asc-C (Plenvu) 140 g reconstituted solution solution; Take 140 g by mouth Take As Directed for 1 day. Attn: Pharmacist: please see notes for coupon code.  Dispense: 1 each; Refill: 0    Cardiac clearance from Dr. Owens  COLONOSCOPY Surgical Risk and Benefits: Possible risk/complications, benefits, and alternatives to surgical or invasive procedure have been explained to patient and/or legal guardian. Risks include bleeding, infection, and perforation. Patient has been evaluated and can tolerate anesthesia and/or sedation. Risk, benefits, and alternatives  to anesthesia and sedation have been explained to patient and/or legal guardian.     Follow Up   Return if symptoms worsen or fail to improve.  Patient was given instructions and counseling regarding her condition or for health maintenance advice. Please see specific information pulled into the AVS if appropriate.

## 2024-09-16 ENCOUNTER — TELEPHONE (OUTPATIENT)
Dept: GASTROENTEROLOGY | Facility: CLINIC | Age: 59
End: 2024-09-16
Payer: COMMERCIAL

## 2024-10-09 ENCOUNTER — TELEPHONE (OUTPATIENT)
Dept: FAMILY MEDICINE CLINIC | Age: 59
End: 2024-10-09
Payer: COMMERCIAL

## 2024-10-09 NOTE — TELEPHONE ENCOUNTER
"    Caller: Chely Peoples \"Darshana\"    Relationship: Self    Best call back number: [phone]    What medication are you requesting: CONTRAVE    What are your current symptoms: N/A    How long have you been experiencing symptoms: N/A    Have you had these symptoms before:    [] Yes  [] No    Have you been treated for these symptoms before:   [] Yes  [] No    If a prescription is needed, what is your preferred pharmacy and phone number:  [facility] [address] [address]    Additional notes:PATIENT WAS ABLE TO FIND A PHARMACY THAT WORKS WITH CONTRAVE. SHE SPOKE TO [facility] ABOUT TAKING WEGOVY OR CONTRAVE. PATIENT FOUND THIS ON CONTRAVE WEBSITE. THIS PHARMACY WILL BE ABLE TO GET THE CONTRAVE FOR HER FOR 99 DOLLARS. THIS SHE WOULD BE ABLE TO AFFORD. I WAS UNABLE TO PULL THIS PHARMACY UP WITH THIS INFORMATION IN EPIC. PLEASE SEND NEW PRESCRIPTION TO PHARMACY ASAP. IF PATIENT NEEDS APPOINTMENT BEFORE THIS SCRIPT CAN BE SENT, PLEASE CALL PATIENT BACK TO SCHEDULE AND ADVISE.        "

## 2024-11-04 NOTE — PRE-PROCEDURE INSTRUCTIONS
Left message:   Reminded of arrival time at   1130      , Entrance C of the [facility] hospital. Instructed to bring or have a  over the age of 18 set up to drive you home the day of procedure.    Instructed on clear liquid diet the day before, nothing red or purple. Call with any questions about the prep or if in need of the prep.  Reminded them not to eat or drink anything am of procedure unless its a sip of water with medications.  Hold wegovy 7 days prior to procedure. Hold aspirin am of procedure.

## 2024-11-05 DIAGNOSIS — E78.2 MIXED HYPERLIPIDEMIA: ICD-10-CM

## 2024-11-05 RX ORDER — ATORVASTATIN CALCIUM 40 MG/1
40 TABLET, FILM COATED ORAL
Qty: 90 TABLET | Refills: 3 | Status: SHIPPED | OUTPATIENT
Start: 2024-11-05

## 2024-11-05 NOTE — PRE-PROCEDURE INSTRUCTIONS
Attempted PAT call.  No answer.  Left message requesting return call.  Arrival time given:  1130.  Cardiac clearance noted in chart.

## 2024-11-08 ENCOUNTER — ANESTHESIA EVENT (OUTPATIENT)
Dept: GASTROENTEROLOGY | Facility: HOSPITAL | Age: 59
End: 2024-11-08
Payer: COMMERCIAL

## 2024-11-08 NOTE — ANESTHESIA PREPROCEDURE EVALUATION
Anesthesia Evaluation     Patient summary reviewed and Nursing notes reviewed   NPO Solid Status: > 8 hours  NPO Liquid Status: > 2 hours           Airway   Mallampati: II  TM distance: <3 FB  Neck ROM: full  No difficulty expected  Dental - normal exam     Pulmonary - normal exam   (+) COPD mild,  Cardiovascular - normal exam    ECG reviewed    (+) CAD, cardiac stents (single vessel in 2018) more than 12 months ago , hyperlipidemia      Neuro/Psych  (+) psychiatric history Anxiety, Depression and ADHD  GI/Hepatic/Renal/Endo    (+) obesity, thyroid problem hypothyroidism    Musculoskeletal     Abdominal    Substance History      OB/GYN          Other        ROS/Med Hx Other: Last dose Wegovy- pt states she never started the medicine per phone conversation     Cards clearance on 09/26/24 per LOLITA Pringle -  Patient may proceed with upcoming colonoscopy and/or EGD at moderate risk.    ECHO 01/24/24:   Left ventricular systolic function is normal. Left ventricular ejection fraction appears to be 61 - 65%.  Left ventricular diastolic function is consistent with (grade I) impaired relaxation.  There are no significant valvular abnormalities.    EKG 01/06/24; HR 93,   Sinus rhythm  Abnormal R-wave progression, early transition  Nonspecific T abnormalities, anterior leads                Anesthesia Plan    ASA 2     general   total IV anesthesia  (Total IV Anesthesia    Patient understands anesthesia not responsible for dental damage.  )  intravenous induction     Anesthetic plan, risks, benefits, and alternatives have been provided, discussed and informed consent has been obtained with: patient.  Pre-procedure education provided  Use of blood products discussed with patient  Consented to blood products.    Plan discussed with CRNA.      CODE STATUS:

## 2024-11-11 ENCOUNTER — HOSPITAL ENCOUNTER (OUTPATIENT)
Facility: HOSPITAL | Age: 59
Setting detail: HOSPITAL OUTPATIENT SURGERY
Discharge: HOME | End: 2024-11-11
Attending: INTERNAL MEDICINE | Admitting: INTERNAL MEDICINE
Payer: COMMERCIAL

## 2024-11-11 ENCOUNTER — ANESTHESIA (OUTPATIENT)
Dept: GASTROENTEROLOGY | Facility: HOSPITAL | Age: 59
End: 2024-11-11
Payer: COMMERCIAL

## 2024-11-11 VITALS
BODY MASS INDEX: 37.81 KG/M2 | HEART RATE: 73 BPM | TEMPERATURE: 98.5 F | SYSTOLIC BLOOD PRESSURE: 140 MMHG | DIASTOLIC BLOOD PRESSURE: 93 MMHG | RESPIRATION RATE: 20 BRPM | WEIGHT: 213.41 LBS | OXYGEN SATURATION: 96 %

## 2024-11-11 DIAGNOSIS — Z12.11 ENCOUNTER FOR SCREENING FOR MALIGNANT NEOPLASM OF COLON: ICD-10-CM

## 2024-11-11 PROCEDURE — 88305 TISSUE EXAM BY PATHOLOGIST: CPT | Performed by: INTERNAL MEDICINE

## 2024-11-11 PROCEDURE — 25010000002 PROPOFOL 10 MG/ML EMULSION: Performed by: NURSE ANESTHETIST, CERTIFIED REGISTERED

## 2024-11-11 PROCEDURE — 25810000003 LACTATED RINGERS PER 1000 ML

## 2024-11-11 PROCEDURE — 45390 COLONOSCOPY W/RESECTION: CPT | Performed by: INTERNAL MEDICINE

## 2024-11-11 PROCEDURE — 25010000002 LIDOCAINE PF 2% 2 % SOLUTION: Performed by: NURSE ANESTHETIST, CERTIFIED REGISTERED

## 2024-11-11 PROCEDURE — 45385 COLONOSCOPY W/LESION REMOVAL: CPT | Performed by: INTERNAL MEDICINE

## 2024-11-11 DEVICE — DEV CLIP ENDO RESOLUTION360 CONTRL ROT 235CM BX/20: Type: IMPLANTABLE DEVICE | Site: DESCENDING COLON | Status: FUNCTIONAL

## 2024-11-11 RX ORDER — SODIUM CHLORIDE, SODIUM LACTATE, POTASSIUM CHLORIDE, CALCIUM CHLORIDE 600; 310; 30; 20 MG/100ML; MG/100ML; MG/100ML; MG/100ML
30 INJECTION, SOLUTION INTRAVENOUS CONTINUOUS
Status: DISCONTINUED | OUTPATIENT
Start: 2024-11-11 | End: 2024-11-11 | Stop reason: HOSPADM

## 2024-11-11 RX ORDER — LIDOCAINE HYDROCHLORIDE 20 MG/ML
INJECTION, SOLUTION EPIDURAL; INFILTRATION; INTRACAUDAL; PERINEURAL AS NEEDED
Status: DISCONTINUED | OUTPATIENT
Start: 2024-11-11 | End: 2024-11-11 | Stop reason: SURG

## 2024-11-11 RX ORDER — PROPOFOL 10 MG/ML
VIAL (ML) INTRAVENOUS AS NEEDED
Status: DISCONTINUED | OUTPATIENT
Start: 2024-11-11 | End: 2024-11-11 | Stop reason: SURG

## 2024-11-11 RX ORDER — EPHEDRINE SULFATE 50 MG/ML
INJECTION INTRAVENOUS AS NEEDED
Status: DISCONTINUED | OUTPATIENT
Start: 2024-11-11 | End: 2024-11-11 | Stop reason: SURG

## 2024-11-11 RX ORDER — PHENYLEPHRINE HCL IN 0.9% NACL 1 MG/10 ML
SYRINGE (ML) INTRAVENOUS AS NEEDED
Status: DISCONTINUED | OUTPATIENT
Start: 2024-11-11 | End: 2024-11-11 | Stop reason: SURG

## 2024-11-11 RX ADMIN — Medication 100 MCG: at 14:17

## 2024-11-11 RX ADMIN — PROPOFOL 200 MCG/KG/MIN: 10 INJECTION, EMULSION INTRAVENOUS at 13:56

## 2024-11-11 RX ADMIN — LIDOCAINE HYDROCHLORIDE 50 MG: 20 INJECTION, SOLUTION INTRAVENOUS at 13:56

## 2024-11-11 RX ADMIN — EPHEDRINE SULFATE 5 MG: 50 INJECTION INTRAVENOUS at 14:08

## 2024-11-11 RX ADMIN — Medication 200 MCG: at 14:10

## 2024-11-11 RX ADMIN — SODIUM CHLORIDE, POTASSIUM CHLORIDE, SODIUM LACTATE AND CALCIUM CHLORIDE: 600; 310; 30; 20 INJECTION, SOLUTION INTRAVENOUS at 14:09

## 2024-11-11 RX ADMIN — EPHEDRINE SULFATE 5 MG: 50 INJECTION INTRAVENOUS at 14:05

## 2024-11-11 RX ADMIN — EPHEDRINE SULFATE 10 MG: 50 INJECTION INTRAVENOUS at 14:17

## 2024-11-11 RX ADMIN — PROPOFOL 60 MG: 10 INJECTION, EMULSION INTRAVENOUS at 13:56

## 2024-11-11 RX ADMIN — EPHEDRINE SULFATE 10 MG: 50 INJECTION INTRAVENOUS at 14:10

## 2024-11-11 NOTE — ANESTHESIA POSTPROCEDURE EVALUATION
Patient: Chely Peoples    Procedure Summary       Date: 11/11/24 Room / Location: [facility] END[facility] 2 / [facility]    Anesthesia Start: 1352 Anesthesia Stop: 1446    Procedure: COLONOSCOPY Diagnosis:       Encounter for screening for malignant neoplasm of colon      (Encounter for screening for malignant neoplasm of colon [Z12.11])    Surgeons: Dustin Pascual MD Provider: Mia Charlton CRNA    Anesthesia Type: general ASA Status: 2            Anesthesia Type: general    Vitals  Vitals Value Taken Time   /83 11/11/24 1450   Temp 36.9 °C (98.5 °F) 11/11/24 1445   Pulse 76 11/11/24 1454   Resp 16 11/11/24 1450   SpO2 100 % 11/11/24 1454   Vitals shown include unfiled device data.        Post Anesthesia Care and Evaluation    Patient location during evaluation: bedside  Patient participation: complete - patient participated  Level of consciousness: awake  Pain management: adequate    Airway patency: patent  Anesthetic complications: No anesthetic complications  PONV Status: controlled  Cardiovascular status: acceptable and stable  Respiratory status: acceptable

## 2024-11-11 NOTE — H&P
Pre Procedure History & Physical    Chief Complaint:   Colon cancer screening    Subjective     HPI:   Screening for colon cancer    Past Medical History:   Past Medical History:   Diagnosis Date    Anxiety     Anxiety and depression     BMI 29.0-29.9,adult     CAD (coronary artery disease)     COPD (chronic obstructive pulmonary disease)     Depression     Depression     History of anemia     History of suicide attempt     History of viral gastroenteritis     Hypothyroidism     Suicide attempt        Past Surgical History:  Past Surgical History:   Procedure Laterality Date    APPENDECTOMY       SECTION      COLONOSCOPY      CORONARY STENT PLACEMENT  2018    HYSTERECTOMY      Not due to cancer    OOPHORECTOMY      TONSILLECTOMY         Family History:  Family History   Problem Relation Age of Onset    COPD Mother     Heart disease Father     Suicide Attempts Nephew     Heart disease Other         FH in males before the age of 55    Colon cancer Cousin 54       Social History:   reports that she quit smoking about 3 years ago. Her smoking use included cigarettes. She started smoking about 42 years ago. She has a 39 pack-year smoking history. She has been exposed to tobacco smoke. She has never used smokeless tobacco. She reports current alcohol use. She reports that she does not use drugs.    Medications:   Medications Prior to Admission   Medication Sig Dispense Refill Last Dose/Taking    aspirin 81 MG EC tablet Take 1 tablet by mouth Daily. Whenever she remembers to take it   11/10/2024    atorvastatin (LIPITOR) 40 MG tablet TAKE 1 TABLET BY MOUTH EVERY NIGHT AT BEDTIME 90 tablet 3 11/10/2024    ciclopirox (PENLAC) 8 % solution APPLY TOPICALLY TO THE AFFECTED AREA EVERY NIGHT AS DIRECTED 6.6 mL 0 11/10/2024    lamoTRIgine (LaMICtal) 100 MG tablet Take 1 tablet by mouth Daily.   11/10/2024    OLANZapine (zyPREXA) 2.5 MG tablet Take 2 tablets by mouth Daily.   11/10/2024    propranolol (INDERAL) 10 MG tablet  Take 1 tablet by mouth 2 (Two) Times a Day As Needed. for anxiety   11/10/2024    Synthroid 112 MCG tablet Take 1 tablet by mouth Daily. 30 tablet 5 11/10/2024    vilazodone (VIIBRYD) 40 MG tablet tablet Take 1 tablet by mouth Daily.   11/10/2024    Semaglutide-Weight Management (Wegovy) 0.25 MG/0.5ML solution auto-injector Inject 0.5 mL under the skin into the appropriate area as directed 1 (One) Time Per Week. (Patient not taking: Reported on 9/9/2024) 2 mL 0        Allergies:  Diazepam, Tetracycline, and Tetracyclines & related        Objective     Blood pressure 149/86, pulse 63, temperature 97 °F (36.1 °C), temperature source Temporal, resp. rate 19, weight 96.8 kg (213 lb 6.5 oz), SpO2 96%.    Physical Exam   Constitutional: Pt is oriented to person, place, and time and well-developed, well-nourished, and in no distress.   Mouth/Throat: Oropharynx is clear and moist.   Neck: Normal range of motion.   Cardiovascular: Normal rate, regular rhythm and normal heart sounds.    Pulmonary/Chest: Effort normal and breath sounds normal.   Abdominal: Soft. Nontender  Skin: Skin is warm and dry.   Psychiatric: Mood, memory, affect and judgment normal.     Assessment & Plan     Diagnosis:  Screening    Anticipated Surgical Procedure:  Colonoscopy    The risks, benefits, and alternatives of this procedure have been discussed with the patient or the responsible party- the patient understands and agrees to proceed.

## 2024-11-13 LAB
CYTO UR: NORMAL
LAB AP CASE REPORT: NORMAL
LAB AP CLINICAL INFORMATION: NORMAL
PATH REPORT.FINAL DX SPEC: NORMAL
PATH REPORT.GROSS SPEC: NORMAL

## 2024-12-30 ENCOUNTER — TELEPHONE (OUTPATIENT)
Dept: CARDIOLOGY | Facility: CLINIC | Age: 59
End: 2024-12-30

## 2024-12-30 NOTE — TELEPHONE ENCOUNTER
"    Caller: Chely Peoples \"Drashana\"    Relationship to patient: Self    Best call back number: [phone]         Type of visit: FOLLOW UP     Requested date: AS ADVISED      Additional notes:LAST APPOINTMENT MISSED 9/4/24           "

## 2025-01-15 ENCOUNTER — OFFICE VISIT (OUTPATIENT)
Dept: CARDIOLOGY | Facility: CLINIC | Age: 60
End: 2025-01-15
Payer: COMMERCIAL

## 2025-01-15 VITALS
BODY MASS INDEX: 37.91 KG/M2 | SYSTOLIC BLOOD PRESSURE: 108 MMHG | WEIGHT: 206 LBS | HEIGHT: 62 IN | HEART RATE: 83 BPM | DIASTOLIC BLOOD PRESSURE: 69 MMHG

## 2025-01-15 DIAGNOSIS — E78.2 MIXED HYPERLIPIDEMIA: ICD-10-CM

## 2025-01-15 DIAGNOSIS — I25.10 CORONARY ARTERY DISEASE INVOLVING NATIVE CORONARY ARTERY OF NATIVE HEART WITHOUT ANGINA PECTORIS: Primary | ICD-10-CM

## 2025-01-15 NOTE — PROGRESS NOTES
"Chief Complaint  Coronary Artery Disease, Fatigue, and Follow-up    Subjective        History of Present Illness  Chely Peoples presents to [facility] CARDIOLOG[facility]   Ms. Peoples is a 59-year-old female patient coming in today for cardiac follow-up.  Cardiac wise she is doing well, she has not had any episodes of chest pains, no major shortness of breath,  denies alpitations tolerates medications without any issues..          Past History:     (1) Coronary artery disease, index presentation being non-STEMI on 2018 followed by angioplasty and stent placement in the right coronary artery. She was also noted to have a 60% stenosis involving the mid left anterior descending artery with calcification. (2) Hyperlipidemia. (3) Hypothyroidism. (4) Negative for diabetes mellitus.    Past Medical History:   Diagnosis Date    Anxiety     Anxiety and depression     BMI 29.0-29.9,adult     CAD (coronary artery disease)     COPD (chronic obstructive pulmonary disease)     Depression     Depression     History of anemia     History of suicide attempt     History of viral gastroenteritis     Hypothyroidism     Suicide attempt        Allergies   Allergen Reactions    Diazepam Other (See Comments) and Unknown (See Comments)     Used for procedure and made patient \"crazy\" while teenager    Tetracycline Hives    Tetracyclines & Related Rash        Past Surgical History:   Procedure Laterality Date    APPENDECTOMY       SECTION      COLONOSCOPY      COLONOSCOPY N/A 2024    Procedure: COLONOSCOPY;  Surgeon: Dustin Pascual MD;  Location: [facility] ENDOSCOPY;  Service: Gastroenterology;  Laterality: N/A;  colon polyps, diverticulosisclip x2    CORONARY STENT PLACEMENT      HYSTERECTOMY      Not due to cancer    OOPHORECTOMY      TONSILLECTOMY          Social History  She  reports that she quit smoking about 3 years ago. Her smoking use included cigarettes. She started smoking about 42 years " "ago. She has a 39 pack-year smoking history. She has been exposed to tobacco smoke. She has never used smokeless tobacco. She reports current alcohol use. She reports that she does not use drugs.    Family History  Her family history includes COPD in her mother; Colon cancer (age of onset: 54) in her cousin; Heart disease in her father and another family member; Suicide Attempts in her nephew.       Current Outpatient Medications on File Prior to Visit   Medication Sig    aspirin 81 MG EC tablet Take 1 tablet by mouth Daily. Whenever she remembers to take it    atorvastatin (LIPITOR) 40 MG tablet TAKE 1 TABLET BY MOUTH EVERY NIGHT AT BEDTIME    ciclopirox (PENLAC) 8 % solution APPLY TOPICALLY TO THE AFFECTED AREA EVERY NIGHT AS DIRECTED    lamoTRIgine (LaMICtal) 100 MG tablet Take 1 tablet by mouth Daily.    OLANZapine (zyPREXA) 2.5 MG tablet Take 2 tablets by mouth Daily.    propranolol (INDERAL) 10 MG tablet Take 1 tablet by mouth 2 (Two) Times a Day As Needed. for anxiety    Semaglutide-Weight Management (Wegovy) 0.25 MG/0.5ML solution auto-injector Inject 0.5 mL under the skin into the appropriate area as directed 1 (One) Time Per Week. (Patient taking differently: Inject 1 mL under the skin into the appropriate area as directed 1 (One) Time Per Week.)    Synthroid 112 MCG tablet Take 1 tablet by mouth Daily.    vilazodone (VIIBRYD) 40 MG tablet tablet Take 1 tablet by mouth Daily.     No current facility-administered medications on file prior to visit.         Review of Systems   Constitutional:  Negative for fatigue.   Respiratory:  Negative for cough, chest tightness and shortness of breath.    Cardiovascular:  Negative for chest pain, palpitations and leg swelling.   Gastrointestinal:  Negative for nausea and vomiting.   Neurological:  Negative for dizziness and syncope.        Objective   Vitals:    01/15/25 0952   BP: 108/69   Pulse: 83   Weight: 93.4 kg (206 lb)   Height: 157.5 cm (62\")         Physical " "Exam  General : Alert, awake, no acute distress  Neck : Supple, no carotid bruit, no jugular venous distention  CVS : Regular rate and rhythm, no murmur, no rubs or gallops  Lungs: Clear to auscultation bilaterally, no crackles or rhonchi  Abdomen: Soft, nontender, bowel sounds active  Extremities: Warm, well-perfused, no pedal edema      Result Review     The following data was reviewed by LOLITA Acuna  proBNP   Date Value Ref Range Status   01/05/2024 70.8 0.0 - 900.0 pg/mL Final            Lab Results   Component Value Date    TSH 0.907 09/09/2024      Lab Results   Component Value Date    FREET4 1.42 09/09/2024      No results found for: \"DDIMERQUANT\"  Magnesium   Date Value Ref Range Status   01/05/2024 2.1 1.6 - 2.6 mg/dL Final      No results found for: \"DIGOXIN\"   Lab Results   Component Value Date    TROPONINT 8 01/05/2024                   Results for orders placed in visit on 01/24/24    Adult Transthoracic Echo Complete W/ Cont if Necessary Per Protocol    Interpretation Summary    Left ventricular systolic function is normal. Left ventricular ejection fraction appears to be 61 - 65%.    Left ventricular diastolic function is consistent with (grade I) impaired relaxation.    There are no significant valvular abnormalities.             Assessment and Plan   Diagnoses and all orders for this visit:    1. Coronary artery disease involving native coronary artery of native heart without angina pectoris (Primary)  Assessment & Plan:  She is stable without symptoms of angina.  Continue daily aspirin, and statin therapy.      2. Mixed hyperlipidemia  Assessment & Plan:   Most recent LDL is close to goal at 75, continue current dose atorvastatin 40 mg nightly.    Orders:  -     Comprehensive Metabolic Panel; Future  -     Lipid Panel; Future             Follow Up   Return in about 1 year (around 1/15/2026) for with Dr. Owens.    Patient was given instructions and counseling regarding her condition or " for health maintenance advice. Please see specific information pulled into the AVS if appropriate.     Signed,  Cheryl Monterroso, APRN  01/15/2025     Dictated Utilizing Dragon Dictation: Please note that portions of this note were completed with a voice recognition program.  Part of this note may be an electronic transcription/translation of spoken language to printed text using the Dragon Dictation System.

## 2025-02-28 ENCOUNTER — PRIOR AUTHORIZATION (OUTPATIENT)
Dept: ENDOCRINOLOGY | Age: 60
End: 2025-02-28
Payer: COMMERCIAL

## 2025-02-28 NOTE — TELEPHONE ENCOUNTER
A PA for Synthroid 112MCG tablets has been started.    (Key: [id number])  PA Case ID #: [id number]  Rx #: [id number]

## 2025-03-03 NOTE — TELEPHONE ENCOUNTER
Approved on February 28 by [place] [employer] 2017    The request has been approved. The authorization is effective from 02/28/2025 to 02/28/2026, as long as the member is enrolled in their current health plan. A written notification letter will follow with additional details.    Effective Date: 2/27/2025    Authorization Expiration Date: 2/27/2026

## 2025-04-30 ENCOUNTER — LAB (OUTPATIENT)
Dept: LAB | Facility: HOSPITAL | Age: 60
End: 2025-04-30
Payer: COMMERCIAL

## 2025-04-30 ENCOUNTER — OFFICE VISIT (OUTPATIENT)
Dept: FAMILY MEDICINE CLINIC | Age: 60
End: 2025-04-30
Payer: COMMERCIAL

## 2025-04-30 VITALS
WEIGHT: 189 LBS | TEMPERATURE: 98.4 F | BODY MASS INDEX: 34.78 KG/M2 | OXYGEN SATURATION: 95 % | HEART RATE: 73 BPM | SYSTOLIC BLOOD PRESSURE: 102 MMHG | DIASTOLIC BLOOD PRESSURE: 67 MMHG | HEIGHT: 62 IN

## 2025-04-30 DIAGNOSIS — R73.03 PREDIABETES: ICD-10-CM

## 2025-04-30 DIAGNOSIS — E53.8 VITAMIN B12 DEFICIENCY: ICD-10-CM

## 2025-04-30 DIAGNOSIS — Z87.891 HISTORY OF CIGARETTE SMOKING: ICD-10-CM

## 2025-04-30 DIAGNOSIS — L98.9 LEG SKIN LESION, LEFT: ICD-10-CM

## 2025-04-30 DIAGNOSIS — R53.83 FATIGUE, UNSPECIFIED TYPE: ICD-10-CM

## 2025-04-30 DIAGNOSIS — E03.9 HYPOTHYROIDISM, UNSPECIFIED TYPE: ICD-10-CM

## 2025-04-30 DIAGNOSIS — E55.9 VITAMIN D DEFICIENCY: ICD-10-CM

## 2025-04-30 DIAGNOSIS — R53.83 FATIGUE, UNSPECIFIED TYPE: Primary | ICD-10-CM

## 2025-04-30 DIAGNOSIS — E78.2 MIXED HYPERLIPIDEMIA: ICD-10-CM

## 2025-04-30 LAB
ALBUMIN SERPL-MCNC: 4.2 G/DL (ref 3.5–5.2)
ALBUMIN/GLOB SERPL: 1.9 G/DL
ALP SERPL-CCNC: 118 U/L (ref 39–117)
ALT SERPL W P-5'-P-CCNC: 16 U/L (ref 1–33)
ANION GAP SERPL CALCULATED.3IONS-SCNC: 12 MMOL/L (ref 5–15)
AST SERPL-CCNC: 19 U/L (ref 1–32)
BASOPHILS # BLD AUTO: 0 10*3/MM3 (ref 0–0.2)
BASOPHILS NFR BLD AUTO: 0 % (ref 0–1.5)
BILIRUB SERPL-MCNC: 0.6 MG/DL (ref 0–1.2)
BUN SERPL-MCNC: 9 MG/DL (ref 6–20)
BUN/CREAT SERPL: 13.4 (ref 7–25)
CALCIUM SPEC-SCNC: 9.2 MG/DL (ref 8.6–10.5)
CHLORIDE SERPL-SCNC: 103 MMOL/L (ref 98–107)
CHOLEST SERPL-MCNC: 105 MG/DL (ref 0–200)
CO2 SERPL-SCNC: 26 MMOL/L (ref 22–29)
CREAT SERPL-MCNC: 0.67 MG/DL (ref 0.57–1)
DEPRECATED RDW RBC AUTO: 47.4 FL (ref 37–54)
EGFRCR SERPLBLD CKD-EPI 2021: 102.1 ML/MIN/1.73
EOSINOPHIL # BLD AUTO: 0 10*3/MM3 (ref 0–0.4)
EOSINOPHIL NFR BLD AUTO: 0 % (ref 0.3–6.2)
ERYTHROCYTE [DISTWIDTH] IN BLOOD BY AUTOMATED COUNT: 13 % (ref 12.3–15.4)
FOLATE SERPL-MCNC: 3.94 NG/ML (ref 4.78–24.2)
GLOBULIN UR ELPH-MCNC: 2.2 GM/DL
GLUCOSE SERPL-MCNC: 95 MG/DL (ref 65–99)
HBA1C MFR BLD: 5.5 % (ref 4.8–5.6)
HCT VFR BLD AUTO: 40.5 % (ref 34–46.6)
HDLC SERPL-MCNC: 32 MG/DL (ref 40–60)
HGB BLD-MCNC: 13.4 G/DL (ref 12–15.9)
IMM GRANULOCYTES # BLD AUTO: 0.01 10*3/MM3 (ref 0–0.05)
IMM GRANULOCYTES NFR BLD AUTO: 0.2 % (ref 0–0.5)
IRON 24H UR-MRATE: 101 MCG/DL (ref 37–145)
LDLC SERPL CALC-MCNC: 56 MG/DL (ref 0–100)
LDLC/HDLC SERPL: 1.75 {RATIO}
LYMPHOCYTES # BLD AUTO: 1.72 10*3/MM3 (ref 0.7–3.1)
LYMPHOCYTES NFR BLD AUTO: 39.8 % (ref 19.6–45.3)
MCH RBC QN AUTO: 32.3 PG (ref 26.6–33)
MCHC RBC AUTO-ENTMCNC: 33.1 G/DL (ref 31.5–35.7)
MCV RBC AUTO: 97.6 FL (ref 79–97)
MONOCYTES # BLD AUTO: 0.3 10*3/MM3 (ref 0.1–0.9)
MONOCYTES NFR BLD AUTO: 6.9 % (ref 5–12)
NEUTROPHILS NFR BLD AUTO: 2.29 10*3/MM3 (ref 1.7–7)
NEUTROPHILS NFR BLD AUTO: 53.1 % (ref 42.7–76)
PLATELET # BLD AUTO: 200 10*3/MM3 (ref 140–450)
PMV BLD AUTO: 9.6 FL (ref 6–12)
POTASSIUM SERPL-SCNC: 3.5 MMOL/L (ref 3.5–5.2)
PROT SERPL-MCNC: 6.4 G/DL (ref 6–8.5)
RBC # BLD AUTO: 4.15 10*6/MM3 (ref 3.77–5.28)
SODIUM SERPL-SCNC: 141 MMOL/L (ref 136–145)
T4 FREE SERPL-MCNC: 1.02 NG/DL (ref 0.92–1.68)
TRIGL SERPL-MCNC: 85 MG/DL (ref 0–150)
TSH SERPL DL<=0.05 MIU/L-ACNC: 11.7 UIU/ML (ref 0.27–4.2)
VIT B12 BLD-MCNC: 399 PG/ML (ref 211–946)
VLDLC SERPL-MCNC: 17 MG/DL (ref 5–40)
WBC NRBC COR # BLD AUTO: 4.32 10*3/MM3 (ref 3.4–10.8)

## 2025-04-30 PROCEDURE — 80050 GENERAL HEALTH PANEL: CPT

## 2025-04-30 PROCEDURE — 84439 ASSAY OF FREE THYROXINE: CPT

## 2025-04-30 PROCEDURE — 99214 OFFICE O/P EST MOD 30 MIN: CPT | Performed by: NURSE PRACTITIONER

## 2025-04-30 PROCEDURE — 82607 VITAMIN B-12: CPT

## 2025-04-30 PROCEDURE — 82746 ASSAY OF FOLIC ACID SERUM: CPT

## 2025-04-30 PROCEDURE — 1126F AMNT PAIN NOTED NONE PRSNT: CPT | Performed by: NURSE PRACTITIONER

## 2025-04-30 PROCEDURE — 80061 LIPID PANEL: CPT

## 2025-04-30 PROCEDURE — 83540 ASSAY OF IRON: CPT

## 2025-04-30 PROCEDURE — 83036 HEMOGLOBIN GLYCOSYLATED A1C: CPT

## 2025-04-30 PROCEDURE — 36415 COLL VENOUS BLD VENIPUNCTURE: CPT

## 2025-04-30 RX ORDER — TRIAMCINOLONE ACETONIDE 1 MG/G
1 CREAM TOPICAL 2 TIMES DAILY PRN
Qty: 45 G | Refills: 0 | Status: SHIPPED | OUTPATIENT
Start: 2025-04-30

## 2025-04-30 RX ORDER — CARIPRAZINE 1.5 MG/1
1.5 CAPSULE, GELATIN COATED ORAL DAILY
COMMUNITY
Start: 2025-04-26

## 2025-04-30 NOTE — PROGRESS NOTES
Chief Complaint  Chely Peoples presents to [facility] for Fatigue    Subjective     Fatigue  Symptoms include fatigue.      Darshana is here for severe fatigue.  She has discussed this with her Psychiatrist to see if from medication side effects.  It was advised she get a sleep study.  She does not sleep well.  She dougherty have hypothyroidism which has been fairly well controlled   She did have a NSTEMI and is managed by cardiology     Assessment and Plan     Diagnoses and all orders for this visit:    1. Fatigue, unspecified type (Primary)  Comments:  will work up labs, plan on sleep study  Orders:  -     Vitamin B12; Future  -     Comprehensive metabolic panel; Future  -     CBC & Differential; Future  -     Iron level; Future    2. Vitamin D deficiency    3. Vitamin B12 deficiency  -     Vitamin B12; Future  -     Folate; Future    4. Hypothyroidism, unspecified type  -     TSH Rfx On Abnormal To Free T4; Future    5. Prediabetes  -     Hemoglobin A1c; Future    6. Leg skin lesion, left  -     triamcinolone (KENALOG) 0.1 % cream; Apply 1 Application topically to the appropriate area as directed 2 (Two) Times a Day As Needed for Rash.  Dispense: 45 g; Refill: 0    7. History of cigarette smoking  -      CT Chest Low Dose Cancer Screening WO; Future            Follow Up   Return for Pending lab results.  Future Appointments   Date Time Provider Department Center   1/15/2026 11:15 AM Jose Luis Owens MD [facility]       New Medications Ordered This Visit   Medications    triamcinolone (KENALOG) 0.1 % cream     Sig: Apply 1 Application topically to the appropriate area as directed 2 (Two) Times a Day As Needed for Rash.     Dispense:  45 g     Refill:  0       Medications Discontinued During This Encounter   Medication Reason    Semaglutide-Weight Management (Wegovy) 0.25 MG/0.5ML solution auto-injector Side effects    OLANZapine (zyPREXA) 2.5 MG tablet Side effects         "  Review of Systems   Constitutional:  Positive for fatigue.       Objective     Vitals:    04/30/25 1142   BP: 102/67   BP Location: Right arm   Patient Position: Sitting   Cuff Size: Large Adult   Pulse: 73   Temp: 98.4 °F (36.9 °C)   TempSrc: Oral   SpO2: 95%   Weight: 85.7 kg (189 lb)   Height: 157.5 cm (62\")         Physical Exam  Constitutional:       General: She is not in acute distress.     Appearance: Normal appearance.   HENT:      Head: Normocephalic.   Cardiovascular:      Rate and Rhythm: Normal rate and regular rhythm.   Pulmonary:      Effort: Pulmonary effort is normal.      Breath sounds: Normal breath sounds.   Musculoskeletal:         General: Normal range of motion.   Neurological:      General: No focal deficit present.      Mental Status: She is alert and oriented to person, place, and time.   Psychiatric:         Mood and Affect: Mood normal.         Behavior: Behavior normal.               Result Review          Allergies   Allergen Reactions    Diazepam Other (See Comments) and Unknown (See Comments)     Used for procedure and made patient \"crazy\" while teenager    Tetracycline Hives    Tetracyclines & Related Rash      Past Medical History:   Diagnosis Date    Anxiety     Anxiety and depression     BMI 29.0-29.9,adult     CAD (coronary artery disease)     COPD (chronic obstructive pulmonary disease)     Depression     Depression     History of anemia     History of suicide attempt     History of viral gastroenteritis     Hypothyroidism     Suicide attempt      Current Outpatient Medications   Medication Sig Dispense Refill    aspirin 81 MG EC tablet Take 1 tablet by mouth Daily. Whenever she remembers to take it      atorvastatin (LIPITOR) 40 MG tablet TAKE 1 TABLET BY MOUTH EVERY NIGHT AT BEDTIME 90 tablet 3    ciclopirox (PENLAC) 8 % solution APPLY TOPICALLY TO THE AFFECTED AREA EVERY NIGHT AS DIRECTED 6.6 mL 0    lamoTRIgine (LaMICtal) 100 MG tablet Take 1 tablet by mouth Daily.      " propranolol (INDERAL) 10 MG tablet Take 1 tablet by mouth 2 (Two) Times a Day As Needed. for anxiety      Synthroid 112 MCG tablet Take 1 tablet by mouth Daily. 30 tablet 5    vilazodone (VIIBRYD) 40 MG tablet tablet Take 0.5 tablets by mouth Daily.      Vraylar 1.5 MG capsule capsule Take 1 capsule by mouth Daily.      triamcinolone (KENALOG) 0.1 % cream Apply 1 Application topically to the appropriate area as directed 2 (Two) Times a Day As Needed for Rash. 45 g 0     No current facility-administered medications for this visit.     Past Surgical History:   Procedure Laterality Date    APPENDECTOMY       SECTION      COLONOSCOPY      COLONOSCOPY N/A 2024    Procedure: COLONOSCOPY;  Surgeon: Dustin Pascual MD;  Location: [facility] ENDOSCOPY;  Service: Gastroenterology;  Laterality: N/A;  colon polyps, diverticulosisclip x2    CORONARY STENT PLACEMENT      HYSTERECTOMY      Not due to cancer    OOPHORECTOMY      TONSILLECTOMY        Health Maintenance Due   Topic Date Due    ZOSTER VACCINE (1 of 2) Never done    LUNG CANCER SCREENING  Never done    ANNUAL PHYSICAL  2021      Immunization History   Administered Date(s) Administered    COVID-19 (PFIZER) Purple Cap Monovalent 2021, 2021, 2022    Flublok 18+yrs 2022    Fluzone (or Fluarix & Flulaval for VFC) >6mos 10/15/2019, 2022    Hepatitis A 2018    PPD Test 2022    Pneumococcal Polysaccharide (PPSV23) 2020    Tdap 2020         Part of this note may be an electronic transcription/translation of spoken language to printed   text using the Dragon Dictation System.      Nusrat Malcolm, APRN

## 2025-05-14 ENCOUNTER — TELEPHONE (OUTPATIENT)
Dept: FAMILY MEDICINE CLINIC | Age: 60
End: 2025-05-14
Payer: COMMERCIAL

## 2025-05-14 DIAGNOSIS — R53.83 FATIGUE, UNSPECIFIED TYPE: Primary | ICD-10-CM

## 2025-05-14 DIAGNOSIS — G47.00 INSOMNIA, UNSPECIFIED TYPE: ICD-10-CM

## 2025-05-14 NOTE — TELEPHONE ENCOUNTER
"  Caller: Chely Peoples \"Darshana\"    Relationship: Self    Best call back number: [phone]    What is the medical concern/diagnosis: UNABLE TO SLEEP    What specialty or service is being requested: SLEEP STUDY     What is the provider, practice or medical service name: NONE PROVIDED [facility] LOCATION IS PREFERRED     Any additional details: THIS WAS DISCUSSED AT APPOINTMENT ON 04/30/2025 AND NO REFERRAL HAS BEEN SENT PATIENT NEEDS THIS DONE AS SOON AS POSSIBLE SINCE SHE IS NOT SLEEPING       "

## 2025-06-16 ENCOUNTER — HOSPITAL ENCOUNTER (OUTPATIENT)
Dept: CT IMAGING | Facility: HOSPITAL | Age: 60
Discharge: HOME | End: 2025-06-16
Admitting: NURSE PRACTITIONER
Payer: COMMERCIAL

## 2025-06-16 DIAGNOSIS — Z87.891 HISTORY OF CIGARETTE SMOKING: ICD-10-CM

## 2025-06-16 PROCEDURE — 71271 CT THORAX LUNG CANCER SCR C-: CPT

## 2025-07-17 ENCOUNTER — OFFICE VISIT (OUTPATIENT)
Dept: SLEEP MEDICINE | Facility: HOSPITAL | Age: 60
End: 2025-07-17
Payer: COMMERCIAL

## 2025-07-17 VITALS
HEART RATE: 80 BPM | BODY MASS INDEX: 33.68 KG/M2 | OXYGEN SATURATION: 95 % | DIASTOLIC BLOOD PRESSURE: 62 MMHG | WEIGHT: 183 LBS | HEIGHT: 62 IN | SYSTOLIC BLOOD PRESSURE: 122 MMHG

## 2025-07-17 DIAGNOSIS — G47.19 EXCESSIVE DAYTIME SLEEPINESS: Primary | ICD-10-CM

## 2025-07-17 PROCEDURE — 99204 OFFICE O/P NEW MOD 45 MIN: CPT | Performed by: STUDENT IN AN ORGANIZED HEALTH CARE EDUCATION/TRAINING PROGRAM

## 2025-07-17 PROCEDURE — 1159F MED LIST DOCD IN RCRD: CPT | Performed by: STUDENT IN AN ORGANIZED HEALTH CARE EDUCATION/TRAINING PROGRAM

## 2025-07-17 PROCEDURE — 1160F RVW MEDS BY RX/DR IN RCRD: CPT | Performed by: STUDENT IN AN ORGANIZED HEALTH CARE EDUCATION/TRAINING PROGRAM

## 2025-07-17 PROCEDURE — G0463 HOSPITAL OUTPT CLINIC VISIT: HCPCS

## 2025-07-17 NOTE — PROGRESS NOTES
[facility]   [address]  [place] [address]  [phone]     Referring physician/provider: Nusrat Malcolm APRN   PCP: Nusrat Malcolm APRN    Type of service: Initial Sleep Medicine Consult.  Date of service: 7/17/2025        Sleep Clinic Initial Consult Visit      Patient or patient representative verbalized consent for the use of Ambient Listening during the visit with  Robert Williamson DO for chart documentation. 7/17/2025  14:07 EDT    HISTORY OF PRESENT ILLNESS  Chief Complaint: Having trouble staying asleep  Chely Peoples is a 60 y.o. female that was seen today, on 7/17/2025 at [facility].  History of Present Illness    She has been experiencing difficulty maintaining sleep for approximately 10 to 12 years, which has progressively worsened. She wakes up typically between 1:30 and 2:30 AM, even though her alarm is set for 4:30 AM. She goes to bed around 8:30 to 9:30 PM and wakes up once or twice during the night without any known cause. Despite taking NyQuil nightly, she still struggles with staying asleep.   On weekends, she wakes up early but can later return to sleep until 5:00 or 6:00 AM. She often takes one or two naps during the day, each lasting about 30 minutes, but these do not leave her feeling rested.   She has been dealing with daytime sleepiness for most of her life which she believes may be related to her lifelong struggle with depression and anxiety.   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 Her  has noticed that she snores, although it is not a nightly occurrence. She has woken herself up from snoring once or twice a week, but her  has not observed any instances of her stopping breathing at night. She occasionally wakes up with a headache and dry mouth.   She does not experience leg discomfort at night and has no history of  "sleepwalking. She has undergone two sleep studies in the past, one of which led to her being prescribed a CPAP machine over 10 years ago. However, she would often remove the machine in her sleep without realizing it. The other sleep study did not reveal any significant findings. She used the CPAP machine for a little over a month, but only tried one type of mask. She has never undergone a home sleep study.    Medical history  Anxiety  Depression  Hypothyroidism  She has hypothyroidism. Her thyroid medication dosage was recently adjusted due TSH level.   She had a heart attack in 2019 and is seeing cardiology. She also has high cholesterol.    SOCIAL HISTORY  The patient vapes but does not drink alcohol. She consumes caffeine occasionally, typically in the form of soda when eating out.    FAMILY HISTORY  The patient reports no family history of sleep apnea that she is aware of.        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     []   No   History of any near accidents while driving due to sleepiness in the past 5 years: []   Yes     [x]   No       Family hx(parents and siblings) (pertaining to sleep medicine)  There is no known family history of sleep apnea    ROS:    Negative for:  Symptoms consistent with the clinical diagnosis of Restless legs syndrome  Symptoms consistent with the clinical diagnosis of Cataplexy   Sleep walking   See scanned media document for other sleep related questions      Allergies: Diazepam, Tetracycline, and Tetracyclines & related       Objective   Vital Signs:   Vitals:    07/17/25 1300   BP: 122/62   Pulse: 80   SpO2: 95%   Weight: 83 kg (183 lb)   Height: 157.5 cm (62\")   PainSc: 0-No pain     Body mass index is 33.47 " kg/m².      PHYSICAL EXAM  CONSTITUTIONAL:  Non-toxic, In no overt distress   ENT: Mallampati class 4  NECK:Neck Circumference: 14 inches  RESPIRATORY SYSTEM: Breathing appears nonlabored, no wheezes or rales  CARDIOVASULAR SYSTEM: Regular rate and rhythm, no murmur  NEUROLOGICAL SYSTEM: answers questions appropriately      Result Review   The following data was reviewed by: Robert Williamson DO on 07/17/2025:  [x]  Medications reviewed        ASSESSMENT/PLAN  Diagnoses and all orders for this visit:    1. Excessive daytime sleepiness (Primary)  -     Home Sleep Study; Future    She reports trouble with maintaining sleep for many years.  She had diagnosis of JEANETTE in the past over 10 years ago. We do not have sleep study results. She does not have a CPAP machine.  HSAT ordered.  Discussed recommendation for starting CPAP if JEANETTE is present.   She is agreeable.     Recommended no driving or operating machinery if feeling sleepy. Discussed 20-30 minute naps may improve sleepiness.     Obesity class I, patient's BMI is Body mass index is 33.47 kg/m².. I have discussed the relationship between weight and sleep apnea.There is direct correlation between weight and severity of sleep apnea.  Weight reduction is encouraged, as it may reduce the severity of sleep apnea.   BMI is >= 30 and <35. (Class 1 Obesity). The following options were offered after discussion;: information on healthy weight added to patient's after visit summary     I have also discussed with the patient the following  Untreated JEANETTE is associated with increased risks of stroke, heart attack, heart failure, motor vehicle accidents.   Generally most people need about 7 to 9 hours of sleep per night.       FOLLOW UP  Return for 31 to 90 days after PAP setup.  Patient was given instructions and counseling regarding her condition or for health maintenance advice. Please see specific information pulled into the AVS if appropriate.         Patient's questions were  answered.  Thank you for allowing me to participate in the care of this patient.  Dictated Utilizing Dragon Dictation. Please note that portions of this note were completed with a voice recognition program. Part of this note may be an electronic transcription/translation of spoken language to printed text using the Dragon Dictation System.      [facility]   Robert Williamson DO  07/17/25  14:17 EDT

## 2025-07-21 ENCOUNTER — OFFICE VISIT (OUTPATIENT)
Dept: ENDOCRINOLOGY | Age: 60
End: 2025-07-21
Payer: COMMERCIAL

## 2025-07-21 VITALS
HEIGHT: 62 IN | HEART RATE: 92 BPM | SYSTOLIC BLOOD PRESSURE: 128 MMHG | WEIGHT: 184.8 LBS | DIASTOLIC BLOOD PRESSURE: 84 MMHG | OXYGEN SATURATION: 96 % | BODY MASS INDEX: 34.01 KG/M2

## 2025-07-21 DIAGNOSIS — E03.9 HYPOTHYROIDISM, UNSPECIFIED TYPE: Primary | ICD-10-CM

## 2025-07-21 PROCEDURE — 99213 OFFICE O/P EST LOW 20 MIN: CPT | Performed by: INTERNAL MEDICINE

## 2025-07-21 PROCEDURE — 1159F MED LIST DOCD IN RCRD: CPT | Performed by: INTERNAL MEDICINE

## 2025-07-21 PROCEDURE — 1160F RVW MEDS BY RX/DR IN RCRD: CPT | Performed by: INTERNAL MEDICINE

## 2025-07-22 LAB
T4 FREE SERPL-MCNC: 1.58 NG/DL (ref 0.92–1.68)
TSH SERPL DL<=0.005 MIU/L-ACNC: 0.93 UIU/ML (ref 0.27–4.2)

## 2025-07-30 ENCOUNTER — TELEPHONE (OUTPATIENT)
Dept: CARDIOLOGY | Facility: CLINIC | Age: 60
End: 2025-07-30
Payer: COMMERCIAL

## 2025-07-30 NOTE — TELEPHONE ENCOUNTER
Patient came into office to discuss findings from CT scan severe coronary artery atherosclerotic calcification.     Informed patient she is on the correct therapy for the findings, patient denies new or worsening CP or SOA. Patient will notify office if new symptoms occur.     Patient would like Dr Owens and LOLITA Ceja to be aware.

## 2025-07-31 ENCOUNTER — HOSPITAL ENCOUNTER (OUTPATIENT)
Dept: SLEEP MEDICINE | Facility: HOSPITAL | Age: 60
Discharge: HOME | End: 2025-07-31
Admitting: STUDENT IN AN ORGANIZED HEALTH CARE EDUCATION/TRAINING PROGRAM
Payer: COMMERCIAL

## 2025-07-31 DIAGNOSIS — G47.19 EXCESSIVE DAYTIME SLEEPINESS: ICD-10-CM

## 2025-07-31 PROCEDURE — G0399 HOME SLEEP TEST/TYPE 3 PORTA: HCPCS

## 2025-08-01 NOTE — TELEPHONE ENCOUNTER
Agree this is an expected finding for her.  We will plan on seeing her routine follow-up appointment as scheduled unless she develops new or concerning symptoms as noted.

## 2025-08-09 DIAGNOSIS — E78.2 MIXED HYPERLIPIDEMIA: ICD-10-CM

## 2025-08-11 RX ORDER — ATORVASTATIN CALCIUM 40 MG/1
40 TABLET, FILM COATED ORAL
Qty: 90 TABLET | Refills: 3 | Status: SHIPPED | OUTPATIENT
Start: 2025-08-11

## 2025-08-18 DIAGNOSIS — G47.34 NOCTURNAL HYPOXEMIA: ICD-10-CM

## 2025-08-18 DIAGNOSIS — G47.19 EXCESSIVE DAYTIME SLEEPINESS: Primary | ICD-10-CM

## 2025-08-19 DIAGNOSIS — E03.9 HYPOTHYROIDISM, UNSPECIFIED TYPE: ICD-10-CM

## 2025-08-19 RX ORDER — LEVOTHYROXINE SODIUM 125 UG/1
125 TABLET ORAL
Qty: 30 TABLET | Refills: 1 | Status: SHIPPED | OUTPATIENT
Start: 2025-08-19

## (undated) DEVICE — PAD GRND REM POLYHESIVE A/ DISP

## (undated) DEVICE — Device: Brand: DEFENDO AIR/WATER/SUCTION AND BIOPSY VALVE

## (undated) DEVICE — INJ SUB/MUCUS ELEVIEW FOR/GI/ENDO/PROC AMPL/10ML

## (undated) DEVICE — THE SINGLE USE ETRAP – POLYP TRAP IS USED FOR SUCTION RETRIEVAL OF ENDOSCOPICALLY REMOVED POLYPS.: Brand: ETRAP

## (undated) DEVICE — SNAR E/S POLYP SNAREMASTER OVL/10MM 2.8X2300MM YEL

## (undated) DEVICE — Device: Brand: SINGLE USE INJECTOR NM600/610

## (undated) DEVICE — Device

## (undated) DEVICE — SOLIDIFIER LIQLOC PLS 1500CC BT

## (undated) DEVICE — SOL IRRG H2O PL/BG 1000ML STRL